# Patient Record
Sex: FEMALE | Race: WHITE | NOT HISPANIC OR LATINO | URBAN - METROPOLITAN AREA
[De-identification: names, ages, dates, MRNs, and addresses within clinical notes are randomized per-mention and may not be internally consistent; named-entity substitution may affect disease eponyms.]

---

## 2019-11-07 ENCOUNTER — INPATIENT (INPATIENT)
Facility: HOSPITAL | Age: 79
LOS: 4 days | Discharge: NURSING, REHAB OR LONG-TERM CARE | DRG: 247 | End: 2019-11-12
Attending: FAMILY MEDICINE | Admitting: FAMILY MEDICINE
Payer: MEDICARE

## 2019-11-07 VITALS
RESPIRATION RATE: 17 BRPM | OXYGEN SATURATION: 95 % | HEART RATE: 93 BPM | DIASTOLIC BLOOD PRESSURE: 94 MMHG | WEIGHT: 160.06 LBS | TEMPERATURE: 98 F | HEIGHT: 60 IN | SYSTOLIC BLOOD PRESSURE: 116 MMHG

## 2019-11-07 DIAGNOSIS — R11.2 NAUSEA WITH VOMITING, UNSPECIFIED: ICD-10-CM

## 2019-11-07 DIAGNOSIS — N19 UNSPECIFIED KIDNEY FAILURE: ICD-10-CM

## 2019-11-07 DIAGNOSIS — R31.9 HEMATURIA, UNSPECIFIED: ICD-10-CM

## 2019-11-07 DIAGNOSIS — R03.0 ELEVATED BLOOD-PRESSURE READING, WITHOUT DIAGNOSIS OF HYPERTENSION: ICD-10-CM

## 2019-11-07 DIAGNOSIS — R53.1 WEAKNESS: ICD-10-CM

## 2019-11-07 DIAGNOSIS — Z29.9 ENCOUNTER FOR PROPHYLACTIC MEASURES, UNSPECIFIED: ICD-10-CM

## 2019-11-07 DIAGNOSIS — M62.82 RHABDOMYOLYSIS: ICD-10-CM

## 2019-11-07 DIAGNOSIS — R01.1 CARDIAC MURMUR, UNSPECIFIED: ICD-10-CM

## 2019-11-07 DIAGNOSIS — N39.0 URINARY TRACT INFECTION, SITE NOT SPECIFIED: ICD-10-CM

## 2019-11-07 LAB
ALBUMIN SERPL ELPH-MCNC: 3.9 G/DL — SIGNIFICANT CHANGE UP (ref 3.3–5)
ALP SERPL-CCNC: 92 U/L — SIGNIFICANT CHANGE UP (ref 40–120)
ALT FLD-CCNC: 27 U/L — SIGNIFICANT CHANGE UP (ref 12–78)
ANION GAP SERPL CALC-SCNC: 11 MMOL/L — SIGNIFICANT CHANGE UP (ref 5–17)
APPEARANCE UR: ABNORMAL
APTT BLD: 27.1 SEC — LOW (ref 27.5–36.3)
AST SERPL-CCNC: 63 U/L — HIGH (ref 15–37)
BASOPHILS # BLD AUTO: 0.04 K/UL — SIGNIFICANT CHANGE UP (ref 0–0.2)
BASOPHILS NFR BLD AUTO: 0.3 % — SIGNIFICANT CHANGE UP (ref 0–2)
BILIRUB SERPL-MCNC: 0.7 MG/DL — SIGNIFICANT CHANGE UP (ref 0.2–1.2)
BILIRUB UR-MCNC: NEGATIVE — SIGNIFICANT CHANGE UP
BUN SERPL-MCNC: 32 MG/DL — HIGH (ref 7–23)
CALCIUM SERPL-MCNC: 9.7 MG/DL — SIGNIFICANT CHANGE UP (ref 8.5–10.1)
CHLORIDE SERPL-SCNC: 108 MMOL/L — SIGNIFICANT CHANGE UP (ref 96–108)
CK SERPL-CCNC: 1458 U/L — HIGH (ref 26–192)
CO2 SERPL-SCNC: 21 MMOL/L — LOW (ref 22–31)
COLOR SPEC: YELLOW — SIGNIFICANT CHANGE UP
CREAT SERPL-MCNC: 1.44 MG/DL — HIGH (ref 0.5–1.3)
DIFF PNL FLD: ABNORMAL
EOSINOPHIL # BLD AUTO: 0.01 K/UL — SIGNIFICANT CHANGE UP (ref 0–0.5)
EOSINOPHIL NFR BLD AUTO: 0.1 % — SIGNIFICANT CHANGE UP (ref 0–6)
GLUCOSE SERPL-MCNC: 101 MG/DL — HIGH (ref 70–99)
GLUCOSE UR QL: NEGATIVE MG/DL — SIGNIFICANT CHANGE UP
HCT VFR BLD CALC: 41.2 % — SIGNIFICANT CHANGE UP (ref 34.5–45)
HGB BLD-MCNC: 13.5 G/DL — SIGNIFICANT CHANGE UP (ref 11.5–15.5)
IMM GRANULOCYTES NFR BLD AUTO: 0.3 % — SIGNIFICANT CHANGE UP (ref 0–1.5)
INR BLD: 1.1 RATIO — SIGNIFICANT CHANGE UP (ref 0.88–1.16)
KETONES UR-MCNC: ABNORMAL
LEUKOCYTE ESTERASE UR-ACNC: NEGATIVE — SIGNIFICANT CHANGE UP
LYMPHOCYTES # BLD AUTO: 1.54 K/UL — SIGNIFICANT CHANGE UP (ref 1–3.3)
LYMPHOCYTES # BLD AUTO: 10 % — LOW (ref 13–44)
MCHC RBC-ENTMCNC: 29.7 PG — SIGNIFICANT CHANGE UP (ref 27–34)
MCHC RBC-ENTMCNC: 32.8 GM/DL — SIGNIFICANT CHANGE UP (ref 32–36)
MCV RBC AUTO: 90.7 FL — SIGNIFICANT CHANGE UP (ref 80–100)
MONOCYTES # BLD AUTO: 0.78 K/UL — SIGNIFICANT CHANGE UP (ref 0–0.9)
MONOCYTES NFR BLD AUTO: 5.1 % — SIGNIFICANT CHANGE UP (ref 2–14)
NEUTROPHILS # BLD AUTO: 12.93 K/UL — HIGH (ref 1.8–7.4)
NEUTROPHILS NFR BLD AUTO: 84.2 % — HIGH (ref 43–77)
NITRITE UR-MCNC: NEGATIVE — SIGNIFICANT CHANGE UP
PH UR: 5 — SIGNIFICANT CHANGE UP (ref 5–8)
PLATELET # BLD AUTO: 278 K/UL — SIGNIFICANT CHANGE UP (ref 150–400)
POTASSIUM SERPL-MCNC: 3.9 MMOL/L — SIGNIFICANT CHANGE UP (ref 3.5–5.3)
POTASSIUM SERPL-SCNC: 3.9 MMOL/L — SIGNIFICANT CHANGE UP (ref 3.5–5.3)
PROT SERPL-MCNC: 8.8 GM/DL — HIGH (ref 6–8.3)
PROT UR-MCNC: 100 MG/DL
PROTHROM AB SERPL-ACNC: 12.3 SEC — SIGNIFICANT CHANGE UP (ref 10–12.9)
RBC # BLD: 4.54 M/UL — SIGNIFICANT CHANGE UP (ref 3.8–5.2)
RBC # FLD: 13.1 % — SIGNIFICANT CHANGE UP (ref 10.3–14.5)
SODIUM SERPL-SCNC: 140 MMOL/L — SIGNIFICANT CHANGE UP (ref 135–145)
SP GR SPEC: 1.02 — SIGNIFICANT CHANGE UP (ref 1.01–1.02)
TROPONIN I SERPL-MCNC: 0.04 NG/ML — SIGNIFICANT CHANGE UP (ref 0.01–0.04)
UROBILINOGEN FLD QL: NEGATIVE MG/DL — SIGNIFICANT CHANGE UP
WBC # BLD: 15.35 K/UL — HIGH (ref 3.8–10.5)
WBC # FLD AUTO: 15.35 K/UL — HIGH (ref 3.8–10.5)

## 2019-11-07 PROCEDURE — 87040 BLOOD CULTURE FOR BACTERIA: CPT

## 2019-11-07 PROCEDURE — 85025 COMPLETE CBC W/AUTO DIFF WBC: CPT

## 2019-11-07 PROCEDURE — 82550 ASSAY OF CK (CPK): CPT

## 2019-11-07 PROCEDURE — 97163 PT EVAL HIGH COMPLEX 45 MIN: CPT | Mod: GP

## 2019-11-07 PROCEDURE — C1725: CPT

## 2019-11-07 PROCEDURE — C9113: CPT

## 2019-11-07 PROCEDURE — 83036 HEMOGLOBIN GLYCOSYLATED A1C: CPT

## 2019-11-07 PROCEDURE — 84484 ASSAY OF TROPONIN QUANT: CPT

## 2019-11-07 PROCEDURE — 83735 ASSAY OF MAGNESIUM: CPT

## 2019-11-07 PROCEDURE — 97530 THERAPEUTIC ACTIVITIES: CPT | Mod: GP

## 2019-11-07 PROCEDURE — 86850 RBC ANTIBODY SCREEN: CPT

## 2019-11-07 PROCEDURE — 71045 X-RAY EXAM CHEST 1 VIEW: CPT | Mod: 26

## 2019-11-07 PROCEDURE — C1874: CPT

## 2019-11-07 PROCEDURE — 74176 CT ABD & PELVIS W/O CONTRAST: CPT

## 2019-11-07 PROCEDURE — 36415 COLL VENOUS BLD VENIPUNCTURE: CPT

## 2019-11-07 PROCEDURE — 80053 COMPREHEN METABOLIC PANEL: CPT

## 2019-11-07 PROCEDURE — 86901 BLOOD TYPING SEROLOGIC RH(D): CPT

## 2019-11-07 PROCEDURE — G0008: CPT

## 2019-11-07 PROCEDURE — 93005 ELECTROCARDIOGRAM TRACING: CPT

## 2019-11-07 PROCEDURE — 90686 IIV4 VACC NO PRSV 0.5 ML IM: CPT

## 2019-11-07 PROCEDURE — 85027 COMPLETE CBC AUTOMATED: CPT

## 2019-11-07 PROCEDURE — 80048 BASIC METABOLIC PNL TOTAL CA: CPT

## 2019-11-07 PROCEDURE — C1894: CPT

## 2019-11-07 PROCEDURE — 86900 BLOOD TYPING SEROLOGIC ABO: CPT

## 2019-11-07 PROCEDURE — 97116 GAIT TRAINING THERAPY: CPT | Mod: GP

## 2019-11-07 PROCEDURE — C1887: CPT

## 2019-11-07 PROCEDURE — C1760: CPT

## 2019-11-07 PROCEDURE — C1769: CPT

## 2019-11-07 PROCEDURE — 93010 ELECTROCARDIOGRAM REPORT: CPT

## 2019-11-07 PROCEDURE — 80061 LIPID PANEL: CPT

## 2019-11-07 PROCEDURE — 93306 TTE W/DOPPLER COMPLETE: CPT

## 2019-11-07 RX ORDER — HYDRALAZINE HCL 50 MG
10 TABLET ORAL EVERY 4 HOURS
Refills: 0 | Status: DISCONTINUED | OUTPATIENT
Start: 2019-11-07 | End: 2019-11-12

## 2019-11-07 RX ORDER — ONDANSETRON 8 MG/1
4 TABLET, FILM COATED ORAL EVERY 6 HOURS
Refills: 0 | Status: DISCONTINUED | OUTPATIENT
Start: 2019-11-07 | End: 2019-11-12

## 2019-11-07 RX ORDER — SODIUM CHLORIDE 9 MG/ML
1000 INJECTION INTRAMUSCULAR; INTRAVENOUS; SUBCUTANEOUS
Refills: 0 | Status: DISCONTINUED | OUTPATIENT
Start: 2019-11-07 | End: 2019-11-10

## 2019-11-07 RX ORDER — CEFTRIAXONE 500 MG/1
1000 INJECTION, POWDER, FOR SOLUTION INTRAMUSCULAR; INTRAVENOUS ONCE
Refills: 0 | Status: COMPLETED | OUTPATIENT
Start: 2019-11-07 | End: 2019-11-07

## 2019-11-07 RX ORDER — SODIUM CHLORIDE 9 MG/ML
1000 INJECTION INTRAMUSCULAR; INTRAVENOUS; SUBCUTANEOUS ONCE
Refills: 0 | Status: COMPLETED | OUTPATIENT
Start: 2019-11-07 | End: 2019-11-07

## 2019-11-07 RX ORDER — CEFTRIAXONE 500 MG/1
1000 INJECTION, POWDER, FOR SOLUTION INTRAMUSCULAR; INTRAVENOUS ONCE
Refills: 0 | Status: DISCONTINUED | OUTPATIENT
Start: 2019-11-07 | End: 2019-11-07

## 2019-11-07 RX ORDER — PANTOPRAZOLE SODIUM 20 MG/1
40 TABLET, DELAYED RELEASE ORAL
Refills: 0 | Status: DISCONTINUED | OUTPATIENT
Start: 2019-11-07 | End: 2019-11-08

## 2019-11-07 RX ORDER — ACETAMINOPHEN 500 MG
650 TABLET ORAL EVERY 4 HOURS
Refills: 0 | Status: DISCONTINUED | OUTPATIENT
Start: 2019-11-07 | End: 2019-11-12

## 2019-11-07 RX ADMIN — SODIUM CHLORIDE 1000 MILLILITER(S): 9 INJECTION INTRAMUSCULAR; INTRAVENOUS; SUBCUTANEOUS at 16:11

## 2019-11-07 RX ADMIN — CEFTRIAXONE 1000 MILLIGRAM(S): 500 INJECTION, POWDER, FOR SOLUTION INTRAMUSCULAR; INTRAVENOUS at 20:00

## 2019-11-07 RX ADMIN — Medication 10 MILLIGRAM(S): at 22:37

## 2019-11-07 NOTE — H&P ADULT - PROBLEM SELECTOR PLAN 4
unknown baseline  will give IVF and check in AM  if worsens/no improvement- consider further work up

## 2019-11-07 NOTE — ED PROVIDER NOTE - CLINICAL SUMMARY MEDICAL DECISION MAKING FREE TEXT BOX
Pt with generalized weakness with fall on ground where she remained for 2 days, will check CK r/o rabdo, UA, CXR, basics, reevaluate.

## 2019-11-07 NOTE — ED ADULT NURSE NOTE - NSIMPLEMENTINTERV_GEN_ALL_ED
Implemented All Universal Safety Interventions:  [place] to call system. Call bell, personal items and telephone within reach. Instruct patient to call for assistance. Room bathroom lighting operational. Non-slip footwear when patient is off stretcher. Physically safe environment: no spills, clutter or unnecessary equipment. Stretcher in lowest position, wheels locked, appropriate side rails in place.

## 2019-11-07 NOTE — H&P ADULT - ASSESSMENT
79F w/no significant PMH (does not got to MD regularly), presents after found on the floor for 2 days as she was too weak to get up.  Pt denies Fall.   +decreased PO intake, +Nausea, + dry heaves    In ED, WBC 15, Cr1.44 (no baseline), CPK 1400, cxr neg, UA neg (+blood), received rocephin, IVF.

## 2019-11-07 NOTE — ED ADULT NURSE NOTE - OBJECTIVE STATEMENT
PT BIBA sp fall x 2 days ago.  PT states neighbor helped pt to the ground, pt denies head injury -LOC.  Pt states after, "I had to sit back down on the floor because I felt weak and my lower back hurts".  Pt states "I have not eaten or drank in 2 days".  Pt A/oX3, VSS.  Daughter at bedside requesting social work as pt lives home alone.  Safety maitnained

## 2019-11-07 NOTE — H&P ADULT - HISTORY OF PRESENT ILLNESS
HPI:  79F w/no significant PMH (pt does not got to PCP very often and not on any meds), presents after found on the floor for 2 days as she was too weak to get up.  Pt is AAOX3 (PPS) and provides her own history, dtr, Catina, at bedside and verifies.  Pt lives alone, endorses poor appetite and nausea w/ dry heaves over past week contributing to decreased PO intake.  She's become more weak over the past few days and 2 days ago, she felt too weak to go any further, so she sat down on the floor and couldn't get up d/t weakness.  She denies any fall.  Family called her, but she couldn't get to the phone.  Dtr called neighbor to check on her and pt states she couldn't get to the door.  Finally, pt BIBEMS to ED for eval.  Pt c/o lower back pain, states from being on the floor.  She denies any regular medical follow up.  She takes 400mg Alleve in the morning for "aches and pains".  She denies any recent illness, fever/chills, cough/cp/sob, abd pain, hematochezia/melena/hematemesis, dysuria, hematuria.  She endorses a few episodes of diarrhea, but no further events d/t no PO intake.  Currently, pt is pleasant and comfortable and has no c/o.    In ED, WBC 15, Cr1.44 (no baseline), CPK 1400, cxr neg, UA neg (+blood), received rocephin, IVF.      PAST MEDICAL & SURGICAL HISTORY:  Urinary incontinence      Review of Systems:   CONSTITUTIONAL: No fever.  EYES: No eye pain or discharge.  ENMT:  No sinus or throat pain  NECK: No pain or stiffness  RESPIRATORY: No cough, wheezing, chills or hemoptysis; No shortness of breath  CARDIOVASCULAR: No chest pain, palpitations, dizziness, or leg swelling  GASTROINTESTINAL: No abdominal or epigastric pain.  ++nausea, ++ dry heaves,   NO hematemesis; ++diarrhea,  No melena or hematochezia.  GENITOURINARY: No dysuria  ++incontinence  NEUROLOGICAL: No headaches, memory loss, loss of strength, numbness, or tremors  SKIN: No rashes.  MUSCULOSKELETAL: No joint pain or swelling; No muscle, back, or extremity pain  PSYCHIATRIC: No depression, anxiety, mood swings, or difficulty sleeping  HEME/LYMPH: No easy bruising, or bleeding gums    Allergies    No Known Allergies    Intolerances        Social History:   LIVES ALONE  USES CANE OCC   DENIES SMOKING/ETOH/DRUG     FAMILY HISTORY:  UNKNOWN TO THIS ELDERLY LADY     Home Medications:  NONE     MEDICATIONS  (STANDING):  pantoprazole  Injectable 40 milliGRAM(s) IV Push two times a day  sodium chloride 0.9%. 1000 milliLiter(s) (125 mL/Hr) IV Continuous <Continuous>    MEDICATIONS  (PRN):  acetaminophen    Suspension .. 650 milliGRAM(s) Oral every 4 hours PRN Mild Pain (1 - 3)  hydrALAZINE Injectable 10 milliGRAM(s) IV Push every 4 hours PRN sbp>160  ondansetron Injectable 4 milliGRAM(s) IV Push every 6 hours PRN Nausea        PHYSICAL EXAM:  Vital Signs Last 24 Hrs  T(C): 36.8 (2019 19:59), Max: 36.8 (2019 17:52)  T(F): 98.3 (2019 19:59), Max: 98.3 (2019 19:59)  HR: 100 (2019 19:59) (93 - 100)  BP: 187/82 (2019 19:59) (116/94 - 187/82)  BP(mean): --  RR: 18 (2019 19:59) (17 - 18)  SpO2: 100% (2019 19:59) (95% - 100%)  GENERAL: NAD, well-developed FOR AGE  HEAD:  Atraumatic, Normocephalic  EYES: EOMI, PERRLA, conjunctiva and sclera clear  ENT: normal hearing, no nasal discharge, throat clear, + Halitosis, normal dentition for age  NECK: Supple, No JVD, NO LAD, no thyromegaly   CHEST/LUNG: Clear to auscultation bilaterally; No wheeze, resp unlabored  HEART: Regular rate and rhythm; +ROXY III/VI  ABDOMEN: Soft, Nontender, Nondistended; Bowel sounds present, no HSM  EXTREMITIES:  2+ Peripheral Pulses, No clubbing, cyanosis, or edema  PSYCH: AAOx3, normal behavior  NEUROLOGY: non-focal, sensory and cn 2-12 intact  SKIN: No visible rashes or lesions    LABS:                        13.5   15.35 )-----------( 278      ( 2019 16:08 )             41.2         140  |  108  |  32<H>  ----------------------------<  101<H>  3.9   |  21<L>  |  1.44<H>    Ca    9.7      2019 16:08    TPro  8.8<H>  /  Alb  3.9  /  TBili  0.7  /  DBili  x   /  AST  63<H>  /  ALT  27  /  AlkPhos  92      PT/INR - ( 2019 16:08 )   PT: 12.3 sec;   INR: 1.10 ratio         PTT - ( 2019 16:08 )  PTT:27.1 sec  CARDIAC MARKERS ( 2019 16:08 )  x     / x     / 1458 U/L / x     / x          Urinalysis Basic - ( 2019 17:34 )    Color: Yellow / Appearance: Slightly Turbid / S.020 / pH: x  Gluc: x / Ketone: Small  / Bili: Negative / Urobili: Negative mg/dL   Blood: x / Protein: 100 mg/dL / Nitrite: Negative   Leuk Esterase: Negative / RBC: 11-25 /HPF / WBC 11-25   Sq Epi: x / Non Sq Epi: Moderate / Bacteria: Moderate        RADIOLOGY & ADDITIONAL TESTS:    Imaging Personally Reviewed:  CXR clear    EKG Personally Reviewed:  NSR

## 2019-11-07 NOTE — ED ADULT TRIAGE NOTE - CHIEF COMPLAINT QUOTE
Patient presents s/p fall. found by daughter after fall 2 days ago and was laying on ground so daughter called EMS. denies taking blood thinners. complaining of low back pain.

## 2019-11-07 NOTE — H&P ADULT - PROBLEM SELECTOR PLAN 3
likely contributing to decreased PO intake and weakness  prn antiemetics  IV PPI BID  GI cs  hold alleve/NSAIDs  CLD for now if tolerated - ADAT   IVF

## 2019-11-07 NOTE — ED PROVIDER NOTE - MUSCULOSKELETAL, MLM
Spine appears normal, range of motion is not limited, no muscle or joint tenderness +erythema over sacral spine, no spinal TTP

## 2019-11-07 NOTE — ED PROVIDER NOTE - OBJECTIVE STATEMENT
80 y/o f with PMHx of urinary incontinence presenting to the ED c/o lower back pain s/p fall x2. Pt fell 2 days ago and was unable to pick herself up or reach the phone so she stayed on the ground until her daughter found her there today, daughter called EMS. Pt lives by herself. Pt was unable to eat or drink while on the ground. Pt uses a cane to ambulate outside, ambulates independently at home, states she lost her balance and sat back when she fell, unable to stand due to weakness. Denies taking blood thinners. Denies fever, chills, any other acute c/o. NKDA.

## 2019-11-07 NOTE — H&P ADULT - PROBLEM SELECTOR PLAN 7
no known h/o HTN although pt does not f/u w/ MD very much  will start PRN IV hydralazine and consider further long-term meds if needed

## 2019-11-08 LAB
ANION GAP SERPL CALC-SCNC: 8 MMOL/L — SIGNIFICANT CHANGE UP (ref 5–17)
BUN SERPL-MCNC: 35 MG/DL — HIGH (ref 7–23)
CALCIUM SERPL-MCNC: 8.6 MG/DL — SIGNIFICANT CHANGE UP (ref 8.5–10.1)
CHLORIDE SERPL-SCNC: 114 MMOL/L — HIGH (ref 96–108)
CK SERPL-CCNC: 635 U/L — HIGH (ref 26–192)
CO2 SERPL-SCNC: 20 MMOL/L — LOW (ref 22–31)
CREAT SERPL-MCNC: 1.4 MG/DL — HIGH (ref 0.5–1.3)
GLUCOSE SERPL-MCNC: 90 MG/DL — SIGNIFICANT CHANGE UP (ref 70–99)
HBA1C BLD-MCNC: 5.2 % — SIGNIFICANT CHANGE UP (ref 4–5.6)
HCT VFR BLD CALC: 35.2 % — SIGNIFICANT CHANGE UP (ref 34.5–45)
HGB BLD-MCNC: 11.1 G/DL — LOW (ref 11.5–15.5)
MAGNESIUM SERPL-MCNC: 2.4 MG/DL — SIGNIFICANT CHANGE UP (ref 1.6–2.6)
MCHC RBC-ENTMCNC: 29.4 PG — SIGNIFICANT CHANGE UP (ref 27–34)
MCHC RBC-ENTMCNC: 31.5 GM/DL — LOW (ref 32–36)
MCV RBC AUTO: 93.4 FL — SIGNIFICANT CHANGE UP (ref 80–100)
PLATELET # BLD AUTO: 241 K/UL — SIGNIFICANT CHANGE UP (ref 150–400)
POTASSIUM SERPL-MCNC: 3.8 MMOL/L — SIGNIFICANT CHANGE UP (ref 3.5–5.3)
POTASSIUM SERPL-SCNC: 3.8 MMOL/L — SIGNIFICANT CHANGE UP (ref 3.5–5.3)
RBC # BLD: 3.77 M/UL — LOW (ref 3.8–5.2)
RBC # FLD: 13.2 % — SIGNIFICANT CHANGE UP (ref 10.3–14.5)
SODIUM SERPL-SCNC: 142 MMOL/L — SIGNIFICANT CHANGE UP (ref 135–145)
TROPONIN I SERPL-MCNC: 0.04 NG/ML — SIGNIFICANT CHANGE UP (ref 0.01–0.04)
WBC # BLD: 12.58 K/UL — HIGH (ref 3.8–10.5)
WBC # FLD AUTO: 12.58 K/UL — HIGH (ref 3.8–10.5)

## 2019-11-08 PROCEDURE — 93306 TTE W/DOPPLER COMPLETE: CPT | Mod: 26

## 2019-11-08 RX ORDER — PANTOPRAZOLE SODIUM 20 MG/1
40 TABLET, DELAYED RELEASE ORAL
Refills: 0 | Status: DISCONTINUED | OUTPATIENT
Start: 2019-11-08 | End: 2019-11-12

## 2019-11-08 RX ORDER — INFLUENZA VIRUS VACCINE 15; 15; 15; 15 UG/.5ML; UG/.5ML; UG/.5ML; UG/.5ML
0.5 SUSPENSION INTRAMUSCULAR ONCE
Refills: 0 | Status: COMPLETED | OUTPATIENT
Start: 2019-11-08 | End: 2019-11-12

## 2019-11-08 RX ADMIN — SODIUM CHLORIDE 83 MILLILITER(S): 9 INJECTION INTRAMUSCULAR; INTRAVENOUS; SUBCUTANEOUS at 18:53

## 2019-11-08 RX ADMIN — SODIUM CHLORIDE 125 MILLILITER(S): 9 INJECTION INTRAMUSCULAR; INTRAVENOUS; SUBCUTANEOUS at 09:13

## 2019-11-08 RX ADMIN — SODIUM CHLORIDE 83 MILLILITER(S): 9 INJECTION INTRAMUSCULAR; INTRAVENOUS; SUBCUTANEOUS at 17:54

## 2019-11-08 RX ADMIN — PANTOPRAZOLE SODIUM 40 MILLIGRAM(S): 20 TABLET, DELAYED RELEASE ORAL at 05:39

## 2019-11-08 RX ADMIN — PANTOPRAZOLE SODIUM 40 MILLIGRAM(S): 20 TABLET, DELAYED RELEASE ORAL at 17:54

## 2019-11-08 RX ADMIN — SODIUM CHLORIDE 125 MILLILITER(S): 9 INJECTION INTRAMUSCULAR; INTRAVENOUS; SUBCUTANEOUS at 01:10

## 2019-11-08 NOTE — PROGRESS NOTE ADULT - SUBJECTIVE AND OBJECTIVE BOX
79 F w/no significant PMH (pt does not got to PCP very often and not on any meds), presents after found on the floor for 2 days as she was too weak to get up.  Pt is AAOX3 (PPS) and provides her own history, dtr, Catina, at bedside and verifies.  Pt lives alone, endorses poor appetite and nausea w/ dry heaves over past week contributing to decreased PO intake.  She's become more weak over the past few days and 2 days ago, she felt too weak to go any further, so she sat down on the floor and couldn't get up d/t weakness.  She denies any fall.  Family called her, but she couldn't get to the phone.  Dtr called neighbor to check on her and pt states she couldn't get to the door.  Finally, pt BIBEMS to ED for eval.  Pt c/o lower back pain, states from being on the floor.  She denies any regular medical follow up.  She takes 400mg Alleve in the morning for "aches and pains".  She denies any recent illness, fever/chills, cough/cp/sob, abd pain, hematochezia/melena/hematemesis, dysuria, hematuria.  She endorses a few episodes of diarrhea, but no further events d/t no PO intake.  Currently, pt is pleasant and comfortable and has no c/o.  Patient doesn't recall how she fell on the floor; she states : "i could have passed out"    11.8: no distress, tolerated diet            REVIEW OF SYSTEMS:    CONSTITUTIONAL: No weakness, No fevers or chills  ENT: No ear ache, No sorethroat  NECK: No pain, No stiffness  RESPIRATORY: No cough, No wheezing, No hemoptysis; No dyspnea  CARDIOVASCULAR: No chest pain, No palpitations  GASTROINTESTINAL: No abd pain, No nausea, No vomiting, No hematemesis, No diarrhea or constipation. No melena, No hematochezia.  GENITOURINARY: No dysuria, No  hematuria  NEUROLOGICAL: No diplopia, No paresthesia, No motor dysfunction  MUSCULOSKELETAL: No arthralgia, No myalgia  SKIN: No rashes, or lesions   PSYCH: no anxiety, no suicidal ideation    All other review of systems is negative unless indicated above    Vital Signs Last 24 Hrs  T(C): 36.7 (08 Nov 2019 15:35), Max: 36.8 (07 Nov 2019 17:52)  T(F): 98.1 (08 Nov 2019 15:35), Max: 98.3 (07 Nov 2019 19:59)  HR: 86 (08 Nov 2019 15:35) (86 - 100)  BP: 153/59 (08 Nov 2019 15:35) (121/76 - 187/82)  BP(mean): --  RR: 18 (08 Nov 2019 15:35) (17 - 18)  SpO2: 99% (08 Nov 2019 15:35) (99% - 100%)    PHYSICAL EXAM:    GENERAL: NAD, Well nourished  HEENT:  NC/AT, EOMI, PERRLA, No scleral icterus, Moist mucous membranes  NECK: Supple, No JVD  CNS:  Alert & Oriented X3, Motor Strength 5/5 B/L upper and lower extremities; DTRs 2+ intact   LUNG: Normal Breath sounds, Clear to auscultation bilaterally, No rales, No rhonchi, No wheezing  HEART: RRR; +large systolic murmurs, No rubs  ABDOMEN: +BS, ST/ND/NT  GENITOURINARY: Voiding, Bladder not distended  EXTREMITIES:  2+ Peripheral Pulses, No clubbing, No cyanosis, No tibial edema  MUSCULOSKELTAL: Joints normal ROM, No TTP, No effusion  VAGINAL: deferred  SKIN: no rashes  RECTAL: deferred, not indicated  BREAST: deferred                          11.1   12.58 )-----------( 241      ( 08 Nov 2019 08:42 )             35.2     11-08    142  |  114<H>  |  35<H>  ----------------------------<  90  3.8   |  20<L>  |  1.40<H>    Ca    8.6      08 Nov 2019 08:42  Mg     2.4     11-08    TPro  8.8<H>  /  Alb  3.9  /  TBili  0.7  /  DBili  x   /  AST  63<H>  /  ALT  27  /  AlkPhos  92  11-07    Vancomycin levels:   Cultures:     MEDICATIONS  (STANDING):  influenza   Vaccine 0.5 milliLiter(s) IntraMuscular once  pantoprazole  Injectable 40 milliGRAM(s) IV Push two times a day  sodium chloride 0.9%. 1000 milliLiter(s) (125 mL/Hr) IV Continuous <Continuous>    MEDICATIONS  (PRN):  acetaminophen    Suspension .. 650 milliGRAM(s) Oral every 4 hours PRN Mild Pain (1 - 3)  hydrALAZINE Injectable 10 milliGRAM(s) IV Push every 4 hours PRN sbp>160  ondansetron Injectable 4 milliGRAM(s) IV Push every 6 hours PRN Nausea      all labs reviewed  all imaging reviewed    a/p:    1. Syncope:  transfer to telemetry  Cardiology evaluation  Echocardiogram: evaluate for AS    2. Prerenal azotemia:  r/o dehydration  IV fluids, recheck Cr in Am    3. Rhabdomyolysis   IV fluids  f/u Cpk

## 2019-11-08 NOTE — PHYSICAL THERAPY INITIAL EVALUATION ADULT - MODALITIES TREATMENT COMMENTS
pt left seated in chair post Eval; chair alarm donned; callbell in reach; pt instructed not to get up alone; call nursing for assist; jason well; denied pain; RN Odalis made aware pt OOB

## 2019-11-09 LAB
ANION GAP SERPL CALC-SCNC: 8 MMOL/L — SIGNIFICANT CHANGE UP (ref 5–17)
BUN SERPL-MCNC: 32 MG/DL — HIGH (ref 7–23)
CALCIUM SERPL-MCNC: 8.6 MG/DL — SIGNIFICANT CHANGE UP (ref 8.5–10.1)
CHLORIDE SERPL-SCNC: 115 MMOL/L — HIGH (ref 96–108)
CO2 SERPL-SCNC: 20 MMOL/L — LOW (ref 22–31)
CREAT SERPL-MCNC: 1.27 MG/DL — SIGNIFICANT CHANGE UP (ref 0.5–1.3)
CULTURE RESULTS: SIGNIFICANT CHANGE UP
GLUCOSE SERPL-MCNC: 89 MG/DL — SIGNIFICANT CHANGE UP (ref 70–99)
HCT VFR BLD CALC: 35.1 % — SIGNIFICANT CHANGE UP (ref 34.5–45)
HGB BLD-MCNC: 11.1 G/DL — LOW (ref 11.5–15.5)
MCHC RBC-ENTMCNC: 29.8 PG — SIGNIFICANT CHANGE UP (ref 27–34)
MCHC RBC-ENTMCNC: 31.6 GM/DL — LOW (ref 32–36)
MCV RBC AUTO: 94.1 FL — SIGNIFICANT CHANGE UP (ref 80–100)
PLATELET # BLD AUTO: 215 K/UL — SIGNIFICANT CHANGE UP (ref 150–400)
POTASSIUM SERPL-MCNC: 3.4 MMOL/L — LOW (ref 3.5–5.3)
POTASSIUM SERPL-SCNC: 3.4 MMOL/L — LOW (ref 3.5–5.3)
RBC # BLD: 3.73 M/UL — LOW (ref 3.8–5.2)
RBC # FLD: 13.3 % — SIGNIFICANT CHANGE UP (ref 10.3–14.5)
SODIUM SERPL-SCNC: 143 MMOL/L — SIGNIFICANT CHANGE UP (ref 135–145)
SPECIMEN SOURCE: SIGNIFICANT CHANGE UP
WBC # BLD: 9.45 K/UL — SIGNIFICANT CHANGE UP (ref 3.8–10.5)
WBC # FLD AUTO: 9.45 K/UL — SIGNIFICANT CHANGE UP (ref 3.8–10.5)

## 2019-11-09 PROCEDURE — 74176 CT ABD & PELVIS W/O CONTRAST: CPT | Mod: 26

## 2019-11-09 RX ADMIN — PANTOPRAZOLE SODIUM 40 MILLIGRAM(S): 20 TABLET, DELAYED RELEASE ORAL at 06:08

## 2019-11-09 RX ADMIN — SODIUM CHLORIDE 83 MILLILITER(S): 9 INJECTION INTRAMUSCULAR; INTRAVENOUS; SUBCUTANEOUS at 18:41

## 2019-11-09 NOTE — DIETITIAN INITIAL EVALUATION ADULT. - ADD RECOMMEND
1) add ensure enlive BID to optimize PO intake 2) add MVI with minerals daily and consider checking vitamin D level 3) daily wt checks 4) monitor PO intake with strict record in EMR

## 2019-11-09 NOTE — DIETITIAN INITIAL EVALUATION ADULT. - PHYSICAL APPEARANCE
well nourished/other (specify)/obese NFPE did not reveal any significant muscle/fat wasting.  no edema.  anish score of 18, no PU noted.

## 2019-11-09 NOTE — DIETITIAN INITIAL EVALUATION ADULT. - NAME AND PHONE
Maria Isabel Nelson MA, RDN, CDN, [facility]  [phone] (office number)  [phone] (pager number)

## 2019-11-09 NOTE — DIETITIAN INITIAL EVALUATION ADULT. - OTHER INFO
78yo female with no significant PMH as pt doesn't go to PCP often and was not on any medications PTA.  Pt presented s/p being found on floor for 2 days because she was too weak to get up.  Pt endorsed to MD that she was having poor PO intake with nausea and dry heaves for 1 week prior to being found on floor.  Pt doesn't remember how she ended up on the floor.  Pt recently transferred to telemetry 2/2 possible syncope.  Pt with prerenal azotemia and rhabdomyolysis.  N/V has resolved; seen be GI pending further investigation. Per MD note, pt's daughter confirmed the decreased PO intake with Nausea and dry heaves to MD on admission.  Based on this, pt likely meeting <75% of estimated nutr needs x 1 week PTA. Pt observed with breakfast tray; stated she has been eating half of her meals since admission.  based on report, pt is meeting <75% of ENN since admission.  BM (+) 11/8.

## 2019-11-09 NOTE — DIETITIAN INITIAL EVALUATION ADULT. - PERTINENT MEDS FT
MEDICATIONS  (STANDING):  influenza   Vaccine 0.5 milliLiter(s) IntraMuscular once  pantoprazole    Tablet 40 milliGRAM(s) Oral before breakfast  sodium chloride 0.9%. 1000 milliLiter(s) (83 mL/Hr) IV Continuous <Continuous>    MEDICATIONS  (PRN):  acetaminophen    Suspension .. 650 milliGRAM(s) Oral every 4 hours PRN Mild Pain (1 - 3)  hydrALAZINE Injectable 10 milliGRAM(s) IV Push every 4 hours PRN sbp>160  ondansetron Injectable 4 milliGRAM(s) IV Push every 6 hours PRN Nausea

## 2019-11-09 NOTE — DIETITIAN INITIAL EVALUATION ADULT. - PERTINENT LABORATORY DATA
Normal for race
11-09 Na143 mmol/L Glu 89 mg/dL K+ 3.4 mmol/L<L> Cr  1.27 mg/dL BUN 32 mg/dL<H> Phos n/a   Alb n/a   PAB n/a

## 2019-11-09 NOTE — DIETITIAN INITIAL EVALUATION ADULT. - FACTORS AFF FOOD INTAKE
pt provides conflicting information.  told MD that she had bad N/V that caused patient not to eat which led to falling down and not being able to get up or eat x 2 days.  Pt told RD, no N/V issues recently and that shes been eating.  diet recall reveals good intake, however, when questioned more on it, pt stated she was not really eating.  Unable to quantify adequacy of PO intake 2/2 inconsistent information being provided.  pt did say her clothes are looser, however, unsure if she lost weight as she hasn't weighed her self in years./other (specify)

## 2019-11-09 NOTE — PROGRESS NOTE ADULT - SUBJECTIVE AND OBJECTIVE BOX
79 F w/no significant PMH (pt does not got to PCP very often and not on any meds), presents after found on the floor for 2 days as she was too weak to get up.  Pt is AAOX3 (PPS) and provides her own history, dtrCatina, at bedside and verifies.  Pt lives alone, endorses poor appetite and nausea w/ dry heaves over past week contributing to decreased PO intake.  She's become more weak over the past few days and 2 days ago, she felt too weak to go any further, so she sat down on the floor and couldn't get up d/t weakness.  She denies any fall.  Family called her, but she couldn't get to the phone.  Dtr called neighbor to check on her and pt states she couldn't get to the door.  Finally, pt BIBEMS to ED for eval.  Pt c/o lower back pain, states from being on the floor.  She denies any regular medical follow up.  She takes 400mg Alleve in the morning for "aches and pains".  She denies any recent illness, fever/chills, cough/cp/sob, abd pain, hematochezia/melena/hematemesis, dysuria, hematuria.  She endorses a few episodes of diarrhea, but no further events d/t no PO intake.  Currently, pt is pleasant and comfortable and has no c/o.  Patient doesn't recall how she fell on the floor; she states : "i could have passed out"    11.8: no distress, tolerated diet  11.9: no cp, no sob, no more n/v            REVIEW OF SYSTEMS:    CONSTITUTIONAL: No weakness, No fevers or chills  ENT: No ear ache, No sorethroat  NECK: No pain, No stiffness  RESPIRATORY: No cough, No wheezing, No hemoptysis; No dyspnea  CARDIOVASCULAR: No chest pain, No palpitations  GASTROINTESTINAL: No abd pain, No nausea, No vomiting, No hematemesis, No diarrhea or constipation. No melena, No hematochezia.  GENITOURINARY: No dysuria, No  hematuria  NEUROLOGICAL: No diplopia, No paresthesia, No motor dysfunction  MUSCULOSKELETAL: No arthralgia, No myalgia  SKIN: No rashes, or lesions   PSYCH: no anxiety, no suicidal ideation    All other review of systems is negative unless indicated above    Vital Signs Last 24 Hrs  T(C): 36.7 (08 Nov 2019 15:35), Max: 36.8 (07 Nov 2019 17:52)  T(F): 98.1 (08 Nov 2019 15:35), Max: 98.3 (07 Nov 2019 19:59)  HR: 86 (08 Nov 2019 15:35) (86 - 100)  BP: 153/59 (08 Nov 2019 15:35) (121/76 - 187/82)  BP(mean): --  RR: 18 (08 Nov 2019 15:35) (17 - 18)  SpO2: 99% (08 Nov 2019 15:35) (99% - 100%)    PHYSICAL EXAM:    GENERAL: NAD, Well nourished  HEENT:  NC/AT, EOMI, PERRLA, No scleral icterus, Moist mucous membranes  NECK: Supple, No JVD  CNS:  Alert & Oriented X3, Motor Strength 5/5 B/L upper and lower extremities; DTRs 2+ intact   LUNG: Normal Breath sounds, Clear to auscultation bilaterally, No rales, No rhonchi, No wheezing  HEART: RRR; +large systolic murmurs, No rubs  ABDOMEN: +BS, ST/ND/NT  GENITOURINARY: Voiding, Bladder not distended  EXTREMITIES:  2+ Peripheral Pulses, No clubbing, No cyanosis, No tibial edema  MUSCULOSKELTAL: Joints normal ROM, No TTP, No effusion  VAGINAL: deferred  SKIN: no rashes  RECTAL: deferred, not indicated  BREAST: deferred                          11.1   12.58 )-----------( 241      ( 08 Nov 2019 08:42 )             35.2     11-08    142  |  114<H>  |  35<H>  ----------------------------<  90  3.8   |  20<L>  |  1.40<H>    Ca    8.6      08 Nov 2019 08:42  Mg     2.4     11-08    TPro  8.8<H>  /  Alb  3.9  /  TBili  0.7  /  DBili  x   /  AST  63<H>  /  ALT  27  /  AlkPhos  92  11-07    Vancomycin levels:   Cultures:     MEDICATIONS  (STANDING):  influenza   Vaccine 0.5 milliLiter(s) IntraMuscular once  pantoprazole  Injectable 40 milliGRAM(s) IV Push two times a day  sodium chloride 0.9%. 1000 milliLiter(s) (125 mL/Hr) IV Continuous <Continuous>    MEDICATIONS  (PRN):  acetaminophen    Suspension .. 650 milliGRAM(s) Oral every 4 hours PRN Mild Pain (1 - 3)  hydrALAZINE Injectable 10 milliGRAM(s) IV Push every 4 hours PRN sbp>160  ondansetron Injectable 4 milliGRAM(s) IV Push every 6 hours PRN Nausea      all labs reviewed  all imaging reviewed    a/p:    1. Syncope:  possible vaso vagal event due to nausea/vomiting  GI eval noted: for CT abdomen    -Echo: severe AS  cardiology eval    -PSVT: self limited  will c/w monitoring    2. Prerenal azotemia:  resolving    3. Rhabdomyolysis

## 2019-11-10 RX ORDER — LANOLIN ALCOHOL/MO/W.PET/CERES
5 CREAM (GRAM) TOPICAL AT BEDTIME
Refills: 0 | Status: DISCONTINUED | OUTPATIENT
Start: 2019-11-10 | End: 2019-11-12

## 2019-11-10 RX ADMIN — SODIUM CHLORIDE 83 MILLILITER(S): 9 INJECTION INTRAMUSCULAR; INTRAVENOUS; SUBCUTANEOUS at 05:50

## 2019-11-10 RX ADMIN — Medication 5 MILLIGRAM(S): at 21:48

## 2019-11-10 RX ADMIN — PANTOPRAZOLE SODIUM 40 MILLIGRAM(S): 20 TABLET, DELAYED RELEASE ORAL at 05:50

## 2019-11-10 NOTE — CONSULT NOTE ADULT - ASSESSMENT
Imp:  N/V has resolved but the etiology of the presentation remains somewhat unclear    Rec:  CT abdomen and pelvis with PO contrast  Will defer IV contrast given borderline Cr clearance
79F w/no significant PMH (pt does not got to PCP very often and not on any meds), presents after found on the floor for 2 days as she was too weak to get up.  Pt is AAOX3 (PPS) and provides her own history, dtr, Catina, at bedside and verifies.  Pt lives alone, endorses poor appetite and nausea w/ dry heaves over past week contributing to decreased PO intake.  She's become more weak over the past few days and 2 days ago, she felt too weak to go any further, so she sat down on the floor and couldn't get up d/t weakness.  She denies any fall.  Family called her, but she couldn't get to the phone.  Dtr called neighbor to check on her and pt states she couldn't get to the door.  Finally, pt BIBEMS to ED for eval.  Pt c/o lower back pain, states from being on the floor.  She denies any regular medical follow up.  She takes 400mg Alleve in the morning for "aches and pains".  She denies any recent illness, fever/chills, cough/cp/sob, abd pain, hematochezia/melena/hematemesis, dysuria, hematuria.  She endorses a few episodes of diarrhea, but no further events d/t no PO intake.  Currently, pt is pleasant and comfortable and has no c/o.    In ED, WBC 15, Cr1.44 (no baseline), CPK 1400, cxr neg, UA neg (+blood), received rocephin, IVF.    11/10- found to have at least mod to severe AS by echo   syncope related to AS ? tele shows brief bursts of SVT   1) right and left cath todetermine the severiyt of AS   2) if AS not severe the she may need a LINQ

## 2019-11-10 NOTE — PROGRESS NOTE ADULT - SUBJECTIVE AND OBJECTIVE BOX
79 F w/no significant PMH (pt does not got to PCP very often and not on any meds), presents after found on the floor for 2 days as she was too weak to get up.  Pt is AAOX3 (PPS) and provides her own history, dtrCatina, at bedside and verifies.  Pt lives alone, endorses poor appetite and nausea w/ dry heaves over past week contributing to decreased PO intake.  She's become more weak over the past few days and 2 days ago, she felt too weak to go any further, so she sat down on the floor and couldn't get up d/t weakness.  She denies any fall.  Family called her, but she couldn't get to the phone.  Dtr called neighbor to check on her and pt states she couldn't get to the door.  Finally, pt BIBEMS to ED for eval.  Pt c/o lower back pain, states from being on the floor.  She denies any regular medical follow up.  She takes 400mg Alleve in the morning for "aches and pains".  She denies any recent illness, fever/chills, cough/cp/sob, abd pain, hematochezia/melena/hematemesis, dysuria, hematuria.  She endorses a few episodes of diarrhea, but no further events d/t no PO intake.  Currently, pt is pleasant and comfortable and has no c/o.  Patient doesn't recall how she fell on the floor; she states : "i could have passed out"    11.8: no distress, tolerated diet  11.10: no distress, tele: PSVT            REVIEW OF SYSTEMS:    CONSTITUTIONAL: No weakness, No fevers or chills  ENT: No ear ache, No sorethroat  NECK: No pain, No stiffness  RESPIRATORY: No cough, No wheezing, No hemoptysis; No dyspnea  CARDIOVASCULAR: No chest pain, No palpitations  GASTROINTESTINAL: No abd pain, No nausea, No vomiting, No hematemesis, No diarrhea or constipation. No melena, No hematochezia.  GENITOURINARY: No dysuria, No  hematuria  NEUROLOGICAL: No diplopia, No paresthesia, No motor dysfunction  MUSCULOSKELETAL: No arthralgia, No myalgia  SKIN: No rashes, or lesions   PSYCH: no anxiety, no suicidal ideation    All other review of systems is negative unless indicated above    Vital Signs Last 24 Hrs  T(C): 36.7 (08 Nov 2019 15:35), Max: 36.8 (07 Nov 2019 17:52)  T(F): 98.1 (08 Nov 2019 15:35), Max: 98.3 (07 Nov 2019 19:59)  HR: 86 (08 Nov 2019 15:35) (86 - 100)  BP: 153/59 (08 Nov 2019 15:35) (121/76 - 187/82)  BP(mean): --  RR: 18 (08 Nov 2019 15:35) (17 - 18)  SpO2: 99% (08 Nov 2019 15:35) (99% - 100%)    PHYSICAL EXAM:    GENERAL: NAD, Well nourished  HEENT:  NC/AT, EOMI, PERRLA, No scleral icterus, Moist mucous membranes  NECK: Supple, No JVD  CNS:  Alert & Oriented X3, Motor Strength 5/5 B/L upper and lower extremities; DTRs 2+ intact   LUNG: Normal Breath sounds, Clear to auscultation bilaterally, No rales, No rhonchi, No wheezing  HEART: RRR; +large systolic murmurs, No rubs  ABDOMEN: +BS, ST/ND/NT  GENITOURINARY: Voiding, Bladder not distended  EXTREMITIES:  2+ Peripheral Pulses, No clubbing, No cyanosis, No tibial edema  MUSCULOSKELTAL: Joints normal ROM, No TTP, No effusion  VAGINAL: deferred  SKIN: no rashes  RECTAL: deferred, not indicated  BREAST: deferred                          11.1   12.58 )-----------( 241      ( 08 Nov 2019 08:42 )             35.2     11-08    142  |  114<H>  |  35<H>  ----------------------------<  90  3.8   |  20<L>  |  1.40<H>    Ca    8.6      08 Nov 2019 08:42  Mg     2.4     11-08    TPro  8.8<H>  /  Alb  3.9  /  TBili  0.7  /  DBili  x   /  AST  63<H>  /  ALT  27  /  AlkPhos  92  11-07    Vancomycin levels:   Cultures:     MEDICATIONS  (STANDING):  influenza   Vaccine 0.5 milliLiter(s) IntraMuscular once  pantoprazole  Injectable 40 milliGRAM(s) IV Push two times a day  sodium chloride 0.9%. 1000 milliLiter(s) (125 mL/Hr) IV Continuous <Continuous>    MEDICATIONS  (PRN):  acetaminophen    Suspension .. 650 milliGRAM(s) Oral every 4 hours PRN Mild Pain (1 - 3)  hydrALAZINE Injectable 10 milliGRAM(s) IV Push every 4 hours PRN sbp>160  ondansetron Injectable 4 milliGRAM(s) IV Push every 6 hours PRN Nausea      all labs reviewed  all imaging reviewed    a/p:    1. Syncope:  Echo: severe AS  Tele: PSVT  Cardio eval noted: for cardiac cath    2. Prerenal azotemia:  resolved  d/c IVFluids    3. Rhabdomyolysis   resolved    4. Duodenitis:  c/w PPI    5. Endometrial thickening   outpt f/u with GYN

## 2019-11-10 NOTE — PROGRESS NOTE ADULT - SUBJECTIVE AND OBJECTIVE BOX
Patient is a 79y old  Female who presents with a chief complaint of Generalized weakness (08 Nov 2019 17:40)      Subective:  No complaints  Feels good.    PAST MEDICAL & SURGICAL HISTORY:  Urinary incontinence      MEDICATIONS  (STANDING):  influenza   Vaccine 0.5 milliLiter(s) IntraMuscular once  pantoprazole    Tablet 40 milliGRAM(s) Oral before breakfast  sodium chloride 0.9%. 1000 milliLiter(s) (83 mL/Hr) IV Continuous <Continuous>    MEDICATIONS  (PRN):  acetaminophen    Suspension .. 650 milliGRAM(s) Oral every 4 hours PRN Mild Pain (1 - 3)  hydrALAZINE Injectable 10 milliGRAM(s) IV Push every 4 hours PRN sbp>160  ondansetron Injectable 4 milliGRAM(s) IV Push every 6 hours PRN Nausea      REVIEW OF SYSTEMS:    RESPIRATORY: No shortness of breath  CARDIOVASCULAR: No chest pain  All other review of systems is negative unless indicated above.    Vital Signs Last 24 Hrs  T(C): 36.7 (10 Nov 2019 05:53), Max: 36.9 (09 Nov 2019 11:24)  T(F): 98.1 (10 Nov 2019 05:53), Max: 98.4 (09 Nov 2019 11:24)  HR: 75 (10 Nov 2019 05:53) (75 - 82)  BP: 150/67 (10 Nov 2019 05:53) (150/55 - 150/67)  BP(mean): --  RR: 19 (09 Nov 2019 21:00) (19 - 20)  SpO2: 98% (10 Nov 2019 05:53) (98% - 100%)    PHYSICAL EXAM:    Constitutional: NAD, well-developed  Respiratory: CTAB  Cardiovascular: S1 and S2, RRR  Gastrointestinal: BS+, soft, NT/ND  Extremities: No peripheral edema  Psychiatric: Normal mood, normal affect    LABS:                        11.1   9.45  )-----------( 215      ( 09 Nov 2019 08:42 )             35.1     11-09    143  |  115<H>  |  32<H>  ----------------------------<  89  3.4<L>   |  20<L>  |  1.27    Ca    8.6      09 Nov 2019 08:42            RADIOLOGY & ADDITIONAL STUDIES:

## 2019-11-10 NOTE — CONSULT NOTE ADULT - SUBJECTIVE AND OBJECTIVE BOX
HPI:  HPI:  79F w/no significant PMH (pt does not got to PCP very often and not on any meds), presents after found on the floor for 2 days as she was too weak to get up.  Pt is AAOX3 (PPS) and provides her own history, dtr, Catina, at bedside and verifies.  Pt lives alone, endorses poor appetite and nausea w/ dry heaves over past week contributing to decreased PO intake.  She's become more weak over the past few days and 2 days ago, she felt too weak to go any further, so she sat down on the floor and couldn't get up d/t weakness.  She denies any fall.  Family called her, but she couldn't get to the phone.  Dtr called neighbor to check on her and pt states she couldn't get to the door.  Finally, pt BIBEMS to ED for eval.  Pt c/o lower back pain, states from being on the floor.  She denies any regular medical follow up.  She takes 400mg Alleve in the morning for "aches and pains".  She denies any recent illness, fever/chills, cough/cp/sob, abd pain, hematochezia/melena/hematemesis, dysuria, hematuria.  She endorses a few episodes of diarrhea, but no further events d/t no PO intake.  Currently, pt is pleasant and comfortable and has no c/o.    In ED, WBC 15, Cr1.44 (no baseline), CPK 1400, cxr neg, UA neg (+blood), received rocephin, IVF.    ------------------  Patient now feels good. No N/V. No pain  PAST MEDICAL & SURGICAL HISTORY:  Urinary incontinence      Home Medications:      MEDICATIONS  (STANDING):  influenza   Vaccine 0.5 milliLiter(s) IntraMuscular once  pantoprazole    Tablet 40 milliGRAM(s) Oral before breakfast  sodium chloride 0.9%. 1000 milliLiter(s) (83 mL/Hr) IV Continuous <Continuous>    MEDICATIONS  (PRN):  acetaminophen    Suspension .. 650 milliGRAM(s) Oral every 4 hours PRN Mild Pain (1 - 3)  hydrALAZINE Injectable 10 milliGRAM(s) IV Push every 4 hours PRN sbp>160  ondansetron Injectable 4 milliGRAM(s) IV Push every 6 hours PRN Nausea      Allergies    No Known Allergies    Intolerances        SOCIAL HISTORY:    FAMILY HISTORY:      ROS  As above  Otherwise unremarkable    Vital Signs Last 24 Hrs  T(C): 36.7 (08 Nov 2019 15:35), Max: 36.8 (07 Nov 2019 17:52)  T(F): 98.1 (08 Nov 2019 15:35), Max: 98.3 (07 Nov 2019 19:59)  HR: 86 (08 Nov 2019 15:35) (86 - 100)  BP: 153/59 (08 Nov 2019 15:35) (121/76 - 187/82)  BP(mean): --  RR: 18 (08 Nov 2019 15:35) (17 - 18)  SpO2: 99% (08 Nov 2019 15:35) (99% - 100%)    Constitutional: NAD, well-developed  Respiratory: CTAB  Cardiovascular: S1 and S2, RRR  Gastrointestinal: BS+, soft, NT/ND  Extremities: No peripheral edema  Psychiatric: Normal mood, normal affect  Skin: No rashes    LABS:                        11.1   12.58 )-----------( 241      ( 08 Nov 2019 08:42 )             35.2     11-08    142  |  114<H>  |  35<H>  ----------------------------<  90  3.8   |  20<L>  |  1.40<H>    Ca    8.6      08 Nov 2019 08:42  Mg     2.4     11-08    TPro  8.8<H>  /  Alb  3.9  /  TBili  0.7  /  DBili  x   /  AST  63<H>  /  ALT  27  /  AlkPhos  92  11-07    PT/INR - ( 07 Nov 2019 16:08 )   PT: 12.3 sec;   INR: 1.10 ratio         PTT - ( 07 Nov 2019 16:08 )  PTT:27.1 sec  LIVER FUNCTIONS - ( 07 Nov 2019 16:08 )  Alb: 3.9 g/dL / Pro: 8.8 gm/dL / ALK PHOS: 92 U/L / ALT: 27 U/L / AST: 63 U/L / GGT: x             RADIOLOGY & ADDITIONAL STUDIES:
Patient is a 79y old  Female who presents with a chief complaint of Generalized weakness (2019 17:40)      HPI:  HPI:  79F w/no significant PMH (pt does not got to PCP very often and not on any meds), presents after found on the floor for 2 days as she was too weak to get up.  Pt is AAOX3 (PPS) and provides her own history, dtr, Catina, at bedside and verifies.  Pt lives alone, endorses poor appetite and nausea w/ dry heaves over past week contributing to decreased PO intake.  She's become more weak over the past few days and 2 days ago, she felt too weak to go any further, so she sat down on the floor and couldn't get up d/t weakness.  She denies any fall.  Family called her, but she couldn't get to the phone.  Dtr called neighbor to check on her and pt states she couldn't get to the door.  Finally, pt BIBEMS to ED for eval.  Pt c/o lower back pain, states from being on the floor.  She denies any regular medical follow up.  She takes 400mg Alleve in the morning for "aches and pains".  She denies any recent illness, fever/chills, cough/cp/sob, abd pain, hematochezia/melena/hematemesis, dysuria, hematuria.  She endorses a few episodes of diarrhea, but no further events d/t no PO intake.  Currently, pt is pleasant and comfortable and has no c/o.    In ED, WBC 15, Cr1.44 (no baseline), CPK 1400, cxr neg, UA neg (+blood), received rocephin, IVF.    11/10- found to have at least mod to severe AS by echo     PAST MEDICAL & SURGICAL HISTORY:  Urinary incontinence      Review of Systems:   CONSTITUTIONAL: No fever.  EYES: No eye pain or discharge.  ENMT:  No sinus or throat pain  NECK: No pain or stiffness  RESPIRATORY: No cough, wheezing, chills or hemoptysis; No shortness of breath  CARDIOVASCULAR: No chest pain, palpitations, dizziness, or leg swelling  GASTROINTESTINAL: No abdominal or epigastric pain.  ++nausea, ++ dry heaves,   NO hematemesis; ++diarrhea,  No melena or hematochezia.  GENITOURINARY: No dysuria  ++incontinence  NEUROLOGICAL: No headaches, memory loss, loss of strength, numbness, or tremors  SKIN: No rashes.  MUSCULOSKELETAL: No joint pain or swelling; No muscle, back, or extremity pain  PSYCHIATRIC: No depression, anxiety, mood swings, or difficulty sleeping  HEME/LYMPH: No easy bruising, or bleeding gums    Allergies    No Known Allergies    Intolerances        Social History:   LIVES ALONE  USES CANE OCC   DENIES SMOKING/ETOH/DRUG     FAMILY HISTORY:  UNKNOWN TO THIS ELDERLY LADY     Home Medications:  NONE     MEDICATIONS  (STANDING):  pantoprazole  Injectable 40 milliGRAM(s) IV Push two times a day  sodium chloride 0.9%. 1000 milliLiter(s) (125 mL/Hr) IV Continuous <Continuous>    MEDICATIONS  (PRN):  acetaminophen    Suspension .. 650 milliGRAM(s) Oral every 4 hours PRN Mild Pain (1 - 3)  hydrALAZINE Injectable 10 milliGRAM(s) IV Push every 4 hours PRN sbp>160  ondansetron Injectable 4 milliGRAM(s) IV Push every 6 hours PRN Nausea        PHYSICAL EXAM:  Vital Signs Last 24 Hrs  T(C): 36.8 (2019 19:59), Max: 36.8 (2019 17:52)  T(F): 98.3 (2019 19:59), Max: 98.3 (2019 19:59)  HR: 100 (2019 19:59) (93 - 100)  BP: 187/82 (2019 19:59) (116/94 - 187/82)  BP(mean): --  RR: 18 (2019 19:59) (17 - 18)  SpO2: 100% (2019 19:59) (95% - 100%)  GENERAL: NAD, well-developed FOR AGE  HEAD:  Atraumatic, Normocephalic  EYES: EOMI, PERRLA, conjunctiva and sclera clear  ENT: normal hearing, no nasal discharge, throat clear, + Halitosis, normal dentition for age  NECK: Supple, No JVD, NO LAD, no thyromegaly   CHEST/LUNG: Clear to auscultation bilaterally; No wheeze, resp unlabored  HEART: Regular rate and rhythm; +ROXY III/VI  ABDOMEN: Soft, Nontender, Nondistended; Bowel sounds present, no HSM  EXTREMITIES:  2+ Peripheral Pulses, No clubbing, cyanosis, or edema  PSYCH: AAOx3, normal behavior  NEUROLOGY: non-focal, sensory and cn 2-12 intact  SKIN: No visible rashes or lesions    LABS:                        13.5   15.35 )-----------( 278      ( 2019 16:08 )             41.2         140  |  108  |  32<H>  ----------------------------<  101<H>  3.9   |  21<L>  |  1.44<H>    Ca    9.7      2019 16:08    TPro  8.8<H>  /  Alb  3.9  /  TBili  0.7  /  DBili  x   /  AST  63<H>  /  ALT  27  /  AlkPhos  92      PT/INR - ( 2019 16:08 )   PT: 12.3 sec;   INR: 1.10 ratio         PTT - ( 2019 16:08 )  PTT:27.1 sec  CARDIAC MARKERS ( 2019 16:08 )  x     / x     / 1458 U/L / x     / x          Urinalysis Basic - ( 2019 17:34 )    Color: Yellow / Appearance: Slightly Turbid / S.020 / pH: x  Gluc: x / Ketone: Small  / Bili: Negative / Urobili: Negative mg/dL   Blood: x / Protein: 100 mg/dL / Nitrite: Negative   Leuk Esterase: Negative / RBC: 11-25 /HPF / WBC 11-25   Sq Epi: x / Non Sq Epi: Moderate / Bacteria: Moderate        RADIOLOGY & ADDITIONAL TESTS:    Imaging Personally Reviewed:  CXR clear    EKG Personally Reviewed:  NSR (2019 22:16)      PAST MEDICAL & SURGICAL HISTORY:  Urinary incontinence                                    MEDICATIONS  (STANDING):  influenza   Vaccine 0.5 milliLiter(s) IntraMuscular once  pantoprazole    Tablet 40 milliGRAM(s) Oral before breakfast  sodium chloride 0.9%. 1000 milliLiter(s) (83 mL/Hr) IV Continuous <Continuous>    MEDICATIONS  (PRN):  acetaminophen    Suspension .. 650 milliGRAM(s) Oral every 4 hours PRN Mild Pain (1 - 3)  hydrALAZINE Injectable 10 milliGRAM(s) IV Push every 4 hours PRN sbp>160  ondansetron Injectable 4 milliGRAM(s) IV Push every 6 hours PRN Nausea      FAMILY HISTORY:  NC     SOCIAL HISTORY:nonsmoker    CIGARETTES:        Vital Signs Last 24 Hrs  T(C): 36.7 (10 Nov 2019 05:53), Max: 36.9 (2019 11:24)  T(F): 98.1 (10 Nov 2019 05:53), Max: 98.4 (2019 11:24)  HR: 75 (10 Nov 2019 05:53) (75 - 82)  BP: 150/67 (10 Nov 2019 05:53) (150/55 - 150/67)  BP(mean): --  RR: 19 (2019 21:00) (19 - 20)  SpO2: 98% (10 Nov 2019 05:53) (98% - 100%)            INTERPRETATION OF TELEMETRY:    ECG:    I&O's Detail      LABS:                        11.1   9.45  )-----------( 215      ( 2019 08:42 )             35.1     -    143  |  115<H>  |  32<H>  ----------------------------<  89  3.4<L>   |  20<L>  |  1.27    Ca    8.6      2019 08:42              I&O's Summary    BNP  RADIOLOGY & ADDITIONAL STUDIES:
today

## 2019-11-11 PROCEDURE — 93010 ELECTROCARDIOGRAM REPORT: CPT

## 2019-11-11 RX ORDER — ASPIRIN/CALCIUM CARB/MAGNESIUM 324 MG
81 TABLET ORAL DAILY
Refills: 0 | Status: DISCONTINUED | OUTPATIENT
Start: 2019-11-12 | End: 2019-11-12

## 2019-11-11 RX ORDER — CLOPIDOGREL BISULFATE 75 MG/1
75 TABLET, FILM COATED ORAL DAILY
Refills: 0 | Status: DISCONTINUED | OUTPATIENT
Start: 2019-11-11 | End: 2019-11-12

## 2019-11-11 RX ORDER — LABETALOL HCL 100 MG
10 TABLET ORAL ONCE
Refills: 0 | Status: COMPLETED | OUTPATIENT
Start: 2019-11-11 | End: 2019-11-11

## 2019-11-11 RX ORDER — METOPROLOL TARTRATE 50 MG
50 TABLET ORAL DAILY
Refills: 0 | Status: DISCONTINUED | OUTPATIENT
Start: 2019-11-11 | End: 2019-11-12

## 2019-11-11 RX ORDER — METOPROLOL TARTRATE 50 MG
25 TABLET ORAL DAILY
Refills: 0 | Status: DISCONTINUED | OUTPATIENT
Start: 2019-11-11 | End: 2019-11-11

## 2019-11-11 RX ORDER — SODIUM CHLORIDE 9 MG/ML
1000 INJECTION INTRAMUSCULAR; INTRAVENOUS; SUBCUTANEOUS
Refills: 0 | Status: DISCONTINUED | OUTPATIENT
Start: 2019-11-11 | End: 2019-11-12

## 2019-11-11 RX ORDER — ATORVASTATIN CALCIUM 80 MG/1
40 TABLET, FILM COATED ORAL AT BEDTIME
Refills: 0 | Status: DISCONTINUED | OUTPATIENT
Start: 2019-11-11 | End: 2019-11-12

## 2019-11-11 RX ADMIN — SODIUM CHLORIDE 75 MILLILITER(S): 9 INJECTION INTRAMUSCULAR; INTRAVENOUS; SUBCUTANEOUS at 22:13

## 2019-11-11 RX ADMIN — Medication 5 MILLIGRAM(S): at 22:12

## 2019-11-11 RX ADMIN — Medication 50 MILLIGRAM(S): at 22:12

## 2019-11-11 RX ADMIN — PANTOPRAZOLE SODIUM 40 MILLIGRAM(S): 20 TABLET, DELAYED RELEASE ORAL at 05:35

## 2019-11-11 RX ADMIN — ATORVASTATIN CALCIUM 40 MILLIGRAM(S): 80 TABLET, FILM COATED ORAL at 22:12

## 2019-11-11 RX ADMIN — Medication 10 MILLIGRAM(S): at 18:02

## 2019-11-11 NOTE — PROGRESS NOTE ADULT - SUBJECTIVE AND OBJECTIVE BOX
Patient is a 79y old  Female who presents with a chief complaint of Generalized weakness (2019 17:40)      HPI:  HPI:  79F w/no significant PMH (pt does not got to PCP very often and not on any meds), presents after found on the floor for 2 days as she was too weak to get up.  Pt is AAOX3 (PPS) and provides her own history, dtr, Catina, at bedside and verifies.  Pt lives alone, endorses poor appetite and nausea w/ dry heaves over past week contributing to decreased PO intake.  She's become more weak over the past few days and 2 days ago, she felt too weak to go any further, so she sat down on the floor and couldn't get up d/t weakness.  She denies any fall.  Family called her, but she couldn't get to the phone.  Dtr called neighbor to check on her and pt states she couldn't get to the door.  Finally, pt BIBEMS to ED for eval.  Pt c/o lower back pain, states from being on the floor.  She denies any regular medical follow up.  She takes 400mg Alleve in the morning for "aches and pains".  She denies any recent illness, fever/chills, cough/cp/sob, abd pain, hematochezia/melena/hematemesis, dysuria, hematuria.  She endorses a few episodes of diarrhea, but no further events d/t no PO intake.  Currently, pt is pleasant and comfortable and has no c/o.    In ED, WBC 15, Cr1.44 (no baseline), CPK 1400, cxr neg, UA neg (+blood), received rocephin, IVF.    11/10- found to have at least mod to severe AS by echo      Examined at [facility], no complaints at present, awaiting [facility]    PAST MEDICAL & SURGICAL HISTORY:  Urinary incontinence      Review of Systems:   CONSTITUTIONAL: No fever.  EYES: No eye pain or discharge.  ENMT:  No sinus or throat pain  NECK: No pain or stiffness  RESPIRATORY: No cough, wheezing, chills or hemoptysis; No shortness of breath  CARDIOVASCULAR: No chest pain, palpitations, dizziness, or leg swelling  GASTROINTESTINAL: No abdominal or epigastric pain.  ++nausea, ++ dry heaves,   NO hematemesis; ++diarrhea,  No melena or hematochezia.  GENITOURINARY: No dysuria  ++incontinence  NEUROLOGICAL: No headaches, memory loss, loss of strength, numbness, or tremors  SKIN: No rashes.  MUSCULOSKELETAL: No joint pain or swelling; No muscle, back, or extremity pain  PSYCHIATRIC: No depression, anxiety, mood swings, or difficulty sleeping  HEME/LYMPH: No easy bruising, or bleeding gums    Allergies    No Known Allergies    Intolerances        Social History:   LIVES ALONE  USES CANE OCC   DENIES SMOKING/ETOH/DRUG     FAMILY HISTORY:  UNKNOWN TO THIS ELDERLY LADY     Home Medications:  NONE     MEDICATIONS  (STANDING):  pantoprazole  Injectable 40 milliGRAM(s) IV Push two times a day  sodium chloride 0.9%. 1000 milliLiter(s) (125 mL/Hr) IV Continuous <Continuous>    MEDICATIONS  (PRN):  acetaminophen    Suspension .. 650 milliGRAM(s) Oral every 4 hours PRN Mild Pain (1 - 3)  hydrALAZINE Injectable 10 milliGRAM(s) IV Push every 4 hours PRN sbp>160  ondansetron Injectable 4 milliGRAM(s) IV Push every 6 hours PRN Nausea        PHYSICAL EXAM:  Vital Signs Last 24 Hrs  T(C): 36.8 (2019 19:59), Max: 36.8 (2019 17:52)  T(F): 98.3 (2019 19:59), Max: 98.3 (2019 19:59)  HR: 100 (2019 19:59) (93 - 100)  BP: 187/82 (2019 19:59) (116/94 - 187/82)  BP(mean): --  RR: 18 (2019 19:59) (17 - 18)  SpO2: 100% (2019 19:59) (95% - 100%)  GENERAL: NAD, well-developed FOR AGE  HEAD:  Atraumatic, Normocephalic  EYES: EOMI, PERRLA, conjunctiva and sclera clear  ENT: normal hearing, no nasal discharge, throat clear, + Halitosis, normal dentition for age  NECK: Supple, No JVD, NO LAD, no thyromegaly   CHEST/LUNG: Clear to auscultation bilaterally; No wheeze, resp unlabored  HEART: Regular rate and rhythm; +ROXY III/VI  ABDOMEN: Soft, Nontender, Nondistended; Bowel sounds present, no HSM  EXTREMITIES:  2+ Peripheral Pulses, No clubbing, cyanosis, or edema  PSYCH: AAOx3, normal behavior  NEUROLOGY: non-focal, sensory and cn 2-12 intact  SKIN: No visible rashes or lesions    LABS:                        13.5   15.35 )-----------( 278      ( 2019 16:08 )             41.2     11    140  |  108  |  32<H>  ----------------------------<  101<H>  3.9   |  21<L>  |  1.44<H>    Ca    9.7      2019 16:08    TPro  8.8<H>  /  Alb  3.9  /  TBili  0.7  /  DBili  x   /  AST  63<H>  /  ALT  27  /  AlkPhos  92  11-07    PT/INR - ( 2019 16:08 )   PT: 12.3 sec;   INR: 1.10 ratio         PTT - ( 2019 16:08 )  PTT:27.1 sec  CARDIAC MARKERS ( 2019 16:08 )  x     / x     / 1458 U/L / x     / x          Urinalysis Basic - ( 2019 17:34 )    Color: Yellow / Appearance: Slightly Turbid / S.020 / pH: x  Gluc: x / Ketone: Small  / Bili: Negative / Urobili: Negative mg/dL   Blood: x / Protein: 100 mg/dL / Nitrite: Negative   Leuk Esterase: Negative / RBC: 11-25 /HPF / WBC 11-25   Sq Epi: x / Non Sq Epi: Moderate / Bacteria: Moderate        RADIOLOGY & ADDITIONAL TESTS:    Imaging Personally Reviewed:  CXR clear    EKG Personally Reviewed:  NSR (2019 22:16)      PAST MEDICAL & SURGICAL HISTORY:  Urinary incontinence                                    MEDICATIONS  (STANDING):  influenza   Vaccine 0.5 milliLiter(s) IntraMuscular once  pantoprazole    Tablet 40 milliGRAM(s) Oral before breakfast  sodium chloride 0.9%. 1000 milliLiter(s) (83 mL/Hr) IV Continuous <Continuous>    MEDICATIONS  (PRN):  acetaminophen    Suspension .. 650 milliGRAM(s) Oral every 4 hours PRN Mild Pain (1 - 3)  hydrALAZINE Injectable 10 milliGRAM(s) IV Push every 4 hours PRN sbp>160  ondansetron Injectable 4 milliGRAM(s) IV Push every 6 hours PRN Nausea      FAMILY HISTORY:  NC     SOCIAL HISTORY:nonsmoker    CIGARETTES:        Vital Signs Last 24 Hrs  T(C): 36.7 (10 Nov 2019 05:53), Max: 36.9 (2019 11:24)  T(F): 98.1 (10 Nov 2019 05:53), Max: 98.4 (2019 11:24)  HR: 75 (10 Nov 2019 05:53) (75 - 82)  BP: 150/67 (10 Nov 2019 05:53) (150/55 - 150/67)  BP(mean): --  RR: 19 (2019 21:00) (19 - 20)  SpO2: 98% (10 Nov 2019 05:53) (98% - 100%)            INTERPRETATION OF TELEMETRY:    ECG:    I&O's Detail      LABS:                        11.1   9.45  )-----------( 215      ( 2019 08:42 )             35.1         143  |  115<H>  |  32<H>  ----------------------------<  89  3.4<L>   |  20<L>  |  1.27    Ca    8.6      2019 08:42              I&O's Summary    BNP  RADIOLOGY & ADDITIONAL STUDIES:

## 2019-11-11 NOTE — PROGRESS NOTE ADULT - SUBJECTIVE AND OBJECTIVE BOX
79 F w/no significant PMH (pt does not got to PCP very often and not on any meds), presents after found on the floor for 2 days as she was too weak to get up.  Pt is AAOX3 (PPS) and provides her own history, dtrCatina, at bedside and verifies.  Pt lives alone, endorses poor appetite and nausea w/ dry heaves over past week contributing to decreased PO intake.  She's become more weak over the past few days and 2 days ago, she felt too weak to go any further, so she sat down on the floor and couldn't get up d/t weakness.  She denies any fall.  Family called her, but she couldn't get to the phone.  Dtr called neighbor to check on her and pt states she couldn't get to the door.  Finally, pt BIBEMS to ED for eval.  Pt c/o lower back pain, states from being on the floor.  She denies any regular medical follow up.  She takes 400mg Alleve in the morning for "aches and pains".  She denies any recent illness, fever/chills, cough/cp/sob, abd pain, hematochezia/melena/hematemesis, dysuria, hematuria.  She endorses a few episodes of diarrhea, but no further events d/t no PO intake.  Currently, pt is pleasant and comfortable and has no c/o.  Patient doesn't recall how she fell on the floor; she states : "i could have passed out"    11.8: no distress, tolerated diet  11.10: no distress, tele: PSVT  11.11: no distress, no cp, no sob            REVIEW OF SYSTEMS:    CONSTITUTIONAL: No weakness, No fevers or chills  ENT: No ear ache, No sorethroat  NECK: No pain, No stiffness  RESPIRATORY: No cough, No wheezing, No hemoptysis; No dyspnea  CARDIOVASCULAR: No chest pain, No palpitations  GASTROINTESTINAL: No abd pain, No nausea, No vomiting, No hematemesis, No diarrhea or constipation. No melena, No hematochezia.  GENITOURINARY: No dysuria, No  hematuria  NEUROLOGICAL: No diplopia, No paresthesia, No motor dysfunction  MUSCULOSKELETAL: No arthralgia, No myalgia  SKIN: No rashes, or lesions   PSYCH: no anxiety, no suicidal ideation    All other review of systems is negative unless indicated above    Vital Signs Last 24 Hrs  T(C): 36.7 (08 Nov 2019 15:35), Max: 36.8 (07 Nov 2019 17:52)  T(F): 98.1 (08 Nov 2019 15:35), Max: 98.3 (07 Nov 2019 19:59)  HR: 86 (08 Nov 2019 15:35) (86 - 100)  BP: 153/59 (08 Nov 2019 15:35) (121/76 - 187/82)  RR: 18 (08 Nov 2019 15:35) (17 - 18)  SpO2: 99% (08 Nov 2019 15:35) (99% - 100%)    PHYSICAL EXAM:    GENERAL: NAD, Well nourished  HEENT:  NC/AT, EOMI, PERRLA, No scleral icterus, Moist mucous membranes  NECK: Supple, No JVD  CNS:  Alert & Oriented X3, Motor Strength 5/5 B/L upper and lower extremities; DTRs 2+ intact   LUNG: Normal Breath sounds, Clear to auscultation bilaterally, No rales, No rhonchi, No wheezing  HEART: RRR; +large systolic murmurs, No rubs  ABDOMEN: +BS, ST/ND/NT  GENITOURINARY: Voiding, Bladder not distended  EXTREMITIES:  2+ Peripheral Pulses, No clubbing, No cyanosis, No tibial edema  MUSCULOSKELTAL: Joints normal ROM, No TTP, No effusion  VAGINAL: deferred  SKIN: no rashes  RECTAL: deferred, not indicated  BREAST: deferred                          11.1   12.58 )-----------( 241      ( 08 Nov 2019 08:42 )             35.2     11-08    142  |  114<H>  |  35<H>  ----------------------------<  90  3.8   |  20<L>  |  1.40<H>    Ca    8.6      08 Nov 2019 08:42  Mg     2.4     11-08    TPro  8.8<H>  /  Alb  3.9  /  TBili  0.7  /  DBili  x   /  AST  63<H>  /  ALT  27  /  AlkPhos  92  11-07    Vancomycin levels:   Cultures:     MEDICATIONS  (STANDING):  influenza   Vaccine 0.5 milliLiter(s) IntraMuscular once  pantoprazole  Injectable 40 milliGRAM(s) IV Push two times a day  sodium chloride 0.9%. 1000 milliLiter(s) (125 mL/Hr) IV Continuous <Continuous>    MEDICATIONS  (PRN):  acetaminophen    Suspension .. 650 milliGRAM(s) Oral every 4 hours PRN Mild Pain (1 - 3)  hydrALAZINE Injectable 10 milliGRAM(s) IV Push every 4 hours PRN sbp>160  ondansetron Injectable 4 milliGRAM(s) IV Push every 6 hours PRN Nausea      all labs reviewed  all imaging reviewed    a/p:    1. Syncope:  Echo: severe AS  Tele: PSVT  Cardio eval noted: for cardiac cath    might need linq placemet    2. Prerenal azotemia:  resolved  d/c IVFluids    3. Rhabdomyolysis   resolved    4. Duodenitis:  c/w PPI    5. Endometrial thickening   outpt f/u with GYN

## 2019-11-11 NOTE — PACU DISCHARGE NOTE - COMMENTS
patient discharged to CICU. Right groin site benign, soft nontender. dressing clean/dry/intact. IVL site benign. Patient without any complaints. Vital signs stable. Post procedure instructions given and understood by patient and family via teach back. Will continue to monitor patient status. Report given to CICU RN.

## 2019-11-11 NOTE — PROGRESS NOTE ADULT - SUBJECTIVE AND OBJECTIVE BOX
HPI:  79F w/no significant PMH (pt does not got to PCP very often and not on any meds), presented to ED after found on the floor for 2 days as she was too weak to get up. Pt reported recent poor appetite and nausea w/ dry heaves over past week contributing to decreased PO intake, c/o lower back pain.  In ED, WBC 15, Cr1.44 (no baseline), CPK 1400, cxr neg, UA neg (+blood), received rocephin, IVF.    Over the course, pt was found to have moderate-severe AS on echo.  Pt brought to cath lab for ischemic evaluation.     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   Dual antiplatelet therapy for at least one year  < end of copied text >    ROS: denies chest pain/ pressure, SOB or palpitation     Vital Signs;  T(C): 36.3 (11-11-19 @ 15:45), Max: 37.1 (11-10-19 @ 20:15)  HR: 85 (11-11-19 @ 18:25) (68 - 90)  BP: 155/81 (11-11-19 @ 18:25) (146/61 - 178/82)  RR: 16 (11-11-19 @ 18:25) (16 - 18)  SpO2: 97% (11-11-19 @ 18:25) (95% - 100%)    Physical Exam   General: awake, no acute distress   HEENT: NCAT, neck supple   CV: RRR, normal S1S2, + 3/6 systolic murmur, no rub/ gallop   Pulmonary: clear, no wheezing or rales   GI: +BS, soft, non-tender, non-distended   : voiding freely   Extremities: + 1 pitting edema in B/L LE,  + pedal pulses   Skin: no rashes or lesion. Rt. femoral access site (s/p mynx): noted to have small hematoma right post procedure, applied manual compression for 20 min, now soft and no further hematoma noted, no bleeding.     # s/p PCI with ADDISON x1 to mid RCA, moderate AS   - CICU monitor   - IV hydration  - Labs and EKG in am   - start ASA 81 mg daily and Plavix 75 mg daily (given  mg and Plavix 600 mg in cath lab)   - start Metoprolol 25 mg daily   - start Lipitor 40 mg daily   - post procedure, outcome and follow up care reviewed with patient/ MD  - follow up with cardilogist in 4-7 days HPI:  79F w/no significant PMH (pt does not got to PCP very often and not on any meds), presented to ED after found on the floor for 2 days as she was too weak to get up. Pt reported recent poor appetite and nausea w/ dry heaves over past week contributing to decreased PO intake, c/o lower back pain.  In ED, WBC 15, Cr1.44 (no baseline), CPK 1400, cxr neg, UA neg (+blood), received rocephin, IVF.    Over the course, pt was found to have moderate-severe AS on echo.  Pt brought to cath lab for ischemic evaluation.     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   Dual antiplatelet therapy for at least one year  < end of copied text >    ROS: denies chest pain/ pressure, SOB or palpitation     Vital Signs;  T(C): 36.3 (11-11-19 @ 15:45), Max: 37.1 (11-10-19 @ 20:15)  HR: 85 (11-11-19 @ 18:25) (68 - 90)  BP: 155/81 (11-11-19 @ 18:25) (146/61 - 178/82)  RR: 16 (11-11-19 @ 18:25) (16 - 18)  SpO2: 97% (11-11-19 @ 18:25) (95% - 100%)    Physical Exam   General: awake, no acute distress   HEENT: NCAT, neck supple   CV: RRR, normal S1S2, + 3/6 systolic murmur, no rub/ gallop   Pulmonary: clear, no wheezing or rales   GI: +BS, soft, non-tender, non-distended   : voiding freely   Extremities: + 1 pitting edema in B/L LE,  + pedal pulses   Skin: no rashes or lesion. Rt. femoral access site (s/p mynx): noted to have small hematoma right post procedure, applied manual compression for 20 min, now soft and no further hematoma noted, no bleeding.     # s/p PCI with ADDISON x1 to mid RCA, moderate AS   - CICU monitor   - IV hydration  - Labs and EKG in am   - start ASA 81 mg daily and Plavix 75 mg daily (given  mg and Plavix 600 mg in cath lab)   - start Toprol 50 mg daily   - start Lipitor 40 mg daily   - post procedure, outcome and follow up care reviewed with patient/ MD  - follow up with cardilogist in 4-7 days

## 2019-11-12 VITALS — HEART RATE: 73 BPM | SYSTOLIC BLOOD PRESSURE: 138 MMHG | DIASTOLIC BLOOD PRESSURE: 62 MMHG

## 2019-11-12 DIAGNOSIS — R93.89 ABNORMAL FINDINGS ON DIAGNOSTIC IMAGING OF OTHER SPECIFIED BODY STRUCTURES: ICD-10-CM

## 2019-11-12 LAB
ALBUMIN SERPL ELPH-MCNC: 2.7 G/DL — LOW (ref 3.3–5)
ALP SERPL-CCNC: 57 U/L — SIGNIFICANT CHANGE UP (ref 40–120)
ALT FLD-CCNC: 18 U/L — SIGNIFICANT CHANGE UP (ref 12–78)
ANION GAP SERPL CALC-SCNC: 7 MMOL/L — SIGNIFICANT CHANGE UP (ref 5–17)
AST SERPL-CCNC: 18 U/L — SIGNIFICANT CHANGE UP (ref 15–37)
BASOPHILS # BLD AUTO: 0.04 K/UL — SIGNIFICANT CHANGE UP (ref 0–0.2)
BASOPHILS NFR BLD AUTO: 0.5 % — SIGNIFICANT CHANGE UP (ref 0–2)
BILIRUB SERPL-MCNC: 0.5 MG/DL — SIGNIFICANT CHANGE UP (ref 0.2–1.2)
BUN SERPL-MCNC: 21 MG/DL — SIGNIFICANT CHANGE UP (ref 7–23)
CALCIUM SERPL-MCNC: 8.9 MG/DL — SIGNIFICANT CHANGE UP (ref 8.5–10.1)
CHLORIDE SERPL-SCNC: 107 MMOL/L — SIGNIFICANT CHANGE UP (ref 96–108)
CHOLEST SERPL-MCNC: 143 MG/DL — SIGNIFICANT CHANGE UP (ref 10–199)
CO2 SERPL-SCNC: 26 MMOL/L — SIGNIFICANT CHANGE UP (ref 22–31)
CREAT SERPL-MCNC: 1.29 MG/DL — SIGNIFICANT CHANGE UP (ref 0.5–1.3)
EOSINOPHIL # BLD AUTO: 0.31 K/UL — SIGNIFICANT CHANGE UP (ref 0–0.5)
EOSINOPHIL NFR BLD AUTO: 3.6 % — SIGNIFICANT CHANGE UP (ref 0–6)
GLUCOSE SERPL-MCNC: 107 MG/DL — HIGH (ref 70–99)
HCT VFR BLD CALC: 29.1 % — LOW (ref 34.5–45)
HDLC SERPL-MCNC: 45 MG/DL — LOW
HGB BLD-MCNC: 9.6 G/DL — LOW (ref 11.5–15.5)
IMM GRANULOCYTES NFR BLD AUTO: 0.5 % — SIGNIFICANT CHANGE UP (ref 0–1.5)
LIPID PNL WITH DIRECT LDL SERPL: 86 MG/DL — SIGNIFICANT CHANGE UP
LYMPHOCYTES # BLD AUTO: 2.48 K/UL — SIGNIFICANT CHANGE UP (ref 1–3.3)
LYMPHOCYTES # BLD AUTO: 28.5 % — SIGNIFICANT CHANGE UP (ref 13–44)
MCHC RBC-ENTMCNC: 30.1 PG — SIGNIFICANT CHANGE UP (ref 27–34)
MCHC RBC-ENTMCNC: 33 GM/DL — SIGNIFICANT CHANGE UP (ref 32–36)
MCV RBC AUTO: 91.2 FL — SIGNIFICANT CHANGE UP (ref 80–100)
MONOCYTES # BLD AUTO: 0.77 K/UL — SIGNIFICANT CHANGE UP (ref 0–0.9)
MONOCYTES NFR BLD AUTO: 8.9 % — SIGNIFICANT CHANGE UP (ref 2–14)
NEUTROPHILS # BLD AUTO: 5.05 K/UL — SIGNIFICANT CHANGE UP (ref 1.8–7.4)
NEUTROPHILS NFR BLD AUTO: 58 % — SIGNIFICANT CHANGE UP (ref 43–77)
PLATELET # BLD AUTO: 208 K/UL — SIGNIFICANT CHANGE UP (ref 150–400)
POTASSIUM SERPL-MCNC: 3.6 MMOL/L — SIGNIFICANT CHANGE UP (ref 3.5–5.3)
POTASSIUM SERPL-SCNC: 3.6 MMOL/L — SIGNIFICANT CHANGE UP (ref 3.5–5.3)
PROT SERPL-MCNC: 6.4 GM/DL — SIGNIFICANT CHANGE UP (ref 6–8.3)
RBC # BLD: 3.19 M/UL — LOW (ref 3.8–5.2)
RBC # FLD: 13 % — SIGNIFICANT CHANGE UP (ref 10.3–14.5)
SODIUM SERPL-SCNC: 140 MMOL/L — SIGNIFICANT CHANGE UP (ref 135–145)
TOTAL CHOLESTEROL/HDL RATIO MEASUREMENT: 3.2 RATIO — LOW (ref 3.3–7.1)
TRIGL SERPL-MCNC: 60 MG/DL — SIGNIFICANT CHANGE UP (ref 10–149)
WBC # BLD: 8.69 K/UL — SIGNIFICANT CHANGE UP (ref 3.8–10.5)
WBC # FLD AUTO: 8.69 K/UL — SIGNIFICANT CHANGE UP (ref 3.8–10.5)

## 2019-11-12 PROCEDURE — 93010 ELECTROCARDIOGRAM REPORT: CPT

## 2019-11-12 RX ORDER — CLOPIDOGREL BISULFATE 75 MG/1
1 TABLET, FILM COATED ORAL
Qty: 30 | Refills: 0
Start: 2019-11-12

## 2019-11-12 RX ORDER — ASPIRIN/CALCIUM CARB/MAGNESIUM 324 MG
1 TABLET ORAL
Qty: 0 | Refills: 0 | DISCHARGE
Start: 2019-11-12

## 2019-11-12 RX ORDER — METOPROLOL TARTRATE 50 MG
1 TABLET ORAL
Qty: 30 | Refills: 0
Start: 2019-11-12

## 2019-11-12 RX ORDER — ATORVASTATIN CALCIUM 80 MG/1
1 TABLET, FILM COATED ORAL
Qty: 30 | Refills: 0
Start: 2019-11-12

## 2019-11-12 RX ORDER — PANTOPRAZOLE SODIUM 20 MG/1
1 TABLET, DELAYED RELEASE ORAL
Qty: 30 | Refills: 0
Start: 2019-11-12

## 2019-11-12 RX ADMIN — INFLUENZA VIRUS VACCINE 0.5 MILLILITER(S): 15; 15; 15; 15 SUSPENSION INTRAMUSCULAR at 13:36

## 2019-11-12 RX ADMIN — PANTOPRAZOLE SODIUM 40 MILLIGRAM(S): 20 TABLET, DELAYED RELEASE ORAL at 06:25

## 2019-11-12 RX ADMIN — Medication 81 MILLIGRAM(S): at 13:38

## 2019-11-12 RX ADMIN — CLOPIDOGREL BISULFATE 75 MILLIGRAM(S): 75 TABLET, FILM COATED ORAL at 13:38

## 2019-11-12 RX ADMIN — Medication 50 MILLIGRAM(S): at 06:25

## 2019-11-12 NOTE — DISCHARGE NOTE PROVIDER - NSDCCPCAREPLAN_GEN_ALL_CORE_FT
PRINCIPAL DISCHARGE DIAGNOSIS  Diagnosis: CAD in native artery  Assessment and Plan of Treatment: s/p PCI of RCA      SECONDARY DISCHARGE DIAGNOSES  Diagnosis: Endometrial thickening on ultrasound  Assessment and Plan of Treatment: follow up with GYN    Diagnosis: Duodenitis  Assessment and Plan of Treatment: follow up with GI

## 2019-11-12 NOTE — DISCHARGE NOTE PROVIDER - NSDCMRMEDTOKEN_GEN_ALL_CORE_FT
aspirin 81 mg oral tablet, chewable: 1 tab(s) orally once a day  atorvastatin 40 mg oral tablet: 1 tab(s) orally once a day (at bedtime)  clopidogrel 75 mg oral tablet: 1 tab(s) orally once a day  metoprolol succinate 50 mg oral tablet, extended release: 1 tab(s) orally once a day  pantoprazole 40 mg oral delayed release tablet: 1 tab(s) orally once a day (before a meal)

## 2019-11-12 NOTE — DISCHARGE NOTE NURSING/CASE MANAGEMENT/SOCIAL WORK - PATIENT PORTAL LINK FT
You can access the FollowMyHealth Patient Portal offered by [facility] by registering at the following website: [email or web address]. By joining [facility]’s FollowMyHealth portal, you will also be able to view your health information using other applications (apps) compatible with our system.

## 2019-11-12 NOTE — PROGRESS NOTE ADULT - SUBJECTIVE AND OBJECTIVE BOX
Cardiology NP     Patient is a 79y old  Female who presents with a chief complaint of Generalized weakness (11 Nov 2019 18:36)    HPI:  79F w/no significant PMH (pt does not got to PCP very often and not on any meds), presents after found on the floor for 2 days as she was too weak to get up.  Pt is AAOX3 (PPS) and provides her own history, dtr, Catina, at bedside and verifies.  Pt lives alone, endorses poor appetite and nausea w/ dry heaves over past week contributing to decreased PO intake.  She's become more weak over the past few days and 2 days ago, she felt too weak to go any further, so she sat down on the floor and couldn't get up d/t weakness.              MEDICATIONS  (STANDING):  aspirin  chewable 81 milliGRAM(s) Oral daily  atorvastatin 40 milliGRAM(s) Oral at bedtime  clopidogrel Tablet 75 milliGRAM(s) Oral daily  influenza   Vaccine 0.5 milliLiter(s) IntraMuscular once  melatonin 5 milliGRAM(s) Oral at bedtime  metoprolol succinate ER 50 milliGRAM(s) Oral daily  pantoprazole    Tablet 40 milliGRAM(s) Oral before breakfast  sodium chloride 0.9%. 1000 milliLiter(s) (75 mL/Hr) IV Continuous <Continuous>    MEDICATIONS  (PRN):  acetaminophen    Suspension .. 650 milliGRAM(s) Oral every 4 hours PRN Mild Pain (1 - 3)  hydrALAZINE Injectable 10 milliGRAM(s) IV Push every 4 hours PRN sbp>160  ondansetron Injectable 4 milliGRAM(s) IV Push every 6 hours PRN Nausea      Allergies    No Known Allergies      REVIEW OF SYSTEMS: As mentioned in HPI all others Negative     Vital Signs Last 24 Hrs  T(C): 36.8 (12 Nov 2019 00:26), Max: 36.8 (12 Nov 2019 00:26)  T(F): 98.2 (12 Nov 2019 00:26), Max: 98.2 (12 Nov 2019 00:26)  HR: 68 (12 Nov 2019 04:00) (68 - 90)  BP: 140/66 (12 Nov 2019 04:00) (137/71 - 178/82)  BP(mean): 82 (12 Nov 2019 04:00) (82 - 105)  RR: 18 (12 Nov 2019 04:00) (11 - 25)  SpO2: 96% (12 Nov 2019 04:00) (93% - 100%)    PHYSICAL EXAM:  NERVOUS SYSTEM:  Alert & Oriented X3, Good concentration  CHEST/LUNG: Clear to auscultation bilaterally; No rales, rhonchi, wheezing, or rubs  HEART: Regular rate and rhythm; No murmurs, rubs, or gallops  ABDOMEN: Soft, Nontender, Nondistended; Bowel sounds present  EXTREMITIES:  2+ Peripheral Pulses, No clubbing, cyanosis, or edema  SKIN: Right femoral cath site without bleeding or hematoma      LABS:                        9.6    8.69  )-----------( 208      ( 12 Nov 2019 06:45 )             29.1     11-12    140  |  107  |  21  ----------------------------<  107<H>  3.6   |  26  |  1.29    Ca    8.9      12 Nov 2019 06:45    TPro  6.4  /  Alb  2.7<L>  /  TBili  0.5  /  DBili  x   /  AST  18  /  ALT  18  /  AlkPhos  57  11-12

## 2019-11-12 NOTE — PROGRESS NOTE ADULT - ASSESSMENT
79F w/no significant PMH (pt does not got to PCP very often and not on any meds), presents after found on the floor for 2 days as she was too weak to get up.  Pt is AAOX3 (PPS) and provides her own history, dtr, Catina, at bedside and verifies.  Pt lives alone, endorses poor appetite and nausea w/ dry heaves over past week contributing to decreased PO intake.  She's become more weak over the past few days and 2 days ago, she felt too weak to go any further, so she sat down on the floor and couldn't get up d/t weakness.  She denies any fall.  Family called her, but she couldn't get to the phone.  Dtr called neighbor to check on her and pt states she couldn't get to the door.  Finally, pt BIBEMS to ED for eval.  Pt c/o lower back pain, states from being on the floor.  She denies any regular medical follow up.  She takes 400mg Alleve in the morning for "aches and pains".  She denies any recent illness, fever/chills, cough/cp/sob, abd pain, hematochezia/melena/hematemesis, dysuria, hematuria.  She endorses a few episodes of diarrhea, but no further events d/t no PO intake.  Currently, pt is pleasant and comfortable and has no c/o.  Severe AS by echo  [facility] today to determine obstructive CAD  Will follow
Imp:  N/V, resolved  CT's main findings in uterine lesion    Rec:  RECOMMENDED PATIENT FOLLOW UP WITH GYN  Patient verbalizes understanding  Also encouraged patient follow up with me outpatient
Patient now s/p angiogram  with PCI and ADDISON to the LCX    - monitor in CICU  - AM labs and EKG reviewed , monitor H/H .renal fx   - procedure, outcome and f/u care reviewed with patient  - continue ASA / Plavix  - continue statin  - continue hospitalist service   - f/u with MD in 4-7 days
79F w/no significant PMH (pt does not got to PCP very often and not on any meds), presents after found on the floor for 2 days as she was too weak to get up.  Pt is AAOX3 (PPS) and provides her own history, dtr, Catina, at bedside and verifies.  Pt lives alone, endorses poor appetite and nausea w/ dry heaves over past week contributing to decreased PO intake.  She's become more weak over the past few days and 2 days ago, she felt too weak to go any further, so she sat down on the floor and couldn't get up d/t weakness.  She denies any fall.  Family called her, but she couldn't get to the phone.  Dtr called neighbor to check on her and pt states she couldn't get to the door.  Finally, pt BIBEMS to ED for eval.  Pt c/o lower back pain, states from being on the floor.  She denies any regular medical follow up.  She takes 400mg Alleve in the morning for "aches and pains".  She denies any recent illness, fever/chills, cough/cp/sob, abd pain, hematochezia/melena/hematemesis, dysuria, hematuria.  She endorses a few episodes of diarrhea, but no further events d/t no PO intake.  Currently, pt is pleasant and comfortable and has no c/o.  Severe AS by echo but only moderate AS by CAth   tight RCA lesion could be the reason she had syncope   s/p RCA ADDISON   seems stable for DC we will follow her AS with serial echos

## 2019-11-12 NOTE — DISCHARGE NOTE PROVIDER - PROVIDER TOKENS
PROVIDER:[TOKEN:[[id number]]],PROVIDER:[TOKEN:[[id number]]],FREE:[LAST:[GYN],PHONE:[(   )    -],FAX:[(   )    -]]

## 2019-11-12 NOTE — DISCHARGE NOTE PROVIDER - HOSPITAL COURSE
79 F w/no significant PMH (pt does not got to PCP very often and not on any meds), presents after found on the floor for 2 days as she was too weak to get up.  Pt is AAOX3 (PPS) and provides her own history, dtr, Catina, at bedside and verifies.  Pt lives alone, endorses poor appetite and nausea w/ dry heaves over past week contributing to decreased PO intake.  She's become more weak over the past few days and 2 days ago, she felt too weak to go any further, so she sat down on the floor and couldn't get up d/t weakness.  She denies any fall.  Family called her, but she couldn't get to the phone.  Dtr called neighbor to check on her and pt states she couldn't get to the door.  Finally, pt BIBEMS to ED for eval.  Pt c/o lower back pain, states from being on the floor.  She denies any regular medical follow up.  She takes 400mg Alleve in the morning for "aches and pains".  She denies any recent illness, fever/chills, cough/cp/sob, abd pain, hematochezia/melena/hematemesis, dysuria, hematuria.  She endorses a few episodes of diarrhea, but no further events d/t no PO intake.  Currently, pt is pleasant and comfortable and has no c/o.    Patient doesn't recall how she fell on the floor; she states : "i could have passed out"        11.8: no distress, tolerated diet    11.10: no distress, tele: PSVT    11.11: no distress, no cp, no sob    11.12: no distress, c/p PCI to RCA yesterday; cleared for dc by cardiology                    REVIEW OF SYSTEMS:        CONSTITUTIONAL: No weakness, No fevers or chills    ENT: No ear ache, No sorethroat    NECK: No pain, No stiffness    RESPIRATORY: No cough, No wheezing, No hemoptysis; No dyspnea    CARDIOVASCULAR: No chest pain, No palpitations    GASTROINTESTINAL: No abd pain, No nausea, No vomiting, No hematemesis, No diarrhea or constipation. No melena, No hematochezia.    GENITOURINARY: No dysuria, No  hematuria    NEUROLOGICAL: No diplopia, No paresthesia, No motor dysfunction    MUSCULOSKELETAL: No arthralgia, No myalgia    SKIN: No rashes, or lesions     PSYCH: no anxiety, no suicidal ideation        All other review of systems is negative unless indicated above        Vital Signs Last 24 Hrs    T(C): 36.8 (12 Nov 2019 00:26), Max: 36.8 (12 Nov 2019 00:26)    T(F): 98.2 (12 Nov 2019 00:26), Max: 98.2 (12 Nov 2019 00:26)    HR: 73 (12 Nov 2019 12:37) (66 - 90)    BP: 138/62 (12 Nov 2019 12:37) (137/71 - 178/82)    BP(mean): 82 (12 Nov 2019 12:37) (80 - 105)    RR: 18 (12 Nov 2019 04:00) (11 - 25)    SpO2: 96% (12 Nov 2019 04:00) (93% - 100%)        PHYSICAL EXAM:        GENERAL: NAD, Well nourished    HEENT:  NC/AT, EOMI, PERRLA, No scleral icterus, Moist mucous membranes    NECK: Supple, No JVD    CNS:  Alert & Oriented X3, Motor Strength 5/5 B/L upper and lower extremities; DTRs 2+ intact     LUNG: Normal Breath sounds, Clear to auscultation bilaterally, No rales, No rhonchi, No wheezing    HEART: RRR; +large systolic murmurs, No rubs    ABDOMEN: +BS, ST/ND/NT    GENITOURINARY: Voiding, Bladder not distended    EXTREMITIES:  2+ Peripheral Pulses, No clubbing, No cyanosis, No tibial edema    MUSCULOSKELTAL: Joints normal ROM, No TTP, No effusion    VAGINAL: deferred    SKIN: no rashes    RECTAL: deferred, not indicated    BREAST: deferred        MEDICATIONS  (STANDING):    aspirin  chewable 81 milliGRAM(s) Oral daily    atorvastatin 40 milliGRAM(s) Oral at bedtime    clopidogrel Tablet 75 milliGRAM(s) Oral daily    influenza   Vaccine 0.5 milliLiter(s) IntraMuscular once    melatonin 5 milliGRAM(s) Oral at bedtime    metoprolol succinate ER 50 milliGRAM(s) Oral daily    pantoprazole    Tablet 40 milliGRAM(s) Oral before breakfast    sodium chloride 0.9%. 1000 milliLiter(s) (75 mL/Hr) IV Continuous <Continuous>         a/p:        1. Syncope likely vaso vagal in the setting of dehydration, AS, CAD:    Echo: severe AS but cardiac cath revealed only moderate AS    Cardiac cath: s/p PCI of RCA    Tele: PSVT, c/w bblockers, ASA/Plavix/Statin         outpt f/u with cardiology         2. Prerenal azotemia:    resolved    d/c IVFluids        3. Rhabdomyolysis     resolved        4. Duodenitis:     c/w PPI    GI f/u outpt        5. Endometrial thickening     outpt f/u with GYN        dc planning, time 39min 79 F w/no significant PMH (pt does not got to PCP very often and not on any meds), presents after found on the floor for 2 days as she was too weak to get up.  Pt is AAOX3 (PPS) and provides her own history, dtr, Catina, at bedside and verifies.  Pt lives alone, endorses poor appetite and nausea w/ dry heaves over past week contributing to decreased PO intake.  She's become more weak over the past few days and 2 days ago, she felt too weak to go any further, so she sat down on the floor and couldn't get up d/t weakness.  She denies any fall.  Family called her, but she couldn't get to the phone.  Dtr called neighbor to check on her and pt states she couldn't get to the door.  Finally, pt BIBEMS to ED for eval.  Pt c/o lower back pain, states from being on the floor.  She denies any regular medical follow up.  She takes 400mg Alleve in the morning for "aches and pains".  She denies any recent illness, fever/chills, cough/cp/sob, abd pain, hematochezia/melena/hematemesis, dysuria, hematuria.  She endorses a few episodes of diarrhea, but no further events d/t no PO intake.  Currently, pt is pleasant and comfortable and has no c/o.    Patient doesn't recall how she fell on the floor; she states : "i could have passed out"        11.8: no distress, tolerated diet    11.10: no distress, tele: PSVT    11.11: no distress, no cp, no sob    11.12: no distress, c/p PCI to RCA yesterday; cleared for dc by cardiology                    REVIEW OF SYSTEMS:        CONSTITUTIONAL: No weakness, No fevers or chills    ENT: No ear ache, No sorethroat    NECK: No pain, No stiffness    RESPIRATORY: No cough, No wheezing, No hemoptysis; No dyspnea    CARDIOVASCULAR: No chest pain, No palpitations    GASTROINTESTINAL: No abd pain, No nausea, No vomiting, No hematemesis, No diarrhea or constipation. No melena, No hematochezia.    GENITOURINARY: No dysuria, No  hematuria    NEUROLOGICAL: No diplopia, No paresthesia, No motor dysfunction    MUSCULOSKELETAL: No arthralgia, No myalgia    SKIN: No rashes, or lesions     PSYCH: no anxiety, no suicidal ideation        All other review of systems is negative unless indicated above        Vital Signs Last 24 Hrs    T(C): 36.8 (12 Nov 2019 00:26), Max: 36.8 (12 Nov 2019 00:26)    T(F): 98.2 (12 Nov 2019 00:26), Max: 98.2 (12 Nov 2019 00:26)    HR: 73 (12 Nov 2019 12:37) (66 - 90)    BP: 138/62 (12 Nov 2019 12:37) (137/71 - 178/82)    BP(mean): 82 (12 Nov 2019 12:37) (80 - 105)    RR: 18 (12 Nov 2019 04:00) (11 - 25)    SpO2: 96% (12 Nov 2019 04:00) (93% - 100%)        PHYSICAL EXAM:        GENERAL: NAD, Well nourished    HEENT:  NC/AT, EOMI, PERRLA, No scleral icterus, Moist mucous membranes    NECK: Supple, No JVD    CNS:  Alert & Oriented X3, Motor Strength 5/5 B/L upper and lower extremities; DTRs 2+ intact     LUNG: Normal Breath sounds, Clear to auscultation bilaterally, No rales, No rhonchi, No wheezing    HEART: RRR; +large systolic murmurs, No rubs    ABDOMEN: +BS, ST/ND/NT    GENITOURINARY: Voiding, Bladder not distended    EXTREMITIES:  2+ Peripheral Pulses, No clubbing, No cyanosis, No tibial edema    MUSCULOSKELTAL: Joints normal ROM, No TTP, No effusion    VAGINAL: deferred    SKIN: no rashes    RECTAL: deferred, not indicated    BREAST: deferred        MEDICATIONS  (STANDING):    aspirin  chewable 81 milliGRAM(s) Oral daily    atorvastatin 40 milliGRAM(s) Oral at bedtime    clopidogrel Tablet 75 milliGRAM(s) Oral daily    influenza   Vaccine 0.5 milliLiter(s) IntraMuscular once    melatonin 5 milliGRAM(s) Oral at bedtime    metoprolol succinate ER 50 milliGRAM(s) Oral daily    pantoprazole    Tablet 40 milliGRAM(s) Oral before breakfast    sodium chloride 0.9%. 1000 milliLiter(s) (75 mL/Hr) IV Continuous <Continuous>         a/p:        1. Syncope likely vaso vagal in the setting of dehydration, AS, CAD:    Echo: severe AS but cardiac cath revealed only moderate AS    Cardiac cath: s/p PCI of RCA    Tele: PSVT, c/w bblockers, ASA/Plavix/Statin         Troponin was normal x 3     There was no evidence of MI        outpt f/u with cardiology         2. Prerenal azotemia:    resolved    d/c IVFluids        3. Rhabdomyolysis     resolved        4. Duodenitis:     c/w PPI    GI f/u outpt        5. Endometrial thickening     outpt f/u with GYN        dc planning, time 39min

## 2019-11-12 NOTE — DISCHARGE NOTE PROVIDER - CARE PROVIDERS DIRECT ADDRESSES
[email or web address],DirectAddress_Unknown

## 2019-11-12 NOTE — DISCHARGE NOTE PROVIDER - CARE PROVIDER_API CALL
Lucio Doyle)  Cardiovascular Disease; Nuclear Cardiology  [address] [place] [address]  Phone: [phone]  Fax: [phone]  Follow Up Time:     Loc Mercado)  Gastroenterology; Internal Medicine  [address], [address]  [place] [address]  Phone: [phone]  Fax: [phone]  Follow Up Time:     GYN,   Phone: (   )    -  Fax: (   )    -  Follow Up Time:

## 2019-11-12 NOTE — PROGRESS NOTE ADULT - SUBJECTIVE AND OBJECTIVE BOX
Patient is a 79y old  Female who presents with a chief complaint of Generalized weakness (12 Nov 2019 08:27)    11/12- denies Sxs today     MEDICATIONS  (STANDING):  aspirin  chewable 81 milliGRAM(s) Oral daily  atorvastatin 40 milliGRAM(s) Oral at bedtime  clopidogrel Tablet 75 milliGRAM(s) Oral daily  influenza   Vaccine 0.5 milliLiter(s) IntraMuscular once  melatonin 5 milliGRAM(s) Oral at bedtime  metoprolol succinate ER 50 milliGRAM(s) Oral daily  pantoprazole    Tablet 40 milliGRAM(s) Oral before breakfast  sodium chloride 0.9%. 1000 milliLiter(s) (75 mL/Hr) IV Continuous <Continuous>    MEDICATIONS  (PRN):  acetaminophen    Suspension .. 650 milliGRAM(s) Oral every 4 hours PRN Mild Pain (1 - 3)  hydrALAZINE Injectable 10 milliGRAM(s) IV Push every 4 hours PRN sbp>160  ondansetron Injectable 4 milliGRAM(s) IV Push every 6 hours PRN Nausea            Vital Signs Last 24 Hrs  T(C): 36.8 (12 Nov 2019 00:26), Max: 36.8 (12 Nov 2019 00:26)  T(F): 98.2 (12 Nov 2019 00:26), Max: 98.2 (12 Nov 2019 00:26)  HR: 68 (12 Nov 2019 04:00) (68 - 90)  BP: 140/66 (12 Nov 2019 04:00) (137/71 - 178/82)  BP(mean): 82 (12 Nov 2019 04:00) (82 - 105)  RR: 18 (12 Nov 2019 04:00) (11 - 25)  SpO2: 96% (12 Nov 2019 04:00) (93% - 100%)            INTERPRETATION OF TELEMETRY:    ECG:        LABS:                        9.6    8.69  )-----------( 208      ( 12 Nov 2019 06:45 )             29.1     11-12    140  |  107  |  21  ----------------------------<  107<H>  3.6   |  26  |  1.29    Ca    8.9      12 Nov 2019 06:45    TPro  6.4  /  Alb  2.7<L>  /  TBili  0.5  /  DBili  x   /  AST  18  /  ALT  18  /  AlkPhos  57  11-12            I&O's Summary    11 Nov 2019 07:01  -  12 Nov 2019 07:00  --------------------------------------------------------  IN: 0 mL / OUT: 300 mL / NET: -300 mL      BNP  RADIOLOGY & ADDITIONAL STUDIES:

## 2019-11-13 LAB
CULTURE RESULTS: SIGNIFICANT CHANGE UP
CULTURE RESULTS: SIGNIFICANT CHANGE UP
SPECIMEN SOURCE: SIGNIFICANT CHANGE UP
SPECIMEN SOURCE: SIGNIFICANT CHANGE UP

## 2019-11-20 DIAGNOSIS — I35.0 NONRHEUMATIC AORTIC (VALVE) STENOSIS: ICD-10-CM

## 2019-11-20 DIAGNOSIS — R55 SYNCOPE AND COLLAPSE: ICD-10-CM

## 2019-11-20 DIAGNOSIS — E86.0 DEHYDRATION: ICD-10-CM

## 2019-11-20 DIAGNOSIS — R11.2 NAUSEA WITH VOMITING, UNSPECIFIED: ICD-10-CM

## 2019-11-20 DIAGNOSIS — I47.2 VENTRICULAR TACHYCARDIA: ICD-10-CM

## 2019-11-20 DIAGNOSIS — I97.630 POSTPROCEDURAL HEMATOMA OF A CIRCULATORY SYSTEM ORGAN OR STRUCTURE FOLLOWING A CARDIAC CATHETERIZATION: ICD-10-CM

## 2019-11-20 DIAGNOSIS — I25.10 ATHEROSCLEROTIC HEART DISEASE OF NATIVE CORONARY ARTERY WITHOUT ANGINA PECTORIS: ICD-10-CM

## 2019-11-20 DIAGNOSIS — R32 UNSPECIFIED URINARY INCONTINENCE: ICD-10-CM

## 2019-11-20 DIAGNOSIS — R79.89 OTHER SPECIFIED ABNORMAL FINDINGS OF BLOOD CHEMISTRY: ICD-10-CM

## 2019-11-20 DIAGNOSIS — R31.9 HEMATURIA, UNSPECIFIED: ICD-10-CM

## 2019-11-20 DIAGNOSIS — N19 UNSPECIFIED KIDNEY FAILURE: ICD-10-CM

## 2019-11-20 DIAGNOSIS — R93.89 ABNORMAL FINDINGS ON DIAGNOSTIC IMAGING OF OTHER SPECIFIED BODY STRUCTURES: ICD-10-CM

## 2019-11-20 DIAGNOSIS — R03.0 ELEVATED BLOOD-PRESSURE READING, WITHOUT DIAGNOSIS OF HYPERTENSION: ICD-10-CM
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
## 2019-11-20 DIAGNOSIS — K29.80 DUODENITIS WITHOUT BLEEDING: ICD-10-CM

## 2019-11-20 DIAGNOSIS — M62.82 RHABDOMYOLYSIS: ICD-10-CM

## 2019-11-20 DIAGNOSIS — R01.1 CARDIAC MURMUR, UNSPECIFIED: ICD-10-CM
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
## 2024-02-11 NOTE — ED PROVIDER NOTE - CARDIAC, MLM
Normal rate, regular rhythm.  Heart sounds S1, S2.  No murmurs, rubs or gallops. No assistance needed

## 2024-06-23 ENCOUNTER — EMERGENCY (EMERGENCY)
Facility: HOSPITAL | Age: 84
LOS: 1 days | Discharge: HOME | End: 2024-06-23
Attending: EMERGENCY MEDICINE | Admitting: EMERGENCY MEDICINE
Payer: MEDICARE

## 2024-06-23 VITALS
TEMPERATURE: 98 F | HEART RATE: 76 BPM | DIASTOLIC BLOOD PRESSURE: 73 MMHG | OXYGEN SATURATION: 96 % | RESPIRATION RATE: 16 BRPM | SYSTOLIC BLOOD PRESSURE: 163 MMHG

## 2024-06-23 VITALS
HEIGHT: 61 IN | RESPIRATION RATE: 18 BRPM | SYSTOLIC BLOOD PRESSURE: 172 MMHG | DIASTOLIC BLOOD PRESSURE: 79 MMHG | TEMPERATURE: 98 F | OXYGEN SATURATION: 99 % | WEIGHT: 145.06 LBS | HEART RATE: 85 BPM

## 2024-06-23 RX ORDER — ACETAMINOPHEN 500 MG
650 TABLET ORAL ONCE
Refills: 0 | Status: COMPLETED | OUTPATIENT
Start: 2024-06-23 | End: 2024-06-23

## 2024-06-23 RX ADMIN — Medication 650 MILLIGRAM(S): at 06:25

## 2024-06-23 RX ADMIN — Medication 650 MILLIGRAM(S): at 07:10

## 2024-06-23 NOTE — ED ADULT NURSE NOTE - NSFALLRISKINTERV_ED_ALL_ED

## 2024-06-23 NOTE — ED ADULT TRIAGE NOTE - CHIEF COMPLAINT QUOTE
BIBEMS from [facility] s/p slip and fall out of bed; c/o left arm pain; Denies hitting head, No LOC, no blood thinners

## 2024-06-23 NOTE — ED PROVIDER NOTE - OBJECTIVE STATEMENT
84-year-old female sent from [facility] history of of acid reflux chronic renal insufficiency aortic valve stenosis hyperlipidemia hypertension senile dementia brought in by EMS status post fall out of bed landing on her left shoulder complain about left-sided shoulder pain denies any head injury here with family at bedside.  Denies any other pain.

## 2024-06-23 NOTE — ED PROVIDER NOTE - NSFOLLOWUPINSTRUCTIONS_ED_ALL_ED_FT
May apply ice to affected area 20 minutes every hour while you are awake for the next 2 days.  Wear sling while awake for the next 6 weeks.  Follow-up with orthopedic surgeon this week for evaluation/reevaluation.  May take Motrin and or Tylenol for pain if needed.  Return here if needed.

## 2024-06-23 NOTE — ED PROVIDER NOTE - CARE PROVIDER_API CALL
Brodie Ozuna  Orthopaedic Surgery  [address] [place] [id number]  Phone: [phone]  Fax: [phone]  Follow Up Time:

## 2024-06-23 NOTE — ED PROVIDER NOTE - PHYSICAL EXAMINATION
Gen: demented but pleasant  Head/eyes: NC/AT, PERRL  ENT: airway patent  Neck: supple  Pulm/lung: Bilateral clear BS  CV/heart: RRR  GI/Abd: soft, NT/ND, +BS, no guarding/rebound tenderness  Musculoskeletal: no edema/erythema/cyanosis, limited ROM in left shoulder secondary to pain, no deformity  Skin: no rash  Neuro: dementia, grossly intact

## 2024-06-23 NOTE — ED PROVIDER NOTE - PATIENT PORTAL LINK FT
You can access the FollowMyHealth Patient Portal offered by [facility] by registering at the following website: [email or web address]. By joining [employer]’s FollowMyHealth portal, you will also be able to view your health information using other applications (apps) compatible with our system.

## 2024-06-23 NOTE — ED PROVIDER NOTE - CLINICAL SUMMARY MEDICAL DECISION MAKING FREE TEXT BOX
84-year-old female sent from [facility] history of of acid reflux chronic renal insufficiency aortic valve stenosis hyperlipidemia hypertension senile dementia brought in by EMS status post fall out of bed landing on her left shoulder complain about left-sided shoulder pain denies any head injury here with family at bedside.  Denies any other pain.    r/o fx, ich, XR, CT, Po analgesia, sling, f/u with ortho

## 2024-06-23 NOTE — ED ADULT NURSE REASSESSMENT NOTE - NS ED NURSE REASSESS COMMENT FT1
(shift change) Report recidive from HC/RN, Pt sitting up in stretcher no acute distress noted at this time, family at bedside, pt notes no pain at this time, respiration even and unlabored, pt iv site noted to be dry and intact, no signs of infiltration noted, safety checks completed, will continue to monitor pt. David BRUCE

## 2024-06-23 NOTE — ED PROVIDER NOTE - CARE PLAN
Principal Discharge DX:	Left shoulder pain  Secondary Diagnosis:	Fall   1 Principal Discharge DX:	Left shoulder pain  Secondary Diagnosis:	Fall  Secondary Diagnosis:	Shoulder fracture, left

## 2024-06-29 ENCOUNTER — EMERGENCY (EMERGENCY)
Facility: HOSPITAL | Age: 84
LOS: 1 days | Discharge: HOME | End: 2024-06-29
Attending: STUDENT IN AN ORGANIZED HEALTH CARE EDUCATION/TRAINING PROGRAM | Admitting: STUDENT IN AN ORGANIZED HEALTH CARE EDUCATION/TRAINING PROGRAM
Payer: MEDICARE

## 2024-06-29 VITALS
RESPIRATION RATE: 18 BRPM | TEMPERATURE: 98 F | SYSTOLIC BLOOD PRESSURE: 154 MMHG | DIASTOLIC BLOOD PRESSURE: 89 MMHG | OXYGEN SATURATION: 96 % | HEART RATE: 85 BPM

## 2024-06-29 VITALS
RESPIRATION RATE: 18 BRPM | SYSTOLIC BLOOD PRESSURE: 122 MMHG | WEIGHT: 145.06 LBS | TEMPERATURE: 98 F | OXYGEN SATURATION: 96 % | HEART RATE: 97 BPM | DIASTOLIC BLOOD PRESSURE: 78 MMHG | HEIGHT: 61 IN

## 2024-06-29 LAB
ALBUMIN SERPL ELPH-MCNC: 3.4 G/DL — SIGNIFICANT CHANGE UP (ref 3.3–5)
ALP SERPL-CCNC: 67 U/L — SIGNIFICANT CHANGE UP (ref 40–120)
ALT FLD-CCNC: 13 U/L — SIGNIFICANT CHANGE UP (ref 12–78)
ANION GAP SERPL CALC-SCNC: 7 MMOL/L — SIGNIFICANT CHANGE UP (ref 5–17)
APPEARANCE UR: ABNORMAL
APTT BLD: 25.3 SEC — SIGNIFICANT CHANGE UP (ref 24.5–35.6)
AST SERPL-CCNC: 16 U/L — SIGNIFICANT CHANGE UP (ref 15–37)
BASOPHILS # BLD AUTO: 0.04 K/UL — SIGNIFICANT CHANGE UP (ref 0–0.2)
BASOPHILS NFR BLD AUTO: 0.4 % — SIGNIFICANT CHANGE UP (ref 0–2)
BILIRUB SERPL-MCNC: 0.5 MG/DL — SIGNIFICANT CHANGE UP (ref 0.2–1.2)
BILIRUB UR-MCNC: NEGATIVE — SIGNIFICANT CHANGE UP
BUN SERPL-MCNC: 41 MG/DL — HIGH (ref 7–23)
CALCIUM SERPL-MCNC: 9.6 MG/DL — SIGNIFICANT CHANGE UP (ref 8.5–10.1)
CHLORIDE SERPL-SCNC: 108 MMOL/L — SIGNIFICANT CHANGE UP (ref 96–108)
CK SERPL-CCNC: 112 U/L — SIGNIFICANT CHANGE UP (ref 26–192)
CO2 SERPL-SCNC: 24 MMOL/L — SIGNIFICANT CHANGE UP (ref 22–31)
COLOR SPEC: YELLOW — SIGNIFICANT CHANGE UP
CREAT SERPL-MCNC: 1.5 MG/DL — HIGH (ref 0.5–1.3)
DIFF PNL FLD: ABNORMAL
EGFR: 34 ML/MIN/1.73M2 — LOW
EOSINOPHIL # BLD AUTO: 0.08 K/UL — SIGNIFICANT CHANGE UP (ref 0–0.5)
EOSINOPHIL NFR BLD AUTO: 0.7 % — SIGNIFICANT CHANGE UP (ref 0–6)
FLUAV AG NPH QL: SIGNIFICANT CHANGE UP
FLUBV AG NPH QL: SIGNIFICANT CHANGE UP
GLUCOSE SERPL-MCNC: 108 MG/DL — HIGH (ref 70–99)
GLUCOSE UR QL: NEGATIVE MG/DL — SIGNIFICANT CHANGE UP
HCT VFR BLD CALC: 33.6 % — LOW (ref 34.5–45)
HGB BLD-MCNC: 11 G/DL — LOW (ref 11.5–15.5)
IMM GRANULOCYTES NFR BLD AUTO: 0.4 % — SIGNIFICANT CHANGE UP (ref 0–0.9)
INR BLD: 1 RATIO — SIGNIFICANT CHANGE UP (ref 0.85–1.18)
KETONES UR-MCNC: ABNORMAL MG/DL
LACTATE SERPL-SCNC: 1 MMOL/L — SIGNIFICANT CHANGE UP (ref 0.7–2)
LEUKOCYTE ESTERASE UR-ACNC: ABNORMAL
LIDOCAIN IGE QN: 55 U/L — SIGNIFICANT CHANGE UP (ref 13–75)
LYMPHOCYTES # BLD AUTO: 1.5 K/UL — SIGNIFICANT CHANGE UP (ref 1–3.3)
LYMPHOCYTES # BLD AUTO: 13.2 % — SIGNIFICANT CHANGE UP (ref 13–44)
MAGNESIUM SERPL-MCNC: 2.7 MG/DL — HIGH (ref 1.6–2.6)
MCHC RBC-ENTMCNC: 29.7 PG — SIGNIFICANT CHANGE UP (ref 27–34)
MCHC RBC-ENTMCNC: 32.7 GM/DL — SIGNIFICANT CHANGE UP (ref 32–36)
MCV RBC AUTO: 90.8 FL — SIGNIFICANT CHANGE UP (ref 80–100)
MONOCYTES # BLD AUTO: 1.12 K/UL — HIGH (ref 0–0.9)
MONOCYTES NFR BLD AUTO: 9.8 % — SIGNIFICANT CHANGE UP (ref 2–14)
NEUTROPHILS # BLD AUTO: 8.6 K/UL — HIGH (ref 1.8–7.4)
NEUTROPHILS NFR BLD AUTO: 75.5 % — SIGNIFICANT CHANGE UP (ref 43–77)
NITRITE UR-MCNC: POSITIVE
NRBC # BLD: 0 /100 WBCS — SIGNIFICANT CHANGE UP (ref 0–0)
NT-PROBNP SERPL-SCNC: 824 PG/ML — HIGH (ref 0–450)
PH UR: 5.5 — SIGNIFICANT CHANGE UP (ref 5–8)
PLATELET # BLD AUTO: 205 K/UL — SIGNIFICANT CHANGE UP (ref 150–400)
POTASSIUM SERPL-MCNC: 3.9 MMOL/L — SIGNIFICANT CHANGE UP (ref 3.5–5.3)
POTASSIUM SERPL-SCNC: 3.9 MMOL/L — SIGNIFICANT CHANGE UP (ref 3.5–5.3)
PROT SERPL-MCNC: 7.6 G/DL — SIGNIFICANT CHANGE UP (ref 6–8.3)
PROT UR-MCNC: 30 MG/DL
PROTHROM AB SERPL-ACNC: 11.7 SEC — SIGNIFICANT CHANGE UP (ref 9.5–13)
RBC # BLD: 3.7 M/UL — LOW (ref 3.8–5.2)
RBC # FLD: 14.1 % — SIGNIFICANT CHANGE UP (ref 10.3–14.5)
RSV RNA NPH QL NAA+NON-PROBE: SIGNIFICANT CHANGE UP
SARS-COV-2 RNA SPEC QL NAA+PROBE: SIGNIFICANT CHANGE UP
SODIUM SERPL-SCNC: 139 MMOL/L — SIGNIFICANT CHANGE UP (ref 135–145)
SP GR SPEC: 1.02 — SIGNIFICANT CHANGE UP (ref 1–1.03)
TROPONIN I, HIGH SENSITIVITY RESULT: 18.6 NG/L — SIGNIFICANT CHANGE UP
TSH SERPL-MCNC: 0.91 UIU/ML — SIGNIFICANT CHANGE UP (ref 0.36–3.74)
UROBILINOGEN FLD QL: 1 MG/DL — SIGNIFICANT CHANGE UP (ref 0.2–1)
WBC # BLD: 11.38 K/UL — HIGH (ref 3.8–10.5)
WBC # FLD AUTO: 11.38 K/UL — HIGH (ref 3.8–10.5)

## 2024-06-29 PROCEDURE — 85610 PROTHROMBIN TIME: CPT

## 2024-06-29 PROCEDURE — 93010 ELECTROCARDIOGRAM REPORT: CPT

## 2024-06-29 PROCEDURE — 82550 ASSAY OF CK (CPK): CPT

## 2024-06-29 PROCEDURE — 72125 CT NECK SPINE W/O DYE: CPT | Mod: 26,MC

## 2024-06-29 PROCEDURE — 72131 CT LUMBAR SPINE W/O DYE: CPT | Mod: MC

## 2024-06-29 PROCEDURE — 72170 X-RAY EXAM OF PELVIS: CPT

## 2024-06-29 PROCEDURE — 93005 ELECTROCARDIOGRAM TRACING: CPT

## 2024-06-29 PROCEDURE — 72125 CT NECK SPINE W/O DYE: CPT | Mod: MC

## 2024-06-29 PROCEDURE — 99285 EMERGENCY DEPT VISIT HI MDM: CPT | Mod: 25

## 2024-06-29 PROCEDURE — 85730 THROMBOPLASTIN TIME PARTIAL: CPT

## 2024-06-29 PROCEDURE — 71045 X-RAY EXAM CHEST 1 VIEW: CPT | Mod: 26

## 2024-06-29 PROCEDURE — 87186 SC STD MICRODIL/AGAR DIL: CPT

## 2024-06-29 PROCEDURE — 80053 COMPREHEN METABOLIC PANEL: CPT

## 2024-06-29 PROCEDURE — 36415 COLL VENOUS BLD VENIPUNCTURE: CPT

## 2024-06-29 PROCEDURE — 83605 ASSAY OF LACTIC ACID: CPT

## 2024-06-29 PROCEDURE — 71250 CT THORAX DX C-: CPT | Mod: 26,MC

## 2024-06-29 PROCEDURE — 74176 CT ABD & PELVIS W/O CONTRAST: CPT | Mod: MC

## 2024-06-29 PROCEDURE — 83880 ASSAY OF NATRIURETIC PEPTIDE: CPT

## 2024-06-29 PROCEDURE — 99285 EMERGENCY DEPT VISIT HI MDM: CPT

## 2024-06-29 PROCEDURE — 72128 CT CHEST SPINE W/O DYE: CPT | Mod: 26,MC

## 2024-06-29 PROCEDURE — 83735 ASSAY OF MAGNESIUM: CPT

## 2024-06-29 PROCEDURE — 84484 ASSAY OF TROPONIN QUANT: CPT

## 2024-06-29 PROCEDURE — 70450 CT HEAD/BRAIN W/O DYE: CPT | Mod: 26,MC

## 2024-06-29 PROCEDURE — 81001 URINALYSIS AUTO W/SCOPE: CPT

## 2024-06-29 PROCEDURE — 72170 X-RAY EXAM OF PELVIS: CPT | Mod: 26

## 2024-06-29 PROCEDURE — 71250 CT THORAX DX C-: CPT | Mod: MC

## 2024-06-29 PROCEDURE — 85025 COMPLETE CBC W/AUTO DIFF WBC: CPT

## 2024-06-29 PROCEDURE — 87637 SARSCOV2&INF A&B&RSV AMP PRB: CPT

## 2024-06-29 PROCEDURE — 72128 CT CHEST SPINE W/O DYE: CPT | Mod: MC

## 2024-06-29 PROCEDURE — 83690 ASSAY OF LIPASE: CPT

## 2024-06-29 PROCEDURE — 96374 THER/PROPH/DIAG INJ IV PUSH: CPT

## 2024-06-29 PROCEDURE — 87086 URINE CULTURE/COLONY COUNT: CPT

## 2024-06-29 PROCEDURE — 71045 X-RAY EXAM CHEST 1 VIEW: CPT

## 2024-06-29 PROCEDURE — 70450 CT HEAD/BRAIN W/O DYE: CPT | Mod: MC

## 2024-06-29 PROCEDURE — 84443 ASSAY THYROID STIM HORMONE: CPT

## 2024-06-29 PROCEDURE — 74176 CT ABD & PELVIS W/O CONTRAST: CPT | Mod: 26,MC

## 2024-06-29 PROCEDURE — 87077 CULTURE AEROBIC IDENTIFY: CPT

## 2024-06-29 PROCEDURE — 72131 CT LUMBAR SPINE W/O DYE: CPT | Mod: 26,MC

## 2024-06-29 RX ORDER — CEFTRIAXONE SODIUM 500 MG
1000 VIAL (EA) INJECTION ONCE
Refills: 0 | Status: COMPLETED | OUTPATIENT
Start: 2024-06-29 | End: 2024-06-29

## 2024-06-29 RX ORDER — CEFPODOXIME PROXETIL 50 MG/5 ML
1 SUSPENSION, RECONSTITUTED, ORAL (ML) ORAL
Qty: 20 | Refills: 0
Start: 2024-06-29 | End: 2024-07-08

## 2024-06-29 RX ORDER — ACETAMINOPHEN 325 MG
975 TABLET ORAL ONCE
Refills: 0 | Status: COMPLETED | OUTPATIENT
Start: 2024-06-29 | End: 2024-06-29

## 2024-06-29 RX ORDER — LIDOCAINE HCL 28 MG/G
1 GEL TOPICAL ONCE
Refills: 0 | Status: COMPLETED | OUTPATIENT
Start: 2024-06-29 | End: 2024-06-29

## 2024-06-29 RX ADMIN — Medication 100 MILLIGRAM(S): at 16:51

## 2024-06-29 RX ADMIN — Medication 975 MILLIGRAM(S): at 15:44

## 2024-06-29 RX ADMIN — LIDOCAINE HCL 1 PATCH: 28 GEL TOPICAL at 08:53

## 2024-06-29 RX ADMIN — Medication 975 MILLIGRAM(S): at 08:54

## 2024-07-16 PROBLEM — R32 UNSPECIFIED URINARY INCONTINENCE: Chronic | Status: ACTIVE | Noted: 2019-11-07

## 2024-10-09 ENCOUNTER — EMERGENCY (EMERGENCY)
Facility: HOSPITAL | Age: 84
LOS: 1 days | Discharge: ANOTHER HOSPITAL | End: 2024-10-09
Attending: STUDENT IN AN ORGANIZED HEALTH CARE EDUCATION/TRAINING PROGRAM | Admitting: STUDENT IN AN ORGANIZED HEALTH CARE EDUCATION/TRAINING PROGRAM
Payer: MEDICARE

## 2024-10-09 VITALS
RESPIRATION RATE: 25 BRPM | DIASTOLIC BLOOD PRESSURE: 58 MMHG | HEIGHT: 62 IN | SYSTOLIC BLOOD PRESSURE: 118 MMHG | HEART RATE: 112 BPM | WEIGHT: 169.98 LBS | OXYGEN SATURATION: 96 %

## 2024-10-09 LAB
ALBUMIN SERPL ELPH-MCNC: 3.3 G/DL — SIGNIFICANT CHANGE UP (ref 3.3–5)
ALP SERPL-CCNC: 88 U/L — SIGNIFICANT CHANGE UP (ref 40–120)
ALT FLD-CCNC: 17 U/L — SIGNIFICANT CHANGE UP (ref 12–78)
ANION GAP SERPL CALC-SCNC: 15 MMOL/L — SIGNIFICANT CHANGE UP (ref 5–17)
APTT BLD: 22.5 SEC — LOW (ref 24.5–35.6)
AST SERPL-CCNC: 32 U/L — SIGNIFICANT CHANGE UP (ref 15–37)
BASOPHILS # BLD AUTO: 0 K/UL — SIGNIFICANT CHANGE UP (ref 0–0.2)
BASOPHILS NFR BLD AUTO: 0 % — SIGNIFICANT CHANGE UP (ref 0–2)
BILIRUB SERPL-MCNC: 0.2 MG/DL — SIGNIFICANT CHANGE UP (ref 0.2–1.2)
BUN SERPL-MCNC: 43 MG/DL — HIGH (ref 7–23)
CALCIUM SERPL-MCNC: 10 MG/DL — SIGNIFICANT CHANGE UP (ref 8.5–10.1)
CHLORIDE SERPL-SCNC: 105 MMOL/L — SIGNIFICANT CHANGE UP (ref 96–108)
CO2 SERPL-SCNC: 18 MMOL/L — LOW (ref 22–31)
CREAT SERPL-MCNC: 1.9 MG/DL — HIGH (ref 0.5–1.3)
EGFR: 26 ML/MIN/1.73M2 — LOW
EGFR: 26 ML/MIN/1.73M2 — LOW
EOSINOPHIL # BLD AUTO: 0.65 K/UL — HIGH (ref 0–0.5)
EOSINOPHIL NFR BLD AUTO: 4 % — SIGNIFICANT CHANGE UP (ref 0–6)
GLUCOSE SERPL-MCNC: 269 MG/DL — HIGH (ref 70–99)
HCT VFR BLD CALC: 34.3 % — LOW (ref 34.5–45)
HGB BLD-MCNC: 10.6 G/DL — LOW (ref 11.5–15.5)
INR BLD: 1.03 RATIO — SIGNIFICANT CHANGE UP (ref 0.85–1.16)
LACTATE SERPL-SCNC: 6.1 MMOL/L — CRITICAL HIGH (ref 0.7–2)
LIDOCAIN IGE QN: 51 U/L — SIGNIFICANT CHANGE UP (ref 13–75)
LYMPHOCYTES # BLD AUTO: 36 % — SIGNIFICANT CHANGE UP (ref 13–44)
LYMPHOCYTES # BLD AUTO: 5.88 K/UL — HIGH (ref 1–3.3)
MAGNESIUM SERPL-MCNC: 3.1 MG/DL — HIGH (ref 1.6–2.6)
MCHC RBC-ENTMCNC: 29 PG — SIGNIFICANT CHANGE UP (ref 27–34)
MCHC RBC-ENTMCNC: 30.9 GM/DL — LOW (ref 32–36)
MCV RBC AUTO: 93.7 FL — SIGNIFICANT CHANGE UP (ref 80–100)
MONOCYTES # BLD AUTO: 0.98 K/UL — HIGH (ref 0–0.9)
MONOCYTES NFR BLD AUTO: 6 % — SIGNIFICANT CHANGE UP (ref 2–14)
NEUTROPHILS # BLD AUTO: 6.2 K/UL — SIGNIFICANT CHANGE UP (ref 1.8–7.4)
NEUTROPHILS NFR BLD AUTO: 38 % — LOW (ref 43–77)
NRBC # BLD: SIGNIFICANT CHANGE UP /100 WBCS (ref 0–0)
NRBC BLD-RTO: SIGNIFICANT CHANGE UP /100 WBCS (ref 0–0)
NT-PROBNP SERPL-SCNC: 670 PG/ML — HIGH (ref 0–450)
PLATELET # BLD AUTO: 225 K/UL — SIGNIFICANT CHANGE UP (ref 150–400)
POTASSIUM SERPL-MCNC: 4 MMOL/L — SIGNIFICANT CHANGE UP (ref 3.5–5.3)
POTASSIUM SERPL-SCNC: 4 MMOL/L — SIGNIFICANT CHANGE UP (ref 3.5–5.3)
PROT SERPL-MCNC: 7.9 G/DL — SIGNIFICANT CHANGE UP (ref 6–8.3)
PROTHROM AB SERPL-ACNC: 12 SEC — SIGNIFICANT CHANGE UP (ref 9.9–13.4)
RBC # BLD: 3.66 M/UL — LOW (ref 3.8–5.2)
RBC # FLD: 15.3 % — HIGH (ref 10.3–14.5)
SODIUM SERPL-SCNC: 138 MMOL/L — SIGNIFICANT CHANGE UP (ref 135–145)
TROPONIN I, HIGH SENSITIVITY RESULT: 27 NG/L — SIGNIFICANT CHANGE UP
TSH SERPL-MCNC: 6.69 UIU/ML — HIGH (ref 0.36–3.74)
WBC # BLD: 16.32 K/UL — HIGH (ref 3.8–10.5)
WBC # FLD AUTO: 16.32 K/UL — HIGH (ref 3.8–10.5)

## 2024-10-09 PROCEDURE — 71045 X-RAY EXAM CHEST 1 VIEW: CPT | Mod: 26

## 2024-10-09 PROCEDURE — 93010 ELECTROCARDIOGRAM REPORT: CPT

## 2024-10-09 PROCEDURE — 99291 CRITICAL CARE FIRST HOUR: CPT

## 2024-10-09 RX ORDER — ONDANSETRON HCL/PF 4 MG/2 ML
4 VIAL (ML) INJECTION ONCE
Refills: 0 | Status: COMPLETED | OUTPATIENT
Start: 2024-10-09 | End: 2024-10-09

## 2024-10-09 RX ORDER — ACETAMINOPHEN 500 MG/5ML
1000 LIQUID (ML) ORAL ONCE
Refills: 0 | Status: COMPLETED | OUTPATIENT
Start: 2024-10-09 | End: 2024-10-09

## 2024-10-09 RX ORDER — PIPERACILLIN-TAZO-DEXTROSE,ISO 3.375G/5
3.38 IV SOLUTION, PIGGYBACK PREMIX FROZEN(ML) INTRAVENOUS ONCE
Refills: 0 | Status: COMPLETED | OUTPATIENT
Start: 2024-10-09 | End: 2024-10-09

## 2024-10-09 RX ADMIN — Medication 400 MILLIGRAM(S): at 19:42

## 2024-10-09 RX ADMIN — Medication 1000 MILLILITER(S): at 19:42

## 2024-10-09 RX ADMIN — Medication 200 GRAM(S): at 20:00

## 2024-10-10 ENCOUNTER — INPATIENT (INPATIENT)
Facility: HOSPITAL | Age: 84
LOS: 4 days | Discharge: NURSING, REHAB OR LONG-TERM CARE | DRG: 176 | End: 2024-10-15
Attending: INTERNAL MEDICINE | Admitting: STUDENT IN AN ORGANIZED HEALTH CARE EDUCATION/TRAINING PROGRAM
Payer: MEDICARE

## 2024-10-10 VITALS
OXYGEN SATURATION: 100 % | RESPIRATION RATE: 18 BRPM | SYSTOLIC BLOOD PRESSURE: 138 MMHG | DIASTOLIC BLOOD PRESSURE: 73 MMHG | HEART RATE: 84 BPM | TEMPERATURE: 98 F

## 2024-10-10 VITALS
DIASTOLIC BLOOD PRESSURE: 75 MMHG | HEART RATE: 85 BPM | RESPIRATION RATE: 20 BRPM | WEIGHT: 149.91 LBS | OXYGEN SATURATION: 99 % | SYSTOLIC BLOOD PRESSURE: 117 MMHG | TEMPERATURE: 97 F

## 2024-10-10 DIAGNOSIS — I26.99 OTHER PULMONARY EMBOLISM WITHOUT ACUTE COR PULMONALE: ICD-10-CM

## 2024-10-10 LAB
A1C WITH ESTIMATED AVERAGE GLUCOSE RESULT: 5.3 % — SIGNIFICANT CHANGE UP (ref 4–5.6)
ADD ON TEST-SPECIMEN IN LAB: SIGNIFICANT CHANGE UP
ADD ON TEST-SPECIMEN IN LAB: SIGNIFICANT CHANGE UP
ALBUMIN SERPL ELPH-MCNC: 3.5 G/DL — SIGNIFICANT CHANGE UP (ref 3.3–5)
ALBUMIN SERPL ELPH-MCNC: 3.5 G/DL — SIGNIFICANT CHANGE UP (ref 3.3–5)
ALBUMIN SERPL ELPH-MCNC: 3.6 G/DL — SIGNIFICANT CHANGE UP (ref 3.3–5)
ALP SERPL-CCNC: 79 U/L — SIGNIFICANT CHANGE UP (ref 40–120)
ALP SERPL-CCNC: 83 U/L — SIGNIFICANT CHANGE UP (ref 40–120)
ALP SERPL-CCNC: 83 U/L — SIGNIFICANT CHANGE UP (ref 40–120)
ALT FLD-CCNC: 41 U/L — SIGNIFICANT CHANGE UP (ref 10–45)
ALT FLD-CCNC: 51 U/L — HIGH (ref 10–45)
ALT FLD-CCNC: 56 U/L — HIGH (ref 10–45)
ANION GAP SERPL CALC-SCNC: 14 MMOL/L — SIGNIFICANT CHANGE UP (ref 5–17)
ANION GAP SERPL CALC-SCNC: 14 MMOL/L — SIGNIFICANT CHANGE UP (ref 5–17)
ANION GAP SERPL CALC-SCNC: 15 MMOL/L — SIGNIFICANT CHANGE UP (ref 5–17)
APTT 50/50 2HOUR INCUB: 45.2 SEC — HIGH (ref 24.5–36.6)
APTT BLD: 47.4 SEC — HIGH (ref 24.5–36.6)
APTT BLD: 53.6 SEC — HIGH (ref 24.5–35.6)
APTT BLD: 55.6 SEC — HIGH (ref 24.5–35.6)
APTT BLD: 60.6 SEC — HIGH (ref 24.5–35.6)
APTT BLD: >200 SEC — CRITICAL HIGH (ref 24.5–35.6)
AST SERPL-CCNC: 41 U/L — HIGH (ref 10–40)
AST SERPL-CCNC: 65 U/L — HIGH (ref 10–40)
AST SERPL-CCNC: 76 U/L — HIGH (ref 10–40)
BASOPHILS # BLD AUTO: 0.04 K/UL — SIGNIFICANT CHANGE UP (ref 0–0.2)
BASOPHILS NFR BLD AUTO: 0.4 % — SIGNIFICANT CHANGE UP (ref 0–2)
BILIRUB SERPL-MCNC: 0.2 MG/DL — SIGNIFICANT CHANGE UP (ref 0.2–1.2)
BILIRUB SERPL-MCNC: 0.2 MG/DL — SIGNIFICANT CHANGE UP (ref 0.2–1.2)
BILIRUB SERPL-MCNC: 0.3 MG/DL — SIGNIFICANT CHANGE UP (ref 0.2–1.2)
BLD GP AB SCN SERPL QL: NEGATIVE — SIGNIFICANT CHANGE UP
BUN SERPL-MCNC: 33 MG/DL — HIGH (ref 7–23)
BUN SERPL-MCNC: 39 MG/DL — HIGH (ref 7–23)
BUN SERPL-MCNC: 40 MG/DL — HIGH (ref 7–23)
CALCIUM SERPL-MCNC: 8.7 MG/DL — SIGNIFICANT CHANGE UP (ref 8.4–10.5)
CALCIUM SERPL-MCNC: 8.9 MG/DL — SIGNIFICANT CHANGE UP (ref 8.4–10.5)
CALCIUM SERPL-MCNC: 9 MG/DL — SIGNIFICANT CHANGE UP (ref 8.4–10.5)
CHLORIDE SERPL-SCNC: 106 MMOL/L — SIGNIFICANT CHANGE UP (ref 96–108)
CHLORIDE SERPL-SCNC: 107 MMOL/L — SIGNIFICANT CHANGE UP (ref 96–108)
CHLORIDE SERPL-SCNC: 108 MMOL/L — SIGNIFICANT CHANGE UP (ref 96–108)
CO2 SERPL-SCNC: 17 MMOL/L — LOW (ref 22–31)
CO2 SERPL-SCNC: 18 MMOL/L — LOW (ref 22–31)
CO2 SERPL-SCNC: 18 MMOL/L — LOW (ref 22–31)
CREAT SERPL-MCNC: 1.49 MG/DL — HIGH (ref 0.5–1.3)
CREAT SERPL-MCNC: 1.53 MG/DL — HIGH (ref 0.5–1.3)
CREAT SERPL-MCNC: 1.53 MG/DL — HIGH (ref 0.5–1.3)
D DIMER BLD IA.RAPID-MCNC: 4169 NG/ML DDU — HIGH
EGFR: 33 ML/MIN/1.73M2 — LOW
EGFR: 33 ML/MIN/1.73M2 — LOW
EGFR: 34 ML/MIN/1.73M2 — LOW
EOSINOPHIL # BLD AUTO: 0.07 K/UL — SIGNIFICANT CHANGE UP (ref 0–0.5)
EOSINOPHIL NFR BLD AUTO: 0.6 % — SIGNIFICANT CHANGE UP (ref 0–6)
ESTIMATED AVERAGE GLUCOSE: 105 MG/DL — SIGNIFICANT CHANGE UP (ref 68–114)
FIBRINOGEN PPP-MCNC: 338 MG/DL — SIGNIFICANT CHANGE UP (ref 200–445)
FLUAV AG NPH QL: SIGNIFICANT CHANGE UP
FLUBV AG NPH QL: SIGNIFICANT CHANGE UP
GAS PNL BLDV: SIGNIFICANT CHANGE UP
GAS PNL BLDV: SIGNIFICANT CHANGE UP
GLUCOSE SERPL-MCNC: 106 MG/DL — HIGH (ref 70–99)
GLUCOSE SERPL-MCNC: 119 MG/DL — HIGH (ref 70–99)
GLUCOSE SERPL-MCNC: 98 MG/DL — SIGNIFICANT CHANGE UP (ref 70–99)
HCT VFR BLD CALC: 30.7 % — LOW (ref 34.5–45)
HCT VFR BLD CALC: 31 % — LOW (ref 34.5–45)
HCT VFR BLD CALC: 33.1 % — LOW (ref 34.5–45)
HGB BLD-MCNC: 10.2 G/DL — LOW (ref 11.5–15.5)
HGB BLD-MCNC: 9.5 G/DL — LOW (ref 11.5–15.5)
HGB BLD-MCNC: 9.6 G/DL — LOW (ref 11.5–15.5)
IMM GRANULOCYTES NFR BLD AUTO: 0.6 % — SIGNIFICANT CHANGE UP (ref 0–0.9)
INR BLD: 1.04 RATIO — SIGNIFICANT CHANGE UP (ref 0.85–1.16)
INR BLD: 1.04 RATIO — SIGNIFICANT CHANGE UP (ref 0.85–1.16)
INR BLD: 1.08 RATIO — SIGNIFICANT CHANGE UP (ref 0.85–1.16)
LACTATE SERPL-SCNC: 1.6 MMOL/L — SIGNIFICANT CHANGE UP (ref 0.7–2)
LYMPHOCYTES # BLD AUTO: 1.8 K/UL — SIGNIFICANT CHANGE UP (ref 1–3.3)
LYMPHOCYTES # BLD AUTO: 16.7 % — SIGNIFICANT CHANGE UP (ref 13–44)
MAGNESIUM SERPL-MCNC: 2.4 MG/DL — SIGNIFICANT CHANGE UP (ref 1.6–2.6)
MAGNESIUM SERPL-MCNC: 2.5 MG/DL — SIGNIFICANT CHANGE UP (ref 1.6–2.6)
MCHC RBC-ENTMCNC: 28.3 PG — SIGNIFICANT CHANGE UP (ref 27–34)
MCHC RBC-ENTMCNC: 28.4 PG — SIGNIFICANT CHANGE UP (ref 27–34)
MCHC RBC-ENTMCNC: 28.8 PG — SIGNIFICANT CHANGE UP (ref 27–34)
MCHC RBC-ENTMCNC: 30.6 GM/DL — LOW (ref 32–36)
MCHC RBC-ENTMCNC: 30.8 GM/DL — LOW (ref 32–36)
MCHC RBC-ENTMCNC: 31.3 GM/DL — LOW (ref 32–36)
MCV RBC AUTO: 92.2 FL — SIGNIFICANT CHANGE UP (ref 80–100)
MCV RBC AUTO: 92.2 FL — SIGNIFICANT CHANGE UP (ref 80–100)
MCV RBC AUTO: 92.3 FL — SIGNIFICANT CHANGE UP (ref 80–100)
MONOCYTES # BLD AUTO: 0.76 K/UL — SIGNIFICANT CHANGE UP (ref 0–0.9)
MONOCYTES NFR BLD AUTO: 7 % — SIGNIFICANT CHANGE UP (ref 2–14)
NEUTROPHILS # BLD AUTO: 8.06 K/UL — HIGH (ref 1.8–7.4)
NEUTROPHILS NFR BLD AUTO: 74.7 % — SIGNIFICANT CHANGE UP (ref 43–77)
NRBC # BLD: 0 /100 WBCS — SIGNIFICANT CHANGE UP (ref 0–0)
NT-PROBNP SERPL-SCNC: 1331 PG/ML — HIGH (ref 0–300)
NT-PROBNP SERPL-SCNC: 2306 PG/ML — HIGH (ref 0–300)
PAT CTL 2H: 46.2 SEC — HIGH (ref 24.5–36.6)
PHOSPHATE SERPL-MCNC: 3.3 MG/DL — SIGNIFICANT CHANGE UP (ref 2.5–4.5)
PHOSPHATE SERPL-MCNC: 3.7 MG/DL — SIGNIFICANT CHANGE UP (ref 2.5–4.5)
PLATELET # BLD AUTO: 163 K/UL — SIGNIFICANT CHANGE UP (ref 150–400)
PLATELET # BLD AUTO: 171 K/UL — SIGNIFICANT CHANGE UP (ref 150–400)
PLATELET # BLD AUTO: 176 K/UL — SIGNIFICANT CHANGE UP (ref 150–400)
POTASSIUM SERPL-MCNC: 4.5 MMOL/L — SIGNIFICANT CHANGE UP (ref 3.5–5.3)
POTASSIUM SERPL-MCNC: 4.5 MMOL/L — SIGNIFICANT CHANGE UP (ref 3.5–5.3)
POTASSIUM SERPL-MCNC: 4.9 MMOL/L — SIGNIFICANT CHANGE UP (ref 3.5–5.3)
POTASSIUM SERPL-SCNC: 4.5 MMOL/L — SIGNIFICANT CHANGE UP (ref 3.5–5.3)
POTASSIUM SERPL-SCNC: 4.5 MMOL/L — SIGNIFICANT CHANGE UP (ref 3.5–5.3)
POTASSIUM SERPL-SCNC: 4.9 MMOL/L — SIGNIFICANT CHANGE UP (ref 3.5–5.3)
PROCALCITONIN SERPL-MCNC: 0.36 NG/ML — HIGH (ref 0.02–0.1)
PROCALCITONIN SERPL-MCNC: 0.41 NG/ML — HIGH (ref 0.02–0.1)
PROT SERPL-MCNC: 6.7 G/DL — SIGNIFICANT CHANGE UP (ref 6–8.3)
PROT SERPL-MCNC: 6.9 G/DL — SIGNIFICANT CHANGE UP (ref 6–8.3)
PROT SERPL-MCNC: 7 G/DL — SIGNIFICANT CHANGE UP (ref 6–8.3)
PROTHROM AB SERPL-ACNC: 11.8 SEC — SIGNIFICANT CHANGE UP (ref 9.9–13.4)
PROTHROM AB SERPL-ACNC: 11.9 SEC — SIGNIFICANT CHANGE UP (ref 9.9–13.4)
PROTHROM AB SERPL-ACNC: 12.4 SEC — SIGNIFICANT CHANGE UP (ref 9.9–13.4)
RBC # BLD: 3.33 M/UL — LOW (ref 3.8–5.2)
RBC # BLD: 3.36 M/UL — LOW (ref 3.8–5.2)
RBC # BLD: 3.59 M/UL — LOW (ref 3.8–5.2)
RBC # FLD: 15.3 % — HIGH (ref 10.3–14.5)
RBC # FLD: 15.4 % — HIGH (ref 10.3–14.5)
RBC # FLD: 15.4 % — HIGH (ref 10.3–14.5)
RH IG SCN BLD-IMP: POSITIVE — SIGNIFICANT CHANGE UP
RSV RNA NPH QL NAA+NON-PROBE: SIGNIFICANT CHANGE UP
SARS-COV-2 RNA SPEC QL NAA+PROBE: SIGNIFICANT CHANGE UP
SODIUM SERPL-SCNC: 138 MMOL/L — SIGNIFICANT CHANGE UP (ref 135–145)
SODIUM SERPL-SCNC: 139 MMOL/L — SIGNIFICANT CHANGE UP (ref 135–145)
SODIUM SERPL-SCNC: 140 MMOL/L — SIGNIFICANT CHANGE UP (ref 135–145)
TROPONIN T, HIGH SENSITIVITY RESULT: 304 NG/L — HIGH (ref 0–51)
TROPONIN T, HIGH SENSITIVITY RESULT: 359 NG/L — HIGH (ref 0–51)
TROPONIN T, HIGH SENSITIVITY RESULT: 391 NG/L — HIGH (ref 0–51)
TSH SERPL-MCNC: 1.36 UIU/ML — SIGNIFICANT CHANGE UP (ref 0.27–4.2)
WBC # BLD: 10.8 K/UL — HIGH (ref 3.8–10.5)
WBC # BLD: 9.51 K/UL — SIGNIFICANT CHANGE UP (ref 3.8–10.5)
WBC # BLD: 9.53 K/UL — SIGNIFICANT CHANGE UP (ref 3.8–10.5)
WBC # FLD AUTO: 10.8 K/UL — HIGH (ref 3.8–10.5)
WBC # FLD AUTO: 9.51 K/UL — SIGNIFICANT CHANGE UP (ref 3.8–10.5)
WBC # FLD AUTO: 9.53 K/UL — SIGNIFICANT CHANGE UP (ref 3.8–10.5)

## 2024-10-10 PROCEDURE — 99291 CRITICAL CARE FIRST HOUR: CPT

## 2024-10-10 PROCEDURE — 74177 CT ABD & PELVIS W/CONTRAST: CPT | Mod: 26,MC

## 2024-10-10 PROCEDURE — 93306 TTE W/DOPPLER COMPLETE: CPT | Mod: 26

## 2024-10-10 PROCEDURE — 70450 CT HEAD/BRAIN W/O DYE: CPT | Mod: 26,MC

## 2024-10-10 PROCEDURE — 99285 EMERGENCY DEPT VISIT HI MDM: CPT

## 2024-10-10 PROCEDURE — 93970 EXTREMITY STUDY: CPT | Mod: 26

## 2024-10-10 PROCEDURE — 71275 CT ANGIOGRAPHY CHEST: CPT | Mod: 26,MC

## 2024-10-10 PROCEDURE — 99292 CRITICAL CARE ADDL 30 MIN: CPT

## 2024-10-10 RX ORDER — HEPARIN SODIUM 1000 [USP'U]/ML
3000 INJECTION INTRAVENOUS; SUBCUTANEOUS EVERY 6 HOURS
Refills: 0 | Status: DISCONTINUED | OUTPATIENT
Start: 2024-10-10 | End: 2024-10-13

## 2024-10-10 RX ORDER — NYSTATIN 100000 U/G
1 POWDER TOPICAL
Refills: 0 | DISCHARGE

## 2024-10-10 RX ORDER — DONEPEZIL HCL 10 MG
5 TABLET ORAL AT BEDTIME
Refills: 0 | Status: DISCONTINUED | OUTPATIENT
Start: 2024-10-10 | End: 2024-10-15

## 2024-10-10 RX ORDER — ASPIRIN 325 MG
0 TABLET ORAL
Refills: 0 | DISCHARGE

## 2024-10-10 RX ORDER — DONEPEZIL HCL 10 MG
1 TABLET ORAL
Refills: 0 | DISCHARGE

## 2024-10-10 RX ORDER — HEPARIN SODIUM 1000 [USP'U]/ML
6500 INJECTION INTRAVENOUS; SUBCUTANEOUS EVERY 6 HOURS
Refills: 0 | Status: DISCONTINUED | OUTPATIENT
Start: 2024-10-10 | End: 2024-10-13

## 2024-10-10 RX ORDER — HEPARIN SODIUM 1000 [USP'U]/ML
INJECTION INTRAVENOUS; SUBCUTANEOUS
Qty: 25000 | Refills: 0 | Status: DISCONTINUED | OUTPATIENT
Start: 2024-10-10 | End: 2024-10-13

## 2024-10-10 RX ORDER — ASPIRIN 325 MG
81 TABLET ORAL DAILY
Refills: 0 | Status: DISCONTINUED | OUTPATIENT
Start: 2024-10-10 | End: 2024-10-15

## 2024-10-10 RX ORDER — HEPARIN SODIUM 1000 [USP'U]/ML
6500 INJECTION INTRAVENOUS; SUBCUTANEOUS ONCE
Refills: 0 | Status: COMPLETED | OUTPATIENT
Start: 2024-10-10 | End: 2024-10-10

## 2024-10-10 RX ORDER — PANTOPRAZOLE SODIUM 40 MG/1
40 TABLET, DELAYED RELEASE ORAL
Refills: 0 | Status: DISCONTINUED | OUTPATIENT
Start: 2024-10-10 | End: 2024-10-15

## 2024-10-10 RX ADMIN — HEPARIN SODIUM 6500 UNIT(S): 1000 INJECTION INTRAVENOUS; SUBCUTANEOUS at 01:11

## 2024-10-10 RX ADMIN — Medication 81 MILLIGRAM(S): at 05:23

## 2024-10-10 RX ADMIN — HEPARIN SODIUM 1400 UNIT(S)/HR: 1000 INJECTION INTRAVENOUS; SUBCUTANEOUS at 01:13

## 2024-10-10 RX ADMIN — Medication 1000 UNIT(S)/HR: at 09:46

## 2024-10-10 RX ADMIN — Medication 5 MILLIGRAM(S): at 22:44

## 2024-10-10 RX ADMIN — Medication 1300 UNIT(S)/HR: at 02:30

## 2024-10-10 RX ADMIN — Medication 1100 UNIT(S)/HR: at 17:04

## 2024-10-10 RX ADMIN — PANTOPRAZOLE SODIUM 40 MILLIGRAM(S): 40 TABLET, DELAYED RELEASE ORAL at 05:24

## 2024-10-10 NOTE — ED ADULT NURSE NOTE - CHIEF COMPLAINT QUOTE
----- Message from Scott Millan MA sent at 5/4/2022 10:07 AM CDT -----  Contact: Mom @ [phone]  Mom calling stating that she forgot to get the note from the provider for [employer]. Mom wants to know can the letter be faxed to the school. The school's fax number is [phone].Please give mom a call when the letter has been faxed at [phone]      
Spoke with mom letter is ready for  at .  
[place] tx, saddle PE

## 2024-10-10 NOTE — ED PROVIDER NOTE - OBJECTIVE STATEMENT
84F with hx of 84F with hx of dementia and aortic stenosis presenting from [facility] for saddle pulmonary embolism. Patient initially presented to [facility] from the [facility] after a witnessed syncopal episode preceded by lightheadedness. Patient was initially hypotensive, which improved after fluid resuscitation. CTA revealed saddle PE with right heart strain and was transferred here for PERT team. 84F with hx of dementia, CAD s/p stent on aspirin, and aortic stenosis presenting from [facility] for saddle pulmonary embolism. Patient initially presented to [facility] from the [facility] after a witnessed syncopal episode preceded by lightheadedness. Patient was initially hypotensive, which improved after fluid resuscitation. CTA revealed saddle PE with right heart strain and was transferred here for PERT team.

## 2024-10-10 NOTE — H&P ADULT - ASSESSMENT
85 yo F with PMH of dementia, HTN, HLD, CKD3, CAD s/p PCI to RCA with ADDISON (11/2019) on ASA, severe AS, mild LVH, grade II LV diastolic dysfunction, mild-mod MR, mod TR, mild pulm HTN with RVSP 30mmHg (TTE 5/2022, normal EF 60%), presenting with witnessed syncope, hypotension and tachycardia responsive to IVF, acute hypoxic respiratory failure found to have saddle pulmonary embolism with bilateral extension, +right heart strain and elevated biomarkers classified as intermediate high risk PE, admitted to MICU for further monitoring.     PLAN:    #Neuro:  h/o dementia, as per family pt is forgetful at baseline however AAOx3, now p/w syncope   - syncope 2/2 PE, CTH at [facility] with chronic white matter changes and parenchymal loss   - continue home Donepezil  - Neuro checks q4 hrs     #Cardiovascular:   Hypotensive likely 2/2 obstructive shock i/s/o saddle pulmonary embolism; intermediate high risk PE with +RHS and elevated biomarkers  - resolved after presentation, s/p 2L IVF at OSH. currently hemodynamically stable  - maintain MAP >65, vasopressors if needed  - hold home antihypertensives  - troponin 391 --> 359, pro-BNP 1331; continue to trend cardiac enzymes  - TTE report as above;  IV septum flattening, enarged RV size, reduced RV function with apical sparing (Grace's sign), and dilated IVC. LV function visually normal with estimated EF 60-65%.  - continue Heparin gtt for pulmonary embolism; will f/u Vascular and interventional cardiology/CTS regarding possible nonemergent thrombectomy in AM     Aortic stenosis, previously deferred evaluation of TAVR   - will hold further IVF  - f/u Cardiology recommendations      CAD s/p PCI to RCA with ADDISON in 2019   - continue ASA     #Respiratory   Acute hypoxic respiratory failure 2/2 saddle pulmonary embolism and RV compromise   - originally placed on NRB now weaned to 2L NC, maintain SpO2 >92% and wean supplemental O2 as tolerated   - management for PE as above     #GI  h/o GERD  - continue PPI  - NPO for now except meds given possible intervention  - FS q6 hrs   Transaminitis  - f/u LFTS   - consider abdominal ultrasound     #  h/o CKD3  - unclear baseline sCr, currently 1.53  - monitor UOP, strict I's and O's   endometrial heterogenous thickening on CT Abdomen   - will f/u Gyn, consider Pelvic ultrasound and uterine ca w/u given family history and now PE     #Endo  - f/u thyroid panel   - finger sticks q6 hrs while NPO   - f/u A1c     #ID  p/w hypotension, tachycardia, rectal temp 99.5, leukocytosis; although can all be attributed to PE will r/o sepsis etiology  - pt endorsed cough, will f/u RVP, sputum culture if pt expectorates  - CTH with acute sinus inflammatory changes   - f/u procalcitonin  - f/u blood cultures at OSH   - s/p Zosyn at OSH, will hold on further Antibiotics for now     #Heme/onc  Saddle pulmonary embolism  - unclear provoking factor, denies h/o clotting disorders  - will f/u hypercoagulability workup  - started on Heparin gtt, monitor aPTT; management for PE as above   - denies recent cancer screening, will f/u if pt agreeable   - f/u LE venous dopplers     #GOC/Ethics:  - Daughter Iqra Pierce is HCP  - Discussed GOC with pt and daughter at bedside, they would wish to discuss further amongst themselves and f/u discussion with MICU team today, for now pt remains FULL CODE  83 yo F with PMH of dementia, HTN, HLD, CKD3, CAD s/p PCI to RCA with ADDISON (11/2019) on ASA, severe AS, mild LVH, grade II LV diastolic dysfunction, mild-mod MR, mod TR, mild pulm HTN with RVSP 30mmHg (TTE 5/2022, normal EF 60%), presenting with witnessed syncope, hypotension and tachycardia responsive to IVF, acute hypoxic respiratory failure found to have saddle pulmonary embolism with bilateral extension, +right heart strain and elevated biomarkers classified as intermediate high risk PE, admitted to MICU for further monitoring.     PLAN:    #Neuro:  h/o dementia, as per family pt is forgetful at baseline however AAOx3, now p/w syncope   - syncope 2/2 PE, CTH at [facility] with chronic white matter changes and parenchymal loss   - continue home Donepezil  - Neuro checks q4 hrs     #Cardiovascular:   Hypotensive likely 2/2 obstructive shock i/s/o saddle pulmonary embolism and underlying aortic stenosis; intermediate high risk PE with +RHS and elevated biomarkers  - resolved after presentation, s/p 2L IVF at OSH. currently hemodynamically stable  - maintain MAP >65, vasopressors if needed  - hold home antihypertensives  - troponin 391 --> 359, pro-BNP 1331; continue to trend cardiac enzymes  - TTE report as above;  IV septum flattening, enlarged RV size, reduced RV function with apical sparing (Grace's sign), and dilated IVC. LV function visually normal with estimated EF 60-65%.  - continue Heparin gtt for pulmonary embolism; will f/u Vascular and interventional cardiology/CTS regarding possible nonemergent thrombectomy in AM     Aortic stenosis, previously deferred evaluation of TAVR   - will hold further IVF  - f/u Cardiology recommendations      CAD s/p PCI to RCA with ADDISON in 2019   - continue ASA     #Respiratory   Acute hypoxic respiratory failure 2/2 saddle pulmonary embolism and RV compromise   - originally placed on NRB now weaned to 2L NC, maintain SpO2 >92% and wean supplemental O2 as tolerated   - management for PE as above     #GI  h/o GERD  - continue PPI  - NPO for now except meds given possible intervention  - FS q6 hrs   Transaminitis  - f/u LFTS   - consider abdominal ultrasound     #  h/o CKD3  - unclear baseline sCr, currently 1.53  - monitor UOP, strict I's and O's   endometrial heterogenous thickening on CT Abdomen   - will f/u Gyn, consider Pelvic ultrasound and uterine ca w/u given family history and now PE     #Endo  - f/u thyroid panel   - finger sticks q6 hrs while NPO   - f/u A1c     #ID  p/w hypotension, tachycardia, rectal temp 99.5, leukocytosis; although can all be attributed to PE will r/o sepsis etiology  - pt endorsed cough, will f/u RVP, sputum culture if pt expectorates  - CTH with acute sinus inflammatory changes   - f/u procalcitonin  - f/u blood cultures at OSH   - s/p Zosyn at OSH, will hold on further Antibiotics for now     #Heme/onc  Saddle pulmonary embolism  - unclear provoking factor, denies h/o clotting disorders  - will f/u hypercoagulability workup  - started on Heparin gtt, monitor aPTT; management for PE as above   - denies recent cancer screening, will f/u if pt agreeable   - f/u LE venous dopplers     #GOC/Ethics:  - Daughter Iqra Pierce is HCP  - Discussed GOC with pt and daughter at bedside, they would wish to discuss further amongst themselves and f/u discussion with MICU team today, for now pt remains FULL CODE

## 2024-10-10 NOTE — H&P ADULT - ATTENDING COMMENTS
Patient seen and examined.  Agree with resident note as above.  Patient with hx as noted including CAD s/p PCI, severe AS who presented to ER with syncope and was found to have hypoTN/hypoxia/lactic acidosis that improved with IVF.  Found on labs/imaging to have +trop+BNP, SCOOTER, and saddle PE with clot in all lobar branches and resultant R heart dilation with 1.7 RV:LV ratio.  Stat echo performed and enlarged RV with reduced function seen.  Repeat trop/BNP are rising.  However, patient has stable vitals at rest and feels comfortable.    Case discussed with PERT team and due to patient's age and cardiopulmonary comorbidities, and her current stable vitals, any mechanical intervention is deferred for now.  Patient accepted to MICU for monitoring in that setting.    Will continue hep gtt, trend trop/BNP, check LE duplex.  Appreciate vasc cards and CTSGY input regarding need for mechanical interventions.  Patient with thickened endometrium that could represent a source for coagulopathy but patient's age and clinical status will likely preclude further workup.  Patient with low grade temp, will check RVP and procal to assess for concurrent infection.  Full AC as above.  Daughter is NOK.  Patient states she may want to be DNR but wants to think about it more.  For now, FULL CODE.    Rest of plan as above and I have edited as appropriate.

## 2024-10-10 NOTE — ED PROVIDER NOTE - PHYSICAL EXAMINATION
GEN: NAD, awake, eyes open spontaneously  EYES: normal conjunctiva, perrl  ENT: NCAT, MMM, Trachea midline  CHEST/LUNGS: Non-tachypneic, CTAB, bilateral breath sounds  CARDIAC: Non-tachycardic, normal perfusion  ABDOMEN: Soft, NTND, No rebound/guarding  MSK: No edema, no gross deformity of extremities  SKIN: No rashes, no petechiae, no vesicles  NEURO: CN grossly intact, normal coordination, no focal motor or sensory deficits  PSYCH: Alert, appropriate, cooperative, with capacity and insight GEN: NAD, awake, eyes open spontaneously  EYES: normal conjunctiva, perrl  ENT: NCAT, MMM, Trachea midline  CHEST/LUNGS: Non-tachypneic, CTAB, NC in place.   CARDIAC: Non-tachycardic, normal perfusion  ABDOMEN: Soft, NTND, No rebound/guarding  MSK: No edema, no gross deformity of extremities  SKIN: No rashes, no petechiae, no vesicles  NEURO: No focal deficits. Cooperative and conversive. Unable to recall events.   PSYCH: Alert, appropriate, cooperative, with capacity and insight

## 2024-10-10 NOTE — H&P ADULT - NSHPREVIEWOFSYSTEMS_GEN_ALL_CORE
Review of Systems:  CONSTITUTIONAL: +fatigue, no fever or chills  ENMT:  No difficulty hearing, tinnitus, vertigo; No sinus or throat pain  NECK: No pain or stiffness  RESPIRATORY: + dry cough, no wheezing or hemoptysis; No shortness of breath  CARDIOVASCULAR: No chest pain, palpitations, dizziness, or leg swelling  GASTROINTESTINAL: No abdominal or epigastric pain. No nausea, vomiting, or hematemesis; No diarrhea or constipation. No melena or hematochezia.  GENITOURINARY: No dysuria, frequency, hematuria, or incontinence  NEUROLOGICAL: +forgetfulness, No headaches, memory loss, loss of strength, numbness, or tremors  SKIN: No itching, burning, rashes, or lesions   MUSCULOSKELETAL: No joint pain or swelling; No muscle, back, or extremity pain  PSYCHIATRIC: No depression, anxiety, mood swings, or difficulty sleeping

## 2024-10-10 NOTE — PHYSICAL THERAPY INITIAL EVALUATION ADULT - LEVEL OF INDEPENDENCE: SIT/STAND, REHAB EVAL
contact guard Island Pedicle Flap With Canthal Suspension Text: The defect edges were debeveled with a #15 scalpel blade.  Given the location of the defect, shape of the defect and the proximity to free margins an island pedicle advancement flap was deemed most appropriate.  Using a sterile surgical marker, an appropriate advancement flap was drawn incorporating the defect, outlining the appropriate donor tissue and placing the expected incisions within the relaxed skin tension lines where possible. The area thus outlined was incised deep to adipose tissue with a #15 scalpel blade.  The skin margins were undermined to an appropriate distance in all directions around the primary defect and laterally outward around the island pedicle utilizing iris scissors.  There was minimal undermining beneath the pedicle flap. A suspension suture was placed in the canthal tendon to prevent tension and prevent ectropion.

## 2024-10-10 NOTE — ED ADULT NURSE NOTE - OBJECTIVE STATEMENT
85yo F w/ PMH dementia presents to ED via EMS tx for saddle PE. Pt being transferred for saddle PE from [facility], pt arrived from [place] on herpain gtt that was started at 0125 as per EMS and heparin bolus given. Pt went to [facility] after syncopal event, was hypotensive on arrival to [place], on CT scan pt was found to have saddle PE. Pt denies any pain at this time. A&Ox3, has intermittent confusion due to dementia hx. Strong peripheral pulses. Neurologically intact and follows commands. Pulse motor and sensation present and equal to all 4 extremities. Abdomen soft, nondistended, nontender to palpation. NSR on cardiac monitor. Skin warm dry intact and normal for ethnicity. Primafit placed on pt, skin dry and intact, primafit attached to wall suction, pt educated and verbalized understanding. family at bedside. Stretcher locked and in lowest position, appropriate side rails up. Pt instructed to notify RN if assistance is needed.

## 2024-10-10 NOTE — CONSULT NOTE ADULT - ATTENDING COMMENTS
Intermediate high risk PE. Hemodynamically stable, now on room air. No indication for advanced therapies given clinical stability, age and co-morbidities. Can transition to DOAC with run-in tomorrow.

## 2024-10-10 NOTE — H&P ADULT - CRITICAL CARE SERVICES
Refill Request     CONFIRM preferred pharmacy with the patient.    If Mail Order Rx - Pend for 90 day refill.      Last Seen: Last Seen Department: 6/14/2024  Last Seen by PCP: Visit date not found    Last Written: 9/13/2023    If no future appointment scheduled:  Review the last OV with PCP and review information for follow-up visit,  Route STAFF MESSAGE with patient name to the  Pool for scheduling with the following information:            -  Timing of next visit           -  Visit type ie Physical, OV, etc           -  Diagnoses/Reason ie. COPD, HTN - Do not use MEDICATION, Follow-up or CHECK UP - Give reason for visit      Next Appointment:   Future Appointments   Date Time Provider Department Center   8/8/2024  8:00 AM Micha Madrigal, DO [facility]       Message sent to  to schedule appt with patient?  NO      Requested Prescriptions     Pending Prescriptions Disp Refills    atorvastatin (LIPITOR) 20 MG tablet [Pharmacy Med Name: Atorvastatin Calcium 20 MG Oral Tablet] 90 tablet 3     Sig: TAKE 1 TABLET BY MOUTH ONCE  DAILY        40

## 2024-10-10 NOTE — CONSULT NOTE ADULT - ASSESSMENT
ASSESSMENT:   85 yo F with PMH of dementia, HTN, HLD, CKD3, CAD s/p PCI to RCA with ADDISON (11/2019) on ASA, severe AS, mild LVH, grade II LV diastolic dysfunction, mild-mod MR, mod TR, mild pulm HTN with RVSP 30mmHg (TTE 5/2022, normal EF 60%), presenting with syncope, hypotension, tachycardia, hypoxia 2/2 saddle pulmonary embolism with bilateral extension.    PLAN/RECOMMENDATIONS:    ASSESSMENT:   83 yo F with PMH of dementia, HTN, HLD, CKD3, CAD s/p PCI to RCA with ADDISON (11/2019) on ASA, severe AS, mild LVH, grade II LV diastolic dysfunction, mild-mod MR, mod TR, mild pulm HTN with RVSP 30mmHg (TTE 5/2022, normal EF 60%), presenting with witnessed syncope, hypotension and tachycardia responsive to IVF, acute hypoxic respiratory failure found to have saddle pulmonary embolism with bilateral extension, transferred to [facility] for PERT evaluation.    PLAN/RECOMMENDATIONS:    ASSESSMENT:   85 yo F with PMH of dementia, HTN, HLD, CKD3, CAD s/p PCI to RCA with ADDISON (11/2019) on ASA, severe AS, mild LVH, grade II LV diastolic dysfunction, mild-mod MR, mod TR, mild pulm HTN with RVSP 30mmHg (TTE 5/2022, normal EF 60%), presenting with witnessed syncope, hypotension and tachycardia responsive to IVF, acute hypoxic respiratory failure found to have saddle pulmonary embolism with bilateral extension, +right heart strain and elevated biomarkers classified as intermediate high risk PE, transferred for PERT evaluation.     PLAN/RECOMMENDATIONS:    ASSESSMENT:   83 yo F with PMH of dementia, HTN, HLD, CKD3, CAD s/p PCI to RCA with ADDISON (11/2019) on ASA, severe AS, mild LVH, grade II LV diastolic dysfunction, mild-mod MR, mod TR, mild pulm HTN with RVSP 30mmHg (TTE 5/2022, normal EF 60%), presenting with witnessed syncope, hypotension and tachycardia responsive to IVF, acute hypoxic respiratory failure found to have saddle pulmonary embolism with bilateral extension, +right heart strain and elevated biomarkers classified as intermediate high risk PE, transferred for PERT evaluation.     PLAN/RECOMMENDATIONS:   discussed with PERT Interventional Cardiology attending, given age, comorbidities and as pt currently hemodynamically stable, will manage conservatively with Heparin gtt for now and consider non-emergent thrombectomy after re-evaluation in AM with CT Surgery and Vascular cardiology teams  - will admit to MICU for further monitoring; f/u H&P for further plan

## 2024-10-10 NOTE — CONSULT NOTE ADULT - SUBJECTIVE AND OBJECTIVE BOX
PULMONARY EMBOLISM RESPONSE TEAM (PERT) CONSULTATION    HPI: 85 yo F with PMH of dementia, HTN, HLD, CKD3, CAD s/p PCI to RCA with ADDISON (2019) on ASA, severe AS, mild LVH, grade II LV diastolic dysfunction, mild-mod MR, mod TR, mild pulm HTN with RVSP 30mmHg (TTE 2022, normal EF 60%), presenting from [facility] to [facility] with syncope and +LOC, found to be hypotensive to 60s/40s. Reported to c/o fatigue, SOB, N/V on presentation. In ED pt /58, , SpO2 96% on NRB 15L, RR 25, temp 99.5 rectal. Labs notable for WBC 16.32, Hgb 10.6, pro- (previous pro- on 24), trop I 27, Lactate 6.1 improved to 1.6. EKG NSR with TWI in Lead III.  CTH negative for CVA, revealing chronic white matter changes and parenchymal loss with acute sinus inflammatory changes. CTA chest with findings of saddle PE extending into right and left main pulmonary arteries and into multiple b/l lobar, segmental, and subsegmental pulmonary branches with e/o right heart strain (RV/LV ratio 1.70); also with findings of heterogeneous and thickened endometrium. Pt received 2L IVF bolus, Zosyn, IV tylenol, Zofra and started on Heparin gtt. Pt with improved hemodynamics, normotensive with imrpoved tachycardia and weaning supplemental O2 requirements, transferred to [facility] for PERT evaluation.     On arrival to [facility] pt remains hemodynamically stable, /75, HR 85, RR 20, SpO2 99% on 2L NC, afebrile. On my exam pt denies complaints at this time, lying comfortably in bed. Pt states today she was at dinner when she was told she went pale and then "passed out." She endorses a recent dry cough, otherwise denies OLGUIN, SOB, fever, chills, n/v, chest pain, lower extremity swelling. She states that she resides at [facility], where "everyone is coughing," ambulates with a walker at baseline and sleeps with two pillows. She admits to staying in her bed often and her daughter at bedside states she has complained of feeling more tired of recent. Pt denies recent travel. Pt states her mother  in her 40s from uterine cancer and her father in his 40s as well from a heart attack. Denies family h/o clotting disorders. She admits she is not up to date on routine cancer screenings.     Review of Systems:  CONSTITUTIONAL: +fatigue, no fever or chills  ENMT:  No difficulty hearing, tinnitus, vertigo; No sinus or throat pain  NECK: No pain or stiffness  RESPIRATORY: + dry cough, no wheezing or hemoptysis; No shortness of breath  CARDIOVASCULAR: No chest pain, palpitations, dizziness, or leg swelling  GASTROINTESTINAL: No abdominal or epigastric pain. No nausea, vomiting, or hematemesis; No diarrhea or constipation. No melena or hematochezia.  GENITOURINARY: No dysuria, frequency, hematuria, or incontinence  NEUROLOGICAL: +forgetfulness, No headaches, memory loss, loss of strength, numbness, or tremors  SKIN: No itching, burning, rashes, or lesions   MUSCULOSKELETAL: No joint pain or swelling; No muscle, back, or extremity pain  PSYCHIATRIC: No depression, anxiety, mood swings, or difficulty sleeping    Vital Signs Last 24 Hrs  T(C): 36.3 (10 Oct 2024 01:57), Max: 36.3 (10 Oct 2024 01:57)  T(F): 97.4 (10 Oct 2024 01:57), Max: 97.4 (10 Oct 2024 01:57)  HR: 85 (10 Oct 2024 01:57) (85 - 85)  BP: 117/75 (10 Oct 2024 01:57) (117/75 - 117/75)  BP(mean): --  RR: 20 (10 Oct 2024 01:57) (20 - 20)  SpO2: 99% (10 Oct 2024 01:57) (99% - 99%)    Parameters below as of 10 Oct 2024 01:57  Patient On (Oxygen Delivery Method): nasal cannula  O2 Flow (L/min): 2    PAST MEDICAL & SURGICAL HISTORY:  Dementia  Aortic stenosis      Allergies    No Known Allergies    Intolerances      FAMILY HISTORY:   Mother  of uterine cancer in her 40s,   father  of heart attack in his 40s    Medications:  heparin   Injectable 6000 Unit(s) IV Push every 6 hours PRN  heparin   Injectable 3000 Unit(s) IV Push every 6 hours PRN  heparin  Infusion.  Unit(s)/Hr IV Continuous <Continuous>    Physical Examination:    General: No acute distress.    HEENT: Pupils equal, reactive to light.  Symmetric.  PULM: Clear to auscultation bilaterally, no significant sputum production  CVS: Regular rate and rhythm, no murmurs, rubs, or gallops  ABD: Soft, nondistended, nontender, normoactive bowel sounds, no masses  EXT: No edema, nontender  SKIN: Warm and well perfused, no rashes noted.  NEURO: Alert, oriented, interactive, nonfocal    I&O's Detail    LABS:                        9.5    10.80 )-----------( 176      ( 10 Oct 2024 02:26 )             31.0     10-10    138  |  106  |  40[H]  ----------------------------<  119[H]  4.5   |  18[L]  |  1.53[H]    Ca    8.7      10 Oct 2024 02:26    TPro  6.7  /  Alb  3.5  /  TBili  0.2  /  DBili  x   /  AST  76[H]  /  ALT  56[H]  /  AlkPhos  83  10-10    PT/INR - ( 10 Oct 2024 02:26 )   PT: 12.4 sec;   INR: 1.08 ratio      Troponin 391  pro-BNP 1331    VBG 7.33/39/34/21/58% Lactate 1.2    Urinalysis Basic - ( 10 Oct 2024 02:26 )    Color: x / Appearance: x / SG: x / pH: x  Gluc: 119 mg/dL / Ketone: x  / Bili: x / Urobili: x   Blood: x / Protein: x / Nitrite: x   Leuk Esterase: x / RBC: x / WBC x   Sq Epi: x / Non Sq Epi: x / Bacteria: x      CAPILLARY BLOOD GLUCOSE    CULTURES:    RADIOLOGY:     RISK STRATIFICATION FOR PULMONARY EMBOLISM:    sPESI (simplified pulmonary embolism severity index); The sPESI assigns one point for each of the following variables:   age >80 years  history of cancer   chronic cardiopulmonary disease   a heart rate =110 beats per minute   a systolic blood pressure <100 mmHg    an arterial oxyhemoglobin saturation <90 percent.    If sPESI score is zero, mortality 1%; if one or more, mortality 10%.      sPESI score for this patient: High risk  8.9% risk of death in the “High” risk group (>0 points)  RV:LV ratio:   Biomarkers (BNP/Troponin): 1331/391  FINAL RISK STRATIFICATION: Intermediate High risk with High risk features          PULMONARY EMBOLISM RESPONSE TEAM (PERT) CONSULTATION    HPI: 83 yo F with PMH of dementia, HTN, HLD, CKD3, CAD s/p PCI to RCA with ADDISON (2019) on ASA, severe AS, mild LVH, grade II LV diastolic dysfunction, mild-mod MR, mod TR, mild pulm HTN with RVSP 30mmHg (TTE 2022, normal EF 60%), presenting [facility] to [facility] with syncope and +LOC, found to be hypotensive to 60s/40s. Reported to c/o fatigue, SOB, N/V on presentation. In ED pt /58, , SpO2 96% on NRB 15L, RR 25, temp 99.5 rectal. Labs notable for WBC 16.32, Hgb 10.6, pro- (previous pro- on 24), trop I 27, Lactate 6.1 improved to 1.6. EKG NSR with TWI in Lead III.  CTH negative for CVA, revealing chronic white matter changes and parenchymal loss with acute sinus inflammatory changes. CTA chest with findings of saddle PE extending into right and left main pulmonary arteries and into multiple b/l lobar, segmental, and subsegmental pulmonary branches with e/o right heart strain (RV/LV ratio 1.70); also with findings of heterogeneous and thickened endometrium. Pt received 2L IVF bolus, Zosyn, IV tylenol, Zofra and started on Heparin gtt. Pt with improved hemodynamics, normotensive with imrpoved tachycardia and weaning supplemental O2 requirements, transferred to [facility] for PERT evaluation.     On arrival to [facility] pt remains hemodynamically stable, /75, HR 85, RR 20, SpO2 99% on 2L NC, afebrile. On my exam pt denies complaints at this time, lying comfortably in bed. Pt states today she was at dinner when she was told she went pale and then "passed out." She endorses a recent dry cough, otherwise denies OLGUIN, SOB, fever, chills, n/v, chest pain, lower extremity swelling. She resides at [facility], where "everyone is coughing," ambulates with a walker at baseline and sleeps with two pillows. She admits to staying in her bed often and her daughter at bedside states she has complained of feeling more tired of recent. Pt denies recent travel. Pt states her mother  in her 40s from uterine cancer and her father in his 40s as well from a heart attack. Denies family h/o clotting disorders. She admits she is not up to date on routine cancer screenings.     Review of Systems:  CONSTITUTIONAL: +fatigue, no fever or chills  ENMT:  No difficulty hearing, tinnitus, vertigo; No sinus or throat pain  NECK: No pain or stiffness  RESPIRATORY: + dry cough, no wheezing or hemoptysis; No shortness of breath  CARDIOVASCULAR: No chest pain, palpitations, dizziness, or leg swelling  GASTROINTESTINAL: No abdominal or epigastric pain. No nausea, vomiting, or hematemesis; No diarrhea or constipation. No melena or hematochezia.  GENITOURINARY: No dysuria, frequency, hematuria, or incontinence  NEUROLOGICAL: +forgetfulness, No headaches, memory loss, loss of strength, numbness, or tremors  SKIN: No itching, burning, rashes, or lesions   MUSCULOSKELETAL: No joint pain or swelling; No muscle, back, or extremity pain  PSYCHIATRIC: No depression, anxiety, mood swings, or difficulty sleeping    Vital Signs Last 24 Hrs  T(C): 36.3 (10 Oct 2024 01:57), Max: 36.3 (10 Oct 2024 01:57)  T(F): 97.4 (10 Oct 2024 01:57), Max: 97.4 (10 Oct 2024 01:57)  HR: 85 (10 Oct 2024 01:57) (85 - 85)  BP: 117/75 (10 Oct 2024 01:57) (117/75 - 117/75)  BP(mean): --  RR: 20 (10 Oct 2024 01:57) (20 - 20)  SpO2: 99% (10 Oct 2024 01:57) (99% - 99%)    Parameters below as of 10 Oct 2024 01:57  Patient On (Oxygen Delivery Method): nasal cannula  O2 Flow (L/min): 2    PAST MEDICAL & SURGICAL HISTORY:  Dementia  Aortic stenosis      Allergies    No Known Allergies    Intolerances      FAMILY HISTORY:   Mother  of uterine cancer in her 40s,   father  of heart attack in his 40s    Medications:  heparin   Injectable 6000 Unit(s) IV Push every 6 hours PRN  heparin   Injectable 3000 Unit(s) IV Push every 6 hours PRN  heparin  Infusion.  Unit(s)/Hr IV Continuous <Continuous>    Physical Examination:    General: No acute distress.    HEENT: Pupils equal, reactive to light.  Symmetric.  PULM: Clear to auscultation bilaterally, no significant sputum production  CVS: Regular rate and rhythm, no murmurs, rubs, or gallops  ABD: Soft, nondistended, nontender, normoactive bowel sounds, no masses  EXT: No edema, nontender  SKIN: Warm and well perfused, no rashes noted.  NEURO: Alert, oriented, interactive, nonfocal    I&O's Detail    LABS:                        9.5    10.80 )-----------( 176      ( 10 Oct 2024 02:26 )             31.0     10-10    138  |  106  |  40[H]  ----------------------------<  119[H]  4.5   |  18[L]  |  1.53[H]    Ca    8.7      10 Oct 2024 02:26    TPro  6.7  /  Alb  3.5  /  TBili  0.2  /  DBili  x   /  AST  76[H]  /  ALT  56[H]  /  AlkPhos  83  10-10    PT/INR - ( 10 Oct 2024 02:26 )   PT: 12.4 sec;   INR: 1.08 ratio      Troponin 391  pro-BNP 1331    VBG 7.33/39/34/21/58% Lactate 1.2    Urinalysis Basic - ( 10 Oct 2024 02:26 )    Color: x / Appearance: x / SG: x / pH: x  Gluc: 119 mg/dL / Ketone: x  / Bili: x / Urobili: x   Blood: x / Protein: x / Nitrite: x   Leuk Esterase: x / RBC: x / WBC x   Sq Epi: x / Non Sq Epi: x / Bacteria: x      CAPILLARY BLOOD GLUCOSE    CULTURES:    RADIOLOGY:     RISK STRATIFICATION FOR PULMONARY EMBOLISM:    sPESI (simplified pulmonary embolism severity index); The sPESI assigns one point for each of the following variables:   age >80 years  history of cancer   chronic cardiopulmonary disease   a heart rate =110 beats per minute   a systolic blood pressure <100 mmHg    an arterial oxyhemoglobin saturation <90 percent.    If sPESI score is zero, mortality 1%; if one or more, mortality 10%.      sPESI score for this patient: High risk  8.9% risk of death in the “High” risk group (>0 points)  RV:LV ratio:   Biomarkers (BNP/Troponin): 1331/391  FINAL RISK STRATIFICATION: Intermediate High risk with High risk features          PULMONARY EMBOLISM RESPONSE TEAM (PERT) CONSULTATION    HPI: 83 yo F with PMH of dementia, HTN, HLD, CKD3, CAD s/p PCI to RCA with ADDISON (2019) on ASA, severe AS, mild LVH, grade II LV diastolic dysfunction, mild-mod MR, mod TR, mild pulm HTN with RVSP 30mmHg (TTE 2022, normal EF 60%), presenting from [facility] to [facility] with syncope and +LOC, found to be hypotensive to 60s/40s. Reported to c/o fatigue, SOB, N/V on presentation. In ED pt /58, , SpO2 96% on NRB 15L, RR 25, temp 99.5 rectal. Labs notable for WBC 16.32, Hgb 10.6, pro- (previous pro- on 24), trop I 27, Lactate 6.1 improved to 1.6. EKG NSR with TWI in Lead III.  CTH negative for CVA, revealing chronic white matter changes and parenchymal loss with acute sinus inflammatory changes. CTA chest with findings of saddle PE extending into right and left main pulmonary arteries and into multiple b/l lobar, segmental, and subsegmental pulmonary branches with e/o right heart strain (RV/LV ratio 1.70); also with findings of heterogeneous and thickened endometrium. Pt received 2L IVF bolus, Zosyn, IV tylenol, Zofra and started on Heparin gtt. Pt with improved hemodynamics, normotensive with imrpoved tachycardia and weaning supplemental O2 requirements, transferred to [facility] for PERT evaluation.     On arrival to [facility] pt remains hemodynamically stable, /75, HR 85, RR 20, SpO2 99% on 2L NC, afebrile. On my exam pt denies complaints at this time, lying comfortably in bed. Pt states today she was at dinner when she was told she went pale and then "passed out." She endorses a recent dry cough, otherwise denies OLGUIN, SOB, fever, chills, n/v, chest pain, lower extremity swelling. She resides at [facility], where "everyone is coughing," ambulates with a walker at baseline and sleeps with two pillows. She admits to staying in her bed often and her daughter at bedside states she has complained of feeling more tired of recent. Pt denies recent travel. Pt states her mother  in her 40s from uterine cancer and her father in his 40s as well from a heart attack. Denies family h/o clotting disorders. She admits she is not up to date on routine cancer screenings.     Review of Systems:  CONSTITUTIONAL: +fatigue, no fever or chills  ENMT:  No difficulty hearing, tinnitus, vertigo; No sinus or throat pain  NECK: No pain or stiffness  RESPIRATORY: + dry cough, no wheezing or hemoptysis; No shortness of breath  CARDIOVASCULAR: No chest pain, palpitations, dizziness, or leg swelling  GASTROINTESTINAL: No abdominal or epigastric pain. No nausea, vomiting, or hematemesis; No diarrhea or constipation. No melena or hematochezia.  GENITOURINARY: No dysuria, frequency, hematuria, or incontinence  NEUROLOGICAL: +forgetfulness, No headaches, memory loss, loss of strength, numbness, or tremors  SKIN: No itching, burning, rashes, or lesions   MUSCULOSKELETAL: No joint pain or swelling; No muscle, back, or extremity pain  PSYCHIATRIC: No depression, anxiety, mood swings, or difficulty sleeping    Vital Signs Last 24 Hrs  T(C): 36.3 (10 Oct 2024 01:57), Max: 36.3 (10 Oct 2024 01:57)  T(F): 97.4 (10 Oct 2024 01:57), Max: 97.4 (10 Oct 2024 01:57)  HR: 85 (10 Oct 2024 01:57) (85 - 85)  BP: 117/75 (10 Oct 2024 01:57) (117/75 - 117/75)  BP(mean): --  RR: 20 (10 Oct 2024 01:57) (20 - 20)  SpO2: 99% (10 Oct 2024 01:57) (99% - 99%)    Parameters below as of 10 Oct 2024 01:57  Patient On (Oxygen Delivery Method): nasal cannula  O2 Flow (L/min): 2    PAST MEDICAL & SURGICAL HISTORY:  Dementia  Aortic stenosis      Allergies    No Known Allergies    Intolerances      FAMILY HISTORY:   Mother  of uterine cancer in her 40s,   father  of heart attack in his 40s    Medications:  heparin   Injectable 6000 Unit(s) IV Push every 6 hours PRN  heparin   Injectable 3000 Unit(s) IV Push every 6 hours PRN  heparin  Infusion.  Unit(s)/Hr IV Continuous <Continuous>    Physical Examination:    General: No acute distress.    HEENT: Pupils equal, reactive to light.  Symmetric.  PULM: Clear to auscultation bilaterally, no significant sputum production  CVS: Regular rate and rhythm, no murmurs, rubs, or gallops  ABD: Soft, nondistended, nontender, normoactive bowel sounds, no masses  EXT: No edema, nontender  SKIN: Warm and well perfused, no rashes noted.  NEURO: Alert, oriented, interactive, nonfocal    I&O's Detail    LABS:                        9.5    10.80 )-----------( 176      ( 10 Oct 2024 02:26 )             31.0     10-10    138  |  106  |  40[H]  ----------------------------<  119[H]  4.5   |  18[L]  |  1.53[H]    Ca    8.7      10 Oct 2024 02:26    TPro  6.7  /  Alb  3.5  /  TBili  0.2  /  DBili  x   /  AST  76[H]  /  ALT  56[H]  /  AlkPhos  83  10-10    PT/INR - ( 10 Oct 2024 02:26 )   PT: 12.4 sec;   INR: 1.08 ratio      Troponin 391  pro-BNP 1331    VBG 7.33/39/34/21/58% Lactate 1.2    Urinalysis Basic - ( 10 Oct 2024 02:26 )    Color: x / Appearance: x / SG: x / pH: x  Gluc: 119 mg/dL / Ketone: x  / Bili: x / Urobili: x   Blood: x / Protein: x / Nitrite: x   Leuk Esterase: x / RBC: x / WBC x   Sq Epi: x / Non Sq Epi: x / Bacteria: x      CAPILLARY BLOOD GLUCOSE    CULTURES:    RADIOLOGY:   ACC: [id number] EXAM:  CT ANGIO CHEST PULM [facility]   ORDERED BY:  GIANNA WERNER     PROCEDURE DATE:  10/10/2024      INTERPRETATION:  CLINICAL INFORMATION: Sepsis and syncope, hypotension.    COMPARISON: 2024.    CONTRAST/COMPLICATIONS:  IV Contrast: Omnipaque 350  90 cc administered   10 cc discarded  Oral Contrast: NONE  Complications: None reported at time of study completion    PROCEDURE:  CT Angiography of the Chest was performed followed by portal venous phase   imaging of the Abdomen and Pelvis.  Sagittal and coronal reformats were performed as well as 3D (MIP)   reconstructions.    FINDINGS:  CHEST:  PULMONARY ANGIOGRAM: Filling defects across the right and left main   pulmonary arteries extending into the all the lobar branches and into the   segmental and subsegmental pulmonary arteries the right upper, middle and   lower lobes, and left upper lobe. RV/LV ratio of 1.70 suggestive of right   heart strain.  LUNGS AND LARGE AIRWAYS: Patent central airways. Mild dependent   atelectasis of the lung bases.  PLEURA: No pleural effusion.  VESSELS: Atherosclerotic calcifications of the aorta and coronary   arteries. Aortic valvular and mitral annular calcifications.  HEART: Heart size is nonenlarged. No pericardial effusion.  MEDIASTINUM AND JOYCE: No lymphadenopathy.  CHEST WALL AND LOWER NECK: Within normal limits.    ABDOMEN AND PELVIS:  LIVER: Within normal limits.  BILE DUCTS: Normal caliber.  GALLBLADDER: Cholelithiasis.  SPLEEN: Within normal limits.  PANCREAS: Within normal limits.  ADRENALS: Within normal limits.  KIDNEYS/URETERS: Left kidney is atrophic. No hydronephrosis.    BLADDER: Within normal limits.  REPRODUCTIVE ORGANS: Endometrium is thickened and measures up to 2.2 cm.    BOWEL: No bowel obstruction. Appendix is normal.  PERITONEUM/RETROPERITONEUM: Within normal limits.  VESSELS: Atherosclerotic changes.  LYMPH NODES: No lymphadenopathy.  ABDOMINAL WALL: Within normal limits.  BONES: Degenerative changes. Scoliosis.    IMPRESSION:  Saddle pulmonary embolus extending into the right left main pulmonary   arteries, and into multiple bilateral lobar, segmental, and subsegmental   pulmonary branches as above. Evidence of right heart strain.    As on the prior study of 2024, the endometrium appears heterogeneous   and thickened. This may be further assessed with nonemergent ultrasound.   Consider gynecologic consultation if clinically warranted.    Findings were discussed with Dr. Brown 10/10/2024 1:17 AM by Dr. Manning   with read back confirmation.    --- End of Report ---     CHARLIE MANNING MD; Attending Radiologist  This document has been electronically signed. Oct 10 2024  1:21AM  Abnormal.        RISK STRATIFICATION FOR PULMONARY EMBOLISM:    sPESI (simplified pulmonary embolism severity index); The sPESI assigns one point for each of the following variables:   age >80 years  history of cancer   chronic cardiopulmonary disease   a heart rate =110 beats per minute   a systolic blood pressure <100 mmHg    an arterial oxyhemoglobin saturation <90 percent.    If sPESI score is zero, mortality 1%; if one or more, mortality 10%.      sPESI score for this patient: High risk  8.9% risk of death in the “High” risk group (>0 points)  RV:LV ratio:   Biomarkers (BNP/Troponin): 1331/391  FINAL RISK STRATIFICATION: Intermediate High risk with High risk features          PULMONARY EMBOLISM RESPONSE TEAM (PERT) CONSULTATION    HPI: 83 yo F with PMH of dementia, HTN, HLD, CKD3, CAD s/p PCI to RCA with ADDISON (2019) on ASA, severe AS, mild LVH, grade II LV diastolic dysfunction, mild-mod MR, mod TR, mild pulm HTN with RVSP 30mmHg (TTE 2022, normal EF 60%), presenting from [facility] to [facility] with syncope and +LOC, found to be hypotensive to 60s/40s. Reported to c/o fatigue, SOB, N/V on presentation. In ED pt /58, , SpO2 96% on NRB 15L, RR 25, temp 99.5 rectal. Labs notable for WBC 16.32, Hgb 10.6, pro- (previous pro- on 24), trop I 27, Lactate 6.1 improved to 1.6. EKG NSR with TWI in Lead III.  CTH negative for CVA, revealing chronic white matter changes and parenchymal loss with acute sinus inflammatory changes. CTA chest with findings of saddle PE extending into right and left main pulmonary arteries and into multiple b/l lobar, segmental, and subsegmental pulmonary branches with e/o right heart strain (RV/LV ratio 1.70); also with findings of heterogeneous and thickened endometrium. Pt received 2L IVF bolus, Zosyn, IV tylenol, Zofra and started on Heparin gtt. Pt with improved hemodynamics, normotensive with imrpoved tachycardia and weaning supplemental O2 requirements, transferred to [facility] for PERT evaluation.     On arrival to [facility] pt remains hemodynamically stable, /75, HR 85, RR 20, SpO2 99% on 2L NC, afebrile. On my exam pt denies complaints at this time, lying comfortably in bed. Pt states today she was at dinner when she was told she went pale and then "passed out." She endorses a recent dry cough, otherwise denies OLGUIN, SOB, fever, chills, n/v, chest pain, lower extremity swelling. She resides at [facility], where "everyone is coughing," ambulates with a walker at baseline and sleeps with two pillows. She admits to staying in her bed often and her daughter at bedside states she has complained of feeling more tired of recent. Pt denies recent travel. Pt states her mother  in her 40s from uterine cancer and her father in his 40s as well from a heart attack. Denies family h/o clotting disorders. She admits she is not up to date on routine cancer screenings.     Review of Systems:  CONSTITUTIONAL: +fatigue, no fever or chills  ENMT:  No difficulty hearing, tinnitus, vertigo; No sinus or throat pain  NECK: No pain or stiffness  RESPIRATORY: + dry cough, no wheezing or hemoptysis; No shortness of breath  CARDIOVASCULAR: No chest pain, palpitations, dizziness, or leg swelling  GASTROINTESTINAL: No abdominal or epigastric pain. No nausea, vomiting, or hematemesis; No diarrhea or constipation. No melena or hematochezia.  GENITOURINARY: No dysuria, frequency, hematuria, or incontinence  NEUROLOGICAL: +forgetfulness, No headaches, memory loss, loss of strength, numbness, or tremors  SKIN: No itching, burning, rashes, or lesions   MUSCULOSKELETAL: No joint pain or swelling; No muscle, back, or extremity pain  PSYCHIATRIC: No depression, anxiety, mood swings, or difficulty sleeping    Vital Signs Last 24 Hrs  T(C): 36.3 (10 Oct 2024 01:57), Max: 36.3 (10 Oct 2024 01:57)  T(F): 97.4 (10 Oct 2024 01:57), Max: 97.4 (10 Oct 2024 01:57)  HR: 85 (10 Oct 2024 01:57) (85 - 85)  BP: 117/75 (10 Oct 2024 01:57) (117/75 - 117/75)  BP(mean): --  RR: 20 (10 Oct 2024 01:57) (20 - 20)  SpO2: 99% (10 Oct 2024 01:57) (99% - 99%)    Parameters below as of 10 Oct 2024 01:57  Patient On (Oxygen Delivery Method): nasal cannula  O2 Flow (L/min): 2    PAST MEDICAL & SURGICAL HISTORY:  Dementia  Aortic stenosis      Allergies    No Known Allergies    Intolerances      FAMILY HISTORY:   Mother  of uterine cancer in her 40s,   father  of heart attack in his 40s    Medications:  heparin   Injectable 6000 Unit(s) IV Push every 6 hours PRN  heparin   Injectable 3000 Unit(s) IV Push every 6 hours PRN  heparin  Infusion.  Unit(s)/Hr IV Continuous <Continuous>    Physical Examination:    General: No acute distress.    HEENT: Pupils equal, reactive to light.  Symmetric.  PULM: Clear to auscultation bilaterally, no significant sputum production  CVS: Regular rate and rhythm, no murmurs, rubs, or gallops  ABD: Soft, nondistended, nontender, normoactive bowel sounds, no masses  EXT: No edema, nontender  SKIN: Warm and well perfused, no rashes noted.  NEURO: Alert, oriented, interactive, nonfocal    I&O's Detail    LABS:                        9.5    10.80 )-----------( 176      ( 10 Oct 2024 02:26 )             31.0     10-10    138  |  106  |  40[H]  ----------------------------<  119[H]  4.5   |  18[L]  |  1.53[H]    Ca    8.7      10 Oct 2024 02:26    TPro  6.7  /  Alb  3.5  /  TBili  0.2  /  DBili  x   /  AST  76[H]  /  ALT  56[H]  /  AlkPhos  83  10-10    PT/INR - ( 10 Oct 2024 02:26 )   PT: 12.4 sec;   INR: 1.08 ratio      Troponin 391  pro-BNP 1331    VBG 7.33/39/34/21/58% Lactate 1.2    Urinalysis Basic - ( 10 Oct 2024 02:26 )    Color: x / Appearance: x / SG: x / pH: x  Gluc: 119 mg/dL / Ketone: x  / Bili: x / Urobili: x   Blood: x / Protein: x / Nitrite: x   Leuk Esterase: x / RBC: x / WBC x   Sq Epi: x / Non Sq Epi: x / Bacteria: x      CAPILLARY BLOOD GLUCOSE    CULTURES:    RADIOLOGY:   ACC: [id number] EXAM:  CT ANGIO CHEST PULM [facility]   ORDERED BY:  GIANNA WERNER     PROCEDURE DATE:  10/10/2024      INTERPRETATION:  CLINICAL INFORMATION: Sepsis and syncope, hypotension.    COMPARISON: 2024.    CONTRAST/COMPLICATIONS:  IV Contrast: Omnipaque 350  90 cc administered   10 cc discarded  Oral Contrast: NONE  Complications: None reported at time of study completion    PROCEDURE:  CT Angiography of the Chest was performed followed by portal venous phase   imaging of the Abdomen and Pelvis.  Sagittal and coronal reformats were performed as well as 3D (MIP)   reconstructions.    FINDINGS:  CHEST:  PULMONARY ANGIOGRAM: Filling defects across the right and left main   pulmonary arteries extending into the all the lobar branches and into the   segmental and subsegmental pulmonary arteries the right upper, middle and   lower lobes, and left upper lobe. RV/LV ratio of 1.70 suggestive of right   heart strain.  LUNGS AND LARGE AIRWAYS: Patent central airways. Mild dependent   atelectasis of the lung bases.  PLEURA: No pleural effusion.  VESSELS: Atherosclerotic calcifications of the aorta and coronary   arteries. Aortic valvular and mitral annular calcifications.  HEART: Heart size is nonenlarged. No pericardial effusion.  MEDIASTINUM AND JOYCE: No lymphadenopathy.  CHEST WALL AND LOWER NECK: Within normal limits.    ABDOMEN AND PELVIS:  LIVER: Within normal limits.  BILE DUCTS: Normal caliber.  GALLBLADDER: Cholelithiasis.  SPLEEN: Within normal limits.  PANCREAS: Within normal limits.  ADRENALS: Within normal limits.  KIDNEYS/URETERS: Left kidney is atrophic. No hydronephrosis.    BLADDER: Within normal limits.  REPRODUCTIVE ORGANS: Endometrium is thickened and measures up to 2.2 cm.    BOWEL: No bowel obstruction. Appendix is normal.  PERITONEUM/RETROPERITONEUM: Within normal limits.  VESSELS: Atherosclerotic changes.  LYMPH NODES: No lymphadenopathy.  ABDOMINAL WALL: Within normal limits.  BONES: Degenerative changes. Scoliosis.    IMPRESSION:  Saddle pulmonary embolus extending into the right left main pulmonary   arteries, and into multiple bilateral lobar, segmental, and subsegmental   pulmonary branches as above. Evidence of right heart strain.    As on the prior study of 2024, the endometrium appears heterogeneous   and thickened. This may be further assessed with nonemergent ultrasound.   Consider gynecologic consultation if clinically warranted.    Findings were discussed with Dr. Brown 10/10/2024 1:17 AM by Dr. Manning   with read back confirmation.    --- End of Report ---     CHARLIE MANNING MD; Attending Radiologist  This document has been electronically signed. Oct 10 2024  1:21AM  Abnormal.    < from: TTE W or WO Ultrasound Enhancing Agent (10.10.24 @ 03:03) >  TRANSTHORACIC ECHOCARDIOGRAM REPORT  ________________________________________________________________________________                                      _______       *Report Contains Critical Result*       Pt. Name:       VALE QUIJANO  Study Date:    10/10/2024  MRN:            [id number]   YOB: 1940  Accession #:    [id number]    Age:           84 years  Account#:       [id number] Gender:        F  Heart Rate:                  Height:        ( )  Rhythm:                      Weight:        ( )  Blood Pressure: 130/75 mmHg  BSA/BMI:       /  ________________________________________________________________________________________  Referring Physician:    Ryan Nixon  Interpreting Physician: Dante Vann M.D.  Primary Sonographer:    Ryan Nixon  Fellow (Performing):    Ryan Nixon    CPT:               ECHO TTE WO CON COMP W DOPP - 68400.m  Indication(s):     Other pulmonary embolism with acute cor pulmonale - I26.09  Procedure:         Transthoracic echocardiogram with 2-D, M-mode and complete                     spectral and color flow Doppler.  Ordering Location: [facility]  Admission Status:  Inpatient    _______________________________________________________________________________________     CONCLUSIONS:      1. The interventricular septum is flattened in diastole ('D' shaped left ventricle) consistent with right ventricular volume overload. Left ventricular systolic function is normal with an ejection fraction visually estimated at 60 to 65 %.   2. Enlarged right ventricular cavity size and reduced right ventricular systolic function.   3. No prior echocardiogram is available for comparison.   4. Right ventricular systolic function is reduced with apical sparing consistent with acute pulmonary embolism (Grace's sign).   5. The inferior vena cava is dilated (dilated >2.1cm) with abnormal inspiratory collapse (abnormal <50%) consistent with elevated right atrial pressure (~15, range 10-20mmHg).    ________________________________________________________________________________________  *Report Contains Critical Result*  Critical Findings: RV strain (Possible Pulmonary Embolus). Dr. Nixon was  informed of the findings by 2-way electronic message and by telephone on  10/10/2024 at 4:06:50 AM.       ________________________________________________________________________________________  FINDINGS:     Left Ventricle:  The interventricular septum is flattened in diastole ('D' shaped left ventricle) consistent with right ventricular volume overload. Left ventricular systolic function is normal with an ejection fraction visually estimated at 60 to 65%.     Right Ventricle:  The right ventricular cavity is enlarged in size and right ventricular systolic function is reduced. Right ventricular systolic function is reduced with apical sparing consistent with acute pulmonary embolism (Grace's sign).     Pericardium:  No pericardial effusion seen.     Systemic Veins:  The inferior vena cava is dilated (dilated >2.1cm) with abnormal inspiratory collapse (abnormal <50%) consistent with elevated right atrial pressure (~15, range 10-20mmHg).  ____________________________________________________________________  QUANTITATIVE DATA:  Left Ventricle Measurements: (Indexed to BSA)     Visualized LV EF%: 60 to 65%       Tricuspid Valve Measurements:     RA Pressure: 15 mmHg    ________________________________________________________________________________________  Electronically signed on 10/10/2024 at 4:08:02 AM Mey Vann M.D.       *** Final ***    < end of copied text >      RISK STRATIFICATION FOR PULMONARY EMBOLISM:    sPESI (simplified pulmonary embolism severity index); The sPESI assigns one point for each of the following variables:   age >80 years  history of cancer   chronic cardiopulmonary disease   a heart rate =110 beats per minute   a systolic blood pressure <100 mmHg    an arterial oxyhemoglobin saturation <90 percent.    If sPESI score is zero, mortality 1%; if one or more, mortality 10%.      sPESI score for this patient: High risk  8.9% risk of death in the “High” risk group (>0 points)  RV:LV ratio:   Biomarkers (BNP/Troponin): 1331/391 --> 359  FINAL RISK STRATIFICATION: Intermediate High risk with High risk features

## 2024-10-10 NOTE — H&P ADULT - NSICDXPASTMEDICALHX_GEN_ALL_CORE_FT
PAST MEDICAL HISTORY:  Aortic stenosis     CAD (coronary artery disease)     Dementia     GERD (gastroesophageal reflux disease)     Hyperlipidemia     Hypertension

## 2024-10-10 NOTE — PHYSICAL THERAPY INITIAL EVALUATION ADULT - ADDITIONAL COMMENTS
Per care coordination note, pt lives in at [facility] living [facility] where she was ambulating independently with a rolling walker and also owns a wheelchair. Pt required some assistance with ADLs at times.

## 2024-10-10 NOTE — PROGRESS NOTE ADULT - SUBJECTIVE AND OBJECTIVE BOX
INTERVAL HPI/OVERNIGHT EVENTS:    85 yo F with PMH of dementia, HTN, HLD, CKD3, CAD s/p PCI to RCA with ADDISON (11/2019) on ASA, severe AS, mild LVH, grade II LV diastolic dysfunction, mild-mod MR, mod TR, mild pulm HTN with RVSP 30mmHg (TTE 5/2022, normal EF 60%), presenting [facility] to [facility] with syncope and +LOC, found to be hypotensive to 60s/40s. Reported to c/o fatigue, SOB, N/V on presentation. In ED pt /58, , SpO2 96% on NRB 15L, RR 25, temp 99.5 rectal. Labs notable for WBC 16.32, Hgb 10.6, pro- (previous pro- on 6/29/24), trop I 27, Lactate 6.1 improved to 1.6. EKG NSR with TWI in Lead III.  CTH negative for CVA, revealing chronic white matter changes and parenchymal loss with acute sinus inflammatory changes. CTA chest with findings of saddle PE extending into right and left main pulmonary arteries and into multiple b/l lobar, segmental, and subsegmental pulmonary branches with e/o right heart strain (RV/LV ratio 1.70); also with findings of heterogeneous and thickened endometrium. Pt received 2L IVF bolus, Zosyn, IV tylenol, Zofra and started on Heparin gtt. Pt with improved hemodynamics, normotensive with imrpoved tachycardia and weaning supplemental O2 requirements, transferred to [facility] for PERT evaluation.     VITAL SIGNS:  ICU Vital Signs Last 24 Hrs  T(C): 36.2 (10 Oct 2024 08:00), Max: 37.5 (09 Oct 2024 19:16)  T(F): 97.2 (10 Oct 2024 08:00), Max: 99.5 (09 Oct 2024 19:16)  HR: 71 (10 Oct 2024 10:00) (70 - 112)  BP: 126/58 (10 Oct 2024 10:00) (111/60 - 167/81)  BP(mean): 83 (10 Oct 2024 10:00) (80 - 116)  ABP: --  ABP(mean): --  RR: 20 (10 Oct 2024 10:00) (18 - 25)  SpO2: 97% (10 Oct 2024 10:00) (91% - 100%)    O2 Parameters below as of 10 Oct 2024 08:00  Patient On (Oxygen Delivery Method): nasal cannula  O2 Flow (L/min):       PHYSICAL EXAM:  GENERAL: Laying comfortably, NAD  EYES: EOMI, PERRL, no scleral icterus  NECK: No JVD  LUNG: Clear to auscultation bilaterally; No wheeze, crackles or rhonci  HEART: Regular rate and rhythm; No murmurs, rubs, or gallops  ABDOMEN: Soft, Nontender, Nondistended  EXTREMITIES:  No LE edema, 2+ Peripheral Pulses, No clubbing, cyanosis, or edema  PSYCH: AAOx3  NEUROLOGY: non-focal, strength 5/5 in all extremities, sensation intact  SKIN: No rashes or lesions              MEDICATIONS:  MEDICATIONS  (STANDING):  aspirin  chewable 81 milliGRAM(s) Oral daily  donepezil 5 milliGRAM(s) Oral at bedtime  heparin  Infusion. 1000 Unit(s)/Hr (10 mL/Hr) IV Continuous <Continuous>  pantoprazole    Tablet 40 milliGRAM(s) Oral before breakfast    MEDICATIONS  (PRN):      ALLERGIES:  Allergies    No Known Allergies    Intolerances        LABS:                        10.2   9.53  )-----------( 163      ( 10 Oct 2024 06:24 )             33.1     10-10    139  |  108  |  39[H]  ----------------------------<  106[H]  4.9   |  17[L]  |  1.53[H]    Ca    8.9      10 Oct 2024 06:24  Phos  3.7     10-10  Mg     2.5     10-10    TPro  7.0  /  Alb  3.6  /  TBili  0.3  /  DBili  x   /  AST  65[H]  /  ALT  51[H]  /  AlkPhos  83  10-10    PT/INR - ( 10 Oct 2024 06:24 )   PT: 11.9 sec;   INR: 1.04 ratio         PTT - ( 10 Oct 2024 06:24 )  PTT:53.6 sec  Urinalysis Basic - ( 10 Oct 2024 06:24 )    Color: x / Appearance: x / SG: x / pH: x  Gluc: 106 mg/dL / Ketone: x  / Bili: x / Urobili: x   Blood: x / Protein: x / Nitrite: x   Leuk Esterase: x / RBC: x / WBC x   Sq Epi: x / Non Sq Epi: x / Bacteria: x        CAPILLARY BLOOD GLUCOSE      POCT Blood Glucose.: 217 mg/dL (09 Oct 2024 18:42)      RADIOLOGY & ADDITIONAL TESTS: Reviewed.

## 2024-10-10 NOTE — PROGRESS NOTE ADULT - ASSESSMENT
85 yo F with PMH of dementia, HTN, HLD, CKD3, CAD s/p PCI to RCA with ADDISON (11/2019) on ASA, severe AS, mild LVH, grade II LV diastolic dysfunction, mild-mod MR, mod TR, mild pulm HTN with RVSP 30mmHg (TTE 5/2022, normal EF 60%), presenting with witnessed syncope, hypotension and tachycardia responsive to IVF, acute hypoxic respiratory failure found to have saddle pulmonary embolism with bilateral extension, +right heart strain and elevated biomarkers classified as intermediate high risk PE, admitted to MICU for further monitoring.     PLAN:    #Neuro:  h/o dementia, as per family pt is forgetful at baseline however AAOx3, now p/w syncope   - syncope 2/2 PE, CTH at [facility] with chronic white matter changes and parenchymal loss   - continue home Donepezil  - Neuro checks q4 hrs     #Cardiovascular:   Hypotensive likely 2/2 obstructive shock i/s/o saddle pulmonary embolism and underlying aortic stenosis; intermediate high risk PE with +RHS and elevated biomarkers  - resolved after presentation, s/p 2L IVF at OSH. currently hemodynamically stable  - maintain MAP >65, vasopressors if needed  - hold home antihypertensives  - troponin 391 --> 359 -->304, pro-BNP 1331-->2306;   - TTE report as above;  IV septum flattening, enlarged RV size, reduced RV function with apical sparing (Grace's sign), and dilated IVC. LV function visually normal with estimated EF 60-65%.  - continue Heparin gtt for pulmonary embolism; will f/u Vascular and interventional cardiology/CTS regarding possible nonemergent thrombectomy in AM     Aortic stenosis, previously deferred evaluation of TAVR   - will hold further IVF  - f/u Cardiology recommendations      CAD s/p PCI to RCA with ADDISON in 2019   - continue ASA     #Respiratory   Acute hypoxic respiratory failure 2/2 saddle pulmonary embolism and RV compromise   - originally placed on NRB now weaned to 2L NC, maintain SpO2 >92% and wean supplemental O2 as tolerated   - management for PE as above     #GI  h/o GERD  - continue PPI  - Ordered DASH diet  - FS q6 hrs   Transaminitis  - f/u LFTS   - consider abdominal ultrasound     #  h/o CKD3  - unclear baseline sCr, currently 1.53  - monitor UOP, strict I's and O's   endometrial heterogenous thickening on CT Abdomen   - will f/u Gyn, consider Pelvic ultrasound and uterine ca w/u given family history and now PE     #Endo  - f/u thyroid panel   - finger sticks q6 hrs while NPO   - f/u A1c     #ID  p/w hypotension, tachycardia, rectal temp 99.5, leukocytosis; although can all be attributed to PE will r/o sepsis etiology  - pt endorsed cough, will f/u RVP, sputum culture if pt expectorates  - CTH with acute sinus inflammatory changes   - f/u procalcitonin  - f/u blood cultures at OSH   - s/p Zosyn at OSH, will hold on further Antibiotics for now     #Heme/onc  Saddle pulmonary embolism  - unclear provoking factor, denies h/o clotting disorders  - will f/u hypercoagulability workup  - started on Heparin gtt, monitor aPTT; management for PE as above   - denies recent cancer screening, will f/u if pt agreeable   - LE venous dopplers with b/l DVT above/below the knew involving popiteal vein and tibioperoneal trunk    #GOC/Ethics:  - Daughter Iqra Pierce is HCP  - Discussed GOC with pt and daughter at bedside, they would wish to discuss further amongst themselves and f/u discussion with MICU team today, for now pt remains FULL CODE      85 yo F with PMH of dementia, HTN, HLD, CKD3, CAD s/p PCI to RCA with ADDISON (11/2019) on ASA, severe AS, mild LVH, grade II LV diastolic dysfunction, mild-mod MR, mod TR, mild pulm HTN with RVSP 30mmHg (TTE 5/2022, normal EF 60%), presenting with witnessed syncope, hypotension and tachycardia responsive to IVF, acute hypoxic respiratory failure found to have saddle pulmonary embolism with bilateral extension, +right heart strain and elevated biomarkers classified as intermediate high risk PE, admitted to MICU for further monitoring.     PLAN:    #Neuro:  h/o dementia, as per family pt is forgetful at baseline however AAOx3, now p/w syncope   - syncope 2/2 PE, CTH at [facility] with chronic white matter changes and parenchymal loss   - continue home Donepezil  - Neuro checks q4 hrs   - PT/OT    #Cardiovascular:   Hypotensive likely 2/2 obstructive shock i/s/o saddle pulmonary embolism and underlying aortic stenosis; intermediate high risk PE with +RHS and elevated biomarkers  - resolved after presentation, s/p 2L IVF at OSH. currently hemodynamically stable  - maintain MAP >65, vasopressors if needed  - hold home antihypertensives  - troponin 391 --> 359 -->304, pro-BNP 1331-->2306;   - TTE report as above;  IV septum flattening, enlarged RV size, reduced RV function with apical sparing (Grace's sign), and dilated IVC. LV function visually normal with estimated EF 60-65%.  - continue Heparin gtt for pulmonary embolism; will f/u Vascular and interventional cardiology/CTS regarding possible nonemergent thrombectomy in AM     Aortic stenosis, previously deferred evaluation of TAVR   - will hold further IVF  - f/u Cardiology recommendations      CAD s/p PCI to RCA with ADDISON in 2019   - continue ASA     #Respiratory   Acute hypoxic respiratory failure 2/2 saddle pulmonary embolism and RV compromise   - originally placed on NRB now weaned to 2L NC, maintain SpO2 >92% and wean supplemental O2 as tolerated   - management for PE as above   - Aspiration precautions    #GI  h/o GERD  - continue PPI  - Ordered DASH diet  - FS q6 hrs   Transaminitis  - f/u LFTS     #  h/o CKD3  - unclear baseline sCr, currently 1.53  - monitor UOP, strict I's and O's   - endometrial heterogenous thickening on CT Abdomen   - May need future Gyn evaluation given family history and now PE     #Endo  - f/u thyroid panel   - finger sticks q6 hrs while NPO   - f/u A1c     #ID  p/w hypotension, tachycardia, rectal temp 99.5, leukocytosis; although can all be attributed to PE will r/o sepsis etiology  - pt endorsed cough, will f/u RVP, sputum culture if pt expectorates  - CTH with acute sinus inflammatory changes   - f/u procalcitonin  - f/u blood cultures at OSH   - s/p Zosyn at OSH, will hold on further Antibiotics for now     #Heme/onc  Saddle pulmonary embolism  - unclear provoking factor, denies h/o clotting disorders  - will f/u hypercoagulability workup  - started on Heparin gtt, monitor aPTT; management for PE as above   - denies recent cancer screening, will f/u if pt agreeable   - LE venous dopplers with b/l DVT above/below the knew involving popiteal vein and tibioperoneal trunk    #GOC/Ethics:  - Daughter Iqra Pierce is HCP  - Discussed GOC with pt and daughter at bedside, they would wish to discuss further amongst themselves and f/u discussion with MICU team today, for now pt remains FULL CODE

## 2024-10-10 NOTE — ED PROVIDER NOTE - PROGRESS NOTE DETAILS
Betty Brooks DO (PGY-2): MICU at bedside. Betty Brooks DO (PGY-2): Cardiology at bedside. Stat echo ordered.

## 2024-10-10 NOTE — CONSULT NOTE ADULT - ASSESSMENT
85 yo F with PMH of dementia, HTN, HLD, CKD3, CAD s/p PCI to RCA with ADDISON (11/2019) on ASA, severe AS, mild LVH, grade II LV diastolic dysfunction, mild-mod MR, mod TR, mild pulm HTN with RVSP 30mmHg (TTE 5/2022, normal EF 60%), presenting from [facility] to [facility] with syncope and +LOC, found to have saddle PE. Initially hypotensive but vital signs normalized with 2L fluids and initiation of heparin gtt. Imaging c/w RV strain. sPESI score 1. In all classified as intermediate-high risk PE. MICU team discussed case with PERT attending Dr. Valera, who recommends monitoring on heparin gtt at this time given age and comorbidities and pt overall HD stable and asymptomatic.     Recommendations:  -C/W heparin gtt as above. Likely transition to eliquis on discharge  -Monitor VS, notify vascular/PERT team if signs of clinical worsening.  -Trend troponin to peak    Ryan Nixon, PGY-5  Cardiology Fellow

## 2024-10-10 NOTE — ED PROVIDER NOTE - ATTENDING CONTRIBUTION TO CARE
Jack Vazquez DO: I have personally performed a face to face medical and diagnostic evaluation of the patient. I have discussed with and reviewed the Resident's and/or ACP's and/or Medical/PA/NP student's note and agree with the History, ROS, Physical Exam and MDM unless otherwise indicated. A brief summary of my personal evaluation and impression can be found below.     85 yo F with PMH of dementia, HTN, HLD, CKD3, CAD s/p PCI to RCA with ADDISON (11/2019) on ASA, severe AS, mild LVH, grade II LV diastolic dysfunction, mild-mod MR, mod TR, mild pulm HTN with RVSP 30mmHg (TTE 5/2022, normal EF 60%), initially presented to [facility] with syncope and +LOC, found to be hypotensive to 60s/40s, SOB, N/V on presentation found to have saddle PE extending into right and left main pulmonary arteries and into multiple b/l lobar, segmental, and subsegmental pulmonary branches with e/o right heart strain (RV/LV ratio 1.70). Hemodynamics approved w/ ivf bolus, Heparin gtt. Arrives normotensive, PERT team already at bedside upon patient's arrival to the ED.  Patient denies any symptoms currently, vital signs are normal.    CONSTITUTIONAL: NAD  SKIN: Warm dry, normal skin turgor  HEAD: NCAT  EYES: EOMI, PERRLA, no scleral icterus, conjunctiva pink  NECK: Supple; non tender. Full ROM.  CARD: RRR  RESP: No respiratory distress  ABD: non-distended, no abdominal tenderness  MSK: Full ROM, no leg swelling  PSYCH: Cooperative, appropriate, poor memory     Given patient's extensive pulmonary embolism, significant right heart strain per team give recommendations will continue patient on heparin has no ICH appreciated.  Cardiology consulted, disposition pending placement per consultant recommendations, will get serial troponins repeat labs, patient will need to be admitted.  Other etiology of patient's syncope less likely with attributable cause, pt tba.

## 2024-10-10 NOTE — PHYSICAL THERAPY INITIAL EVALUATION ADULT - IMPAIRMENTS CONTRIBUTING TO GAIT DEVIATIONS, PT EVAL
[facility] office called back asking for the sx clearance. I told them it is on the providers desk & once signed we will fax it over.She is concerned because they have requested this multiple times in advanced but have yet to receive a response & pts sx is scheduled for tomorrow 03/02/2022.   impaired balance/decreased strength

## 2024-10-10 NOTE — PROGRESS NOTE ADULT - CRITICAL CARE ATTENDING COMMENT
84 year old female with dementia, CKD, HTN, HLD, CAD s/p PCI with ADDISON 11/2019, severe AS (deferred TAVR) presents as a transfer from [facility] with submassive intermediate high risk pulmonary embolism.    Lines:   PIV    N: Dementia  - Delirium precautions  - Aricept  CV: Acute PE with RHS  Shock, obstructive iso pulmonary embolism  Not tachycardic  Severe AS  - Maintaining MAP off vasopressor support  - Heparin drip   - MAP >65  - Ongoing discussions with vascular regarding any interventions   P: AHRF iso PE  - SpO2 >92%  - On supplemental oxygen  - Aspiration precautions  GI: Elevated transaminases  - Trend LFT  - Diet  - Home PPI  R: Endometrial thickening  - Eventual GYN follow up for malignancy work up  - Trend UOP, creatinine, lytes and replete as appropriate  ID: No localizing sx  - Monitor off abx, trend fever curve, wbc  Heme: Saddle PE, bilateral DVT  - Needs hypercoag w/u, malignancy w/u    DVT: Full A/C  Full Code  HCP is daughter, but patient reports she can make decisions for herself

## 2024-10-10 NOTE — ED PROVIDER NOTE - CLINICAL SUMMARY MEDICAL DECISION MAKING FREE TEXT BOX
84F transferred from [facility] after initially presenting with syncopal episode and hypotension and found to have saddle PE with right heart strain. Patient presented from outside hospital on heparin gtt, with bolus given. Vitals stable, normotensive. On 2L NC with O2 saturation 97%. Plan for MICU and cardiology consult. Trend cardiac enzymes, continue heparin and admit.

## 2024-10-10 NOTE — ED ADULT NURSE REASSESSMENT NOTE - NS ED NURSE REASSESS COMMENT FT1
Second IV placed for additional access. Infusion initiated at outside hospital for saddle PE's, as per EMS heparin bolus was given and heparin gtt was initiated at 0125. Infusion initiated as per orders based off Heparin nomogram, pt to receive 13mL/hr according to heparin nomogram. Second RN present to confirm correct medication administration. Repeat coag to be drawn at 0725, due to initiation started from outside hospital. Stretcher locked and in lowest position, appropriate side rails up. Pt instructed to notify RN if assistance is needed.

## 2024-10-10 NOTE — ED ADULT NURSE NOTE - NSFALLHARMRISKINTERV_ED_ALL_ED
Assistance OOB with selected safe patient handling equipment if applicable/Assistance with ambulation/Communicate risk of Fall with Harm to all staff, patient, and family/Monitor gait and stability/Provide visual cue: red socks, yellow wristband, yellow gown, etc/Reinforce activity limits and safety measures with patient and family/Bed in lowest position, wheels locked, appropriate side rails in place/Call bell, personal items and telephone in reach/Instruct patient to call for assistance before getting out of bed/chair/stretcher/Non-slip footwear applied when patient is off stretcher/[place] to call system/Physically safe environment - no spills, clutter or unnecessary equipment/Purposeful Proactive Rounding/Room/bathroom lighting operational, light cord in reach

## 2024-10-10 NOTE — H&P ADULT - HISTORY OF PRESENT ILLNESS
HPI: 83 yo F with PMH of dementia, HTN, HLD, CKD3, CAD s/p PCI to RCA with ADDISON (2019) on ASA, severe AS, mild LVH, grade II LV diastolic dysfunction, mild-mod MR, mod TR, mild pulm HTN with RVSP 30mmHg (TTE 2022, normal EF 60%), presenting from [facility] to [facility] with syncope and +LOC, found to be hypotensive to 60s/40s. Reported to c/o fatigue, SOB, N/V on presentation. In ED pt /58, , SpO2 96% on NRB 15L, RR 25, temp 99.5 rectal. Labs notable for WBC 16.32, Hgb 10.6, pro- (previous pro- on 24), trop I 27, Lactate 6.1 improved to 1.6. EKG NSR with TWI in Lead III.  CTH negative for CVA, revealing chronic white matter changes and parenchymal loss with acute sinus inflammatory changes. CTA chest with findings of saddle PE extending into right and left main pulmonary arteries and into multiple b/l lobar, segmental, and subsegmental pulmonary branches with e/o right heart strain (RV/LV ratio 1.70); also with findings of heterogeneous and thickened endometrium. Pt received 2L IVF bolus, Zosyn, IV tylenol, Zofra and started on Heparin gtt. Pt with improved hemodynamics, normotensive with imrpoved tachycardia and weaning supplemental O2 requirements, transferred to [facility] for PERT evaluation.     On arrival to [facility] pt remains hemodynamically stable, /75, HR 85, RR 20, SpO2 99% on 2L NC, afebrile. On my exam pt denies complaints at this time, lying comfortably in bed. Pt states today she was at dinner when she was told she went pale and then "passed out." She endorses a recent dry cough, otherwise denies OLGUIN, SOB, fever, chills, n/v, chest pain, lower extremity swelling. She resides at [facility], where "everyone is coughing," ambulates with a walker at baseline and sleeps with two pillows. She admits to staying in her bed often and her daughter at bedside states she has complained of feeling more tired of recent. Pt denies recent travel. Pt states her mother  in her 40s from uterine cancer and her father in his 40s as well from a heart attack. Denies family h/o clotting disorders. She admits she is not up to date on routine cancer screenings.

## 2024-10-10 NOTE — H&P ADULT - NSHPLABSRESULTS_GEN_ALL_CORE
LABS:                        9.5    10.80 )-----------( 176      ( 10 Oct 2024 02:26 )             31.0     10-10    138  |  106  |  40[H]  ----------------------------<  119[H]  4.5   |  18[L]  |  1.53[H]    Ca    8.7      10 Oct 2024 02:26    TPro  6.7  /  Alb  3.5  /  TBili  0.2  /  DBili  x   /  AST  76[H]  /  ALT  56[H]  /  AlkPhos  83  10-10    PT/INR - ( 10 Oct 2024 02:26 )   PT: 12.4 sec;   INR: 1.08 ratio      Troponin 391  pro-BNP 1331    VBG 7.33/39/34/21/58% Lactate 1.2      RADIOLOGY:   ACC: [id number] EXAM:  CT ANGIO CHEST PULM ART [facility]   ORDERED BY:  GIANNA WERNER     PROCEDURE DATE:  10/10/2024      INTERPRETATION:  CLINICAL INFORMATION: Sepsis and syncope, hypotension.    COMPARISON: 6/29/2024.    CONTRAST/COMPLICATIONS:  IV Contrast: Omnipaque 350  90 cc administered   10 cc discarded  Oral Contrast: NONE  Complications: None reported at time of study completion    PROCEDURE:  CT Angiography of the Chest was performed followed by portal venous phase   imaging of the Abdomen and Pelvis.  Sagittal and coronal reformats were performed as well as 3D (MIP)   reconstructions.    IMPRESSION:  Saddle pulmonary embolus extending into the right left main pulmonary   arteries, and into multiple bilateral lobar, segmental, and subsegmental   pulmonary branches as above. Evidence of right heart strain.    As on the prior study of 6/20/2024, the endometrium appears heterogeneous   and thickened. This may be further assessed with nonemergent ultrasound.   Consider gynecologic consultation if clinically warranted.    Findings were discussed with Dr. Brown 10/10/2024 1:17 AM by Dr. Mercado   with read back confirmation.    --- End of Report ---    < from: TTE W or WO Ultrasound Enhancing Agent (10.10.24 @ 03:03) >      CONCLUSIONS:      1. The interventricular septum is flattened in diastole ('D' shaped left ventricle) consistent with right ventricular volume overload. Left ventricular systolic function is normal with an ejection fraction visually estimated at 60 to 65 %.   2. Enlarged right ventricular cavity size and reduced right ventricular systolic function.   3. No prior echocardiogram is available for comparison.   4. Right ventricular systolic function is reduced with apical sparing consistent with acute pulmonary embolism (Grace's sign).   5. The inferior vena cava is dilated (dilated >2.1cm) with abnormal inspiratory collapse (abnormal <50%) consistent with elevated right atrial pressure (~15, range 10-20mmHg).

## 2024-10-10 NOTE — H&P ADULT - NSICDXFAMILYHX_GEN_ALL_CORE_FT
FAMILY HISTORY:  Father  Still living? No  Family history of heart attack, Age at diagnosis: 41-50    Mother  Still living? No  Family history of uterine cancer, Age at diagnosis: 41-50

## 2024-10-10 NOTE — H&P ADULT - NSHPPHYSICALEXAM_GEN_ALL_CORE
General: No acute distress.    HEENT: Pupils equal, reactive to light.  Symmetric.  PULM: Clear to auscultation bilaterally, no significant sputum production  CVS: Regular rate and rhythm, no murmurs, rubs, or gallops  ABD: Soft, nondistended, nontender, normoactive bowel sounds, no masses  EXT: No edema, nontender  SKIN: Warm and well perfused, no rashes noted.  NEURO: Alert, oriented, interactive, nonfocal ICU Vital Signs Last 24 Hrs  T(C): 36.3 (10 Oct 2024 05:45), Max: 36.4 (10 Oct 2024 05:00)  T(F): 97.4 (10 Oct 2024 05:45), Max: 97.6 (10 Oct 2024 05:00)  HR: 74 (10 Oct 2024 07:00) (74 - 85)  BP: 124/64 (10 Oct 2024 07:00) (111/69 - 167/81)  BP(mean): 89 (10 Oct 2024 07:00) (80 - 116)  RR: 21 (10 Oct 2024 07:00) (18 - 21)  SpO2: 97% (10 Oct 2024 07:00) (91% - 100%)    O2 Parameters below as of 10 Oct 2024 05:45  Patient On (Oxygen Delivery Method): nasal cannula  O2 Flow (L/min): 2      General: No acute distress.    HEENT: Pupils equal, reactive to light.  Symmetric.  PULM: Clear to auscultation bilaterally, no significant sputum production  CVS: Regular rate and rhythm, no murmurs, rubs, or gallops  ABD: Soft, nondistended, nontender, normoactive bowel sounds, no masses  EXT: No edema, nontender  SKIN: Warm and well perfused, no rashes noted.  NEURO: Alert, oriented, interactive, nonfocal

## 2024-10-10 NOTE — PHYSICAL THERAPY INITIAL EVALUATION ADULT - PERTINENT HX OF CURRENT PROBLEM, REHAB EVAL
83 yo F with PMH of dementia, HTN, HLD, CKD3, CAD s/p PCI to RCA with ADDISON (11/2019) on ASA, severe AS, mild LVH, grade II LV diastolic dysfunction, mild-mod MR, mod TR, mild pulm HTN with RVSP 30mmHg (TTE 5/2022, normal EF 60%), presenting from [facility] to [facility] with syncope and +LOC, found to be hypotensive to 60s/40s. Reported to c/o fatigue, SOB, N/V on presentation. In ED pt /58, , SpO2 96% on NRB 15L, RR 25, temp 99.5 rectal. Labs notable for WBC 16.32, Hgb 10.6, pro- (previous pro- on 6/29/24), trop I 27, Lactate 6.1 improved to 1.6. EKG NSR with TWI in Lead III.  CTH negative for CVA, revealing chronic white matter changes and parenchymal loss with acute sinus inflammatory changes. CTA chest with findings of saddle PE extending into right and left main pulmonary arteries and into multiple b/l lobar, segmental, and subsegmental pulmonary branches with e/o right heart strain (RV/LV ratio 1.70); also with findings of heterogeneous and thickened endometrium. Pt with improved hemodynamics, normotensive with imrpoved tachycardia and weaning supplemental O2 requirements, transferred to [facility] for PERT evaluation.  Pt states today she was at dinner when she was told she went pale and then "passed out." She endorses a recent dry cough, otherwise denies OLGUIN, SOB, fever, chills, n/v, chest pain, lower extremity swelling. She resides at [facility], where "everyone is coughing," ambulates with a walker at baseline and sleeps with two pillows. She admits to staying in her bed often and her daughter at bedside states she has complained of feeling more tired of recent. Pt denies recent travel. Denies family h/o clotting disorders. She admits she is not up to date on routine cancer screenings.

## 2024-10-10 NOTE — CONSULT NOTE ADULT - SUBJECTIVE AND OBJECTIVE BOX
Cardiology Consult Note   [Please check [email or web address]: [email or web address] for cardiology service schedule and contact information]    HPI: 83 yo F with PMH of dementia, HTN, HLD, CKD3, CAD s/p PCI to RCA with ADDISON (11/2019) on ASA, severe AS, mild LVH, grade II LV diastolic dysfunction, mild-mod MR, mod TR, mild pulm HTN with RVSP 30mmHg (TTE 5/2022, normal EF 60%), presenting from [facility] to [facility] with syncope and +LOC, found to be hypotensive to 60s/40s. Reported to c/o fatigue, SOB, N/V on presentation. In ED pt /58, , SpO2 96% on NRB 15L, RR 25, temp 99.5 rectal. Labs notable for WBC 16.32, Hgb 10.6, pro- (previous pro- on 6/29/24), HS trop I 27, Lactate 6.1 improved to 1.6. EKG NSR with TWI in Lead III.  CTH negative for CVA, revealing chronic white matter changes and parenchymal loss with acute sinus inflammatory changes. CTA chest with findings of saddle PE extending into right and left main pulmonary arteries and into multiple b/l lobar, segmental, and subsegmental pulmonary branches with e/o right heart strain (RV/LV ratio 1.70); also with findings of heterogeneous and thickened endometrium. Pt received 2L IVF bolus, Zosyn, IV tylenol, Zofran and started on Heparin gtt. Pt with improved hemodynamics, normotensive with imrpoved tachycardia and weaning supplemental O2 requirements, transferred to [facility] for PERT evaluation.     Pt seen and examined at bedside. Notes resolution of all symptoms and currently feels well while laying in bed. Currently denies dizziness, light headedness, chest pain, SOB, orthopnea palpitations, n/v.    In the ED at [facility], labs notable for rising troponin and CKMB. Pt otherwise hemodynamically stable and saturating well on 2Lnc. TTE with  IV septum flattening, enarged RV size, reduced RV function with apical sparing (Grace's sign), and dilated IVC. LV function visually normal with estimated EF 60-65%.       PAST MEDICAL & SURGICAL HISTORY:  Dementia      Aortic stenosis        FAMILY HISTORY:    SOCIAL HISTORY:  unchanged    MEDICATIONS:  heparin   Injectable 6000 Unit(s) IV Push every 6 hours PRN  heparin   Injectable 3000 Unit(s) IV Push every 6 hours PRN  heparin  Infusion.  Unit(s)/Hr IV Continuous <Continuous>                    -------------------------------------------------------------------------------------------  PHYSICAL EXAM:  T(C): 36.3 (10-10-24 @ 01:57), Max: 36.3 (10-10-24 @ 01:57)  HR: 80 (10-10-24 @ 02:25) (80 - 85)  BP: 111/69 (10-10-24 @ 02:16) (111/69 - 117/75)  RR: 20 (10-10-24 @ 02:25) (20 - 20)  SpO2: 98% (10-10-24 @ 02:25) (91% - 99%)  Wt(kg): --  I&O's Summary      GENERAL: NAD  HEAD: Atraumatic, Normocephalic.  ENT: Moist mucous membranes.  NECK: Supple, No JVD.  CHEST/LUNG: Clear to auscultation bilaterally; No rales, rhonchi, wheezing, or rubs. Unlabored respirations.  HEART: Regular rate and rhythm; No murmurs, rubs, or gallops.  ABDOMEN: Bowel sounds present; Soft, Nontender, Nondistended.   EXTREMITIES:  2+ Peripheral Pulses, brisk capillary refill. No clubbing, cyanosis, or edema.    -------------------------------------------------------------------------------------------  LABS:                          9.5    10.80 )-----------( 176      ( 10 Oct 2024 02:26 )             31.0     10-10    138  |  106  |  40[H]  ----------------------------<  119[H]  4.5   |  18[L]  |  1.53[H]    Ca    8.7      10 Oct 2024 02:26    TPro  6.7  /  Alb  3.5  /  TBili  0.2  /  DBili  x   /  AST  76[H]  /  ALT  56[H]  /  AlkPhos  83  10-10    PT/INR - ( 10 Oct 2024 02:26 )   PT: 12.4 sec;   INR: 1.08 ratio         PTT - ( 10 Oct 2024 02:26 )  PTT:>200.0 sec  CARDIAC MARKERS ( 10 Oct 2024 03:41 )  359 ng/L / x     / x     / x     / x     / x      CARDIAC MARKERS ( 10 Oct 2024 02:26 )  391 ng/L / x     / x     / x     / x     / x                  -------------------------------------------------------------------------------------------  Cardiovascular Diagnostic Testing:    ECG:     Echo:     Stress Testing:    Cath:    -------------------------------------------------------------------------------------------

## 2024-10-10 NOTE — PATIENT PROFILE ADULT - FALL HARM RISK - HARM RISK INTERVENTIONS

## 2024-10-11 DIAGNOSIS — F03.90 UNSPECIFIED DEMENTIA, UNSPECIFIED SEVERITY, WITHOUT BEHAVIORAL DISTURBANCE, PSYCHOTIC DISTURBANCE, MOOD DISTURBANCE, AND ANXIETY: ICD-10-CM

## 2024-10-11 DIAGNOSIS — I35.0 NONRHEUMATIC AORTIC (VALVE) STENOSIS: ICD-10-CM

## 2024-10-11 DIAGNOSIS — I26.92 SADDLE EMBOLUS OF PULMONARY ARTERY WITHOUT ACUTE COR PULMONALE: ICD-10-CM

## 2024-10-11 DIAGNOSIS — J96.01 ACUTE RESPIRATORY FAILURE WITH HYPOXIA: ICD-10-CM

## 2024-10-11 DIAGNOSIS — R93.89 ABNORMAL FINDINGS ON DIAGNOSTIC IMAGING OF OTHER SPECIFIED BODY STRUCTURES: ICD-10-CM

## 2024-10-11 DIAGNOSIS — Z29.9 ENCOUNTER FOR PROPHYLACTIC MEASURES, UNSPECIFIED: ICD-10-CM

## 2024-10-11 DIAGNOSIS — N18.30 CHRONIC KIDNEY DISEASE, STAGE 3 UNSPECIFIED: ICD-10-CM

## 2024-10-11 DIAGNOSIS — I25.10 ATHEROSCLEROTIC HEART DISEASE OF NATIVE CORONARY ARTERY WITHOUT ANGINA PECTORIS: ICD-10-CM

## 2024-10-11 DIAGNOSIS — K21.9 GASTRO-ESOPHAGEAL REFLUX DISEASE WITHOUT ESOPHAGITIS: ICD-10-CM

## 2024-10-11 LAB
ALBUMIN SERPL ELPH-MCNC: 3.5 G/DL — SIGNIFICANT CHANGE UP (ref 3.3–5)
ALP SERPL-CCNC: 75 U/L — SIGNIFICANT CHANGE UP (ref 40–120)
ALT FLD-CCNC: 36 U/L — SIGNIFICANT CHANGE UP (ref 10–45)
ANION GAP SERPL CALC-SCNC: 13 MMOL/L — SIGNIFICANT CHANGE UP (ref 5–17)
APTT BLD: 80 SEC — HIGH (ref 24.5–35.6)
APTT BLD: 81.8 SEC — HIGH (ref 24.5–35.6)
AST SERPL-CCNC: 32 U/L — SIGNIFICANT CHANGE UP (ref 10–40)
B2 GLYCOPROT1 IGA SER QL: <2 U/ML — SIGNIFICANT CHANGE UP
B2 GLYCOPROT1 IGG SER-ACNC: <1.4 U/ML — SIGNIFICANT CHANGE UP
B2 GLYCOPROT1 IGM SER-ACNC: 1.5 U/ML — SIGNIFICANT CHANGE UP
BILIRUB SERPL-MCNC: 0.3 MG/DL — SIGNIFICANT CHANGE UP (ref 0.2–1.2)
BUN SERPL-MCNC: 33 MG/DL — HIGH (ref 7–23)
CALCIUM SERPL-MCNC: 9 MG/DL — SIGNIFICANT CHANGE UP (ref 8.4–10.5)
CARDIOLIPIN IGM SER-MCNC: 2.2 MPL U/ML — SIGNIFICANT CHANGE UP
CARDIOLIPIN IGM SER-MCNC: <1.6 GPL U/ML — SIGNIFICANT CHANGE UP
CHLORIDE SERPL-SCNC: 110 MMOL/L — HIGH (ref 96–108)
CO2 SERPL-SCNC: 19 MMOL/L — LOW (ref 22–31)
CREAT SERPL-MCNC: 1.49 MG/DL — HIGH (ref 0.5–1.3)
DEPRECATED CARDIOLIPIN IGA SER: <2 APL U/ML — SIGNIFICANT CHANGE UP
DRVVT RATIO: 0.99 RATIO — SIGNIFICANT CHANGE UP (ref 0–1.21)
DRVVT SCREEN TO CONFIRM RATIO: SIGNIFICANT CHANGE UP
EGFR: 34 ML/MIN/1.73M2 — LOW
FACT V ACT/NOR PPP: 116 % — SIGNIFICANT CHANGE UP (ref 50–150)
FACT VIII ACT/NOR PPP: 226 % — HIGH (ref 60–125)
GLUCOSE SERPL-MCNC: 96 MG/DL — SIGNIFICANT CHANGE UP (ref 70–99)
HCT VFR BLD CALC: 32.3 % — LOW (ref 34.5–45)
HCYS SERPL-MCNC: 18.5 UMOL/L — HIGH
HGB BLD-MCNC: 10 G/DL — LOW (ref 11.5–15.5)
INR BLD: 1.06 RATIO — SIGNIFICANT CHANGE UP (ref 0.85–1.16)
MAGNESIUM SERPL-MCNC: 2.4 MG/DL — SIGNIFICANT CHANGE UP (ref 1.6–2.6)
MCHC RBC-ENTMCNC: 28.2 PG — SIGNIFICANT CHANGE UP (ref 27–34)
MCHC RBC-ENTMCNC: 31 GM/DL — LOW (ref 32–36)
MCV RBC AUTO: 91 FL — SIGNIFICANT CHANGE UP (ref 80–100)
NORMALIZED SCT PPP-RTO: 0.87 RATIO — SIGNIFICANT CHANGE UP (ref 0–1.16)
NORMALIZED SCT PPP-RTO: SIGNIFICANT CHANGE UP
NRBC # BLD: 0 /100 WBCS — SIGNIFICANT CHANGE UP (ref 0–0)
PHOSPHATE SERPL-MCNC: 3.1 MG/DL — SIGNIFICANT CHANGE UP (ref 2.5–4.5)
PLATELET # BLD AUTO: 165 K/UL — SIGNIFICANT CHANGE UP (ref 150–400)
POTASSIUM SERPL-MCNC: 4 MMOL/L — SIGNIFICANT CHANGE UP (ref 3.5–5.3)
POTASSIUM SERPL-SCNC: 4 MMOL/L — SIGNIFICANT CHANGE UP (ref 3.5–5.3)
PROT SERPL-MCNC: 6.7 G/DL — SIGNIFICANT CHANGE UP (ref 6–8.3)
PROTHROM AB SERPL-ACNC: 12.1 SEC — SIGNIFICANT CHANGE UP (ref 9.9–13.4)
RBC # BLD: 3.55 M/UL — LOW (ref 3.8–5.2)
RBC # FLD: 15.4 % — HIGH (ref 10.3–14.5)
SODIUM SERPL-SCNC: 142 MMOL/L — SIGNIFICANT CHANGE UP (ref 135–145)
WBC # BLD: 9.82 K/UL — SIGNIFICANT CHANGE UP (ref 3.8–10.5)
WBC # FLD AUTO: 9.82 K/UL — SIGNIFICANT CHANGE UP (ref 3.8–10.5)

## 2024-10-11 PROCEDURE — 99233 SBSQ HOSP IP/OBS HIGH 50: CPT | Mod: GC

## 2024-10-11 RX ORDER — BENZONATATE 150 MG/1
100 CAPSULE ORAL THREE TIMES A DAY
Refills: 0 | Status: DISCONTINUED | OUTPATIENT
Start: 2024-10-11 | End: 2024-10-15

## 2024-10-11 RX ORDER — METOPROLOL TARTRATE 50 MG
25 TABLET ORAL DAILY
Refills: 0 | Status: DISCONTINUED | OUTPATIENT
Start: 2024-10-11 | End: 2024-10-15

## 2024-10-11 RX ORDER — APIXABAN 5 MG/1
10 TABLET, FILM COATED ORAL EVERY 12 HOURS
Refills: 0 | Status: DISCONTINUED | OUTPATIENT
Start: 2024-10-11 | End: 2024-10-15

## 2024-10-11 RX ADMIN — APIXABAN 10 MILLIGRAM(S): 5 TABLET, FILM COATED ORAL at 21:01

## 2024-10-11 RX ADMIN — BENZONATATE 100 MILLIGRAM(S): 150 CAPSULE ORAL at 21:01

## 2024-10-11 RX ADMIN — Medication 1100 UNIT(S)/HR: at 02:25

## 2024-10-11 RX ADMIN — Medication 5 MILLIGRAM(S): at 21:01

## 2024-10-11 RX ADMIN — BENZONATATE 100 MILLIGRAM(S): 150 CAPSULE ORAL at 15:30

## 2024-10-11 RX ADMIN — PANTOPRAZOLE SODIUM 40 MILLIGRAM(S): 40 TABLET, DELAYED RELEASE ORAL at 08:58

## 2024-10-11 RX ADMIN — Medication 1100 UNIT(S)/HR: at 09:55

## 2024-10-11 RX ADMIN — Medication 1100 UNIT(S)/HR: at 09:16

## 2024-10-11 RX ADMIN — Medication 81 MILLIGRAM(S): at 12:40

## 2024-10-11 RX ADMIN — Medication 1100 UNIT(S)/HR: at 18:32

## 2024-10-11 NOTE — PROGRESS NOTE ADULT - PROBLEM SELECTOR PLAN 3
- endometrial heterogenous thickening on CT Abdomen   - Transvaginal U/S ordered  - May need future Gyn evaluation given family history and now PE

## 2024-10-11 NOTE — OCCUPATIONAL THERAPY INITIAL EVALUATION ADULT - NSOTDISCHREC_GEN_A_CORE
No skilled OT needs Return to [facility] with prior level of assist/supervision, Pt owns RW and all DME needed./No skilled OT needs

## 2024-10-11 NOTE — OCCUPATIONAL THERAPY INITIAL EVALUATION ADULT - GENERAL OBSERVATIONS, REHAB EVAL
Upon entry, patient semi-supine in bed, patient agreeable to OT eval, cleared for OT evaluation as per SELWYN Jacobo/Ludy

## 2024-10-11 NOTE — PROGRESS NOTE ADULT - SUBJECTIVE AND OBJECTIVE BOX
INTERVAL HPI/OVERNIGHT EVENTS:  Pt seen and examined at bedside. No acute overnight events or complaints.    VITAL SIGNS:  T(F): 98 (10-11-24 @ 13:18)  HR: 88 (10-11-24 @ 13:18)  BP: 113/73 (10-11-24 @ 13:18)  RR: 18 (10-11-24 @ 13:18)  SpO2: 98% (10-11-24 @ 13:18)  Wt(kg): --    PHYSICAL EXAM:    GENERAL: Laying comfortably, NAD  EYES: EOMI, PERRL, no scleral icterus  NECK: No JVD  LUNG: Clear to auscultation bilaterally; No wheeze, crackles or rhonci  HEART: Regular rate and rhythm; No murmurs, rubs, or gallops  ABDOMEN: Soft, Nontender, Nondistended  EXTREMITIES:  No LE edema, 2+ Peripheral Pulses, No clubbing, cyanosis, or edema  PSYCH: AAOx3  NEUROLOGY: non-focal, strength 5/5 in all extremities, sensation intact  SKIN: No rashes or lesions    MEDICATIONS  (STANDING):  aspirin  chewable 81 milliGRAM(s) Oral daily  benzonatate 100 milliGRAM(s) Oral three times a day  donepezil 5 milliGRAM(s) Oral at bedtime  heparin  Infusion. 1000 Unit(s)/Hr (10 mL/Hr) IV Continuous <Continuous>  metoprolol succinate ER 25 milliGRAM(s) Oral daily  pantoprazole    Tablet 40 milliGRAM(s) Oral before breakfast  polyethylene glycol 3350 17 Gram(s) Oral two times a day    MEDICATIONS  (PRN):      Allergies    No Known Allergies    Intolerances        LABS:                        10.0   9.82  )-----------( 165      ( 11 Oct 2024 00:01 )             32.3     10-11    142  |  110[H]  |  33[H]  ----------------------------<  96  4.0   |  19[L]  |  1.49[H]    Ca    9.0      11 Oct 2024 00:01  Phos  3.1     10-11  Mg     2.4     10-11    TPro  6.7  /  Alb  3.5  /  TBili  0.3  /  DBili  x   /  AST  32  /  ALT  36  /  AlkPhos  75  10-11    PT/INR - ( 11 Oct 2024 01:24 )   PT: 12.1 sec;   INR: 1.06 ratio         PTT - ( 11 Oct 2024 08:28 )  PTT:80.0 sec  Urinalysis Basic - ( 11 Oct 2024 00:01 )    Color: x / Appearance: x / SG: x / pH: x  Gluc: 96 mg/dL / Ketone: x  / Bili: x / Urobili: x   Blood: x / Protein: x / Nitrite: x   Leuk Esterase: x / RBC: x / WBC x   Sq Epi: x / Non Sq Epi: x / Bacteria: x        RADIOLOGY & ADDITIONAL TESTS:  Reviewed      ******************  Authored By: Jack Neff MD PGY1  Internal Medicine  MS Teams Preferred  ******************   INTERVAL HPI/OVERNIGHT EVENTS:  Pt seen and examined at bedside. No acute overnight events or complaints. Patient feels well this AM. She was able to ambulate with her walker without requiring supplemental oxygen. She notes a slight nonproductive cough.    VITAL SIGNS:  T(F): 98 (10-11-24 @ 13:18)  HR: 88 (10-11-24 @ 13:18)  BP: 113/73 (10-11-24 @ 13:18)  RR: 18 (10-11-24 @ 13:18)  SpO2: 98% (10-11-24 @ 13:18)  Wt(kg): --    PHYSICAL EXAM:    GENERAL: Laying comfortably, NAD  EYES: EOMI, PERRL, no scleral icterus  NECK: No JVD  LUNG: Clear to auscultation bilaterally; No wheeze, crackles or rhonci  HEART: Regular rate and rhythm; No murmurs, rubs, or gallops  ABDOMEN: Soft, Nontender, Nondistended  EXTREMITIES:  No LE edema, 2+ Peripheral Pulses, No clubbing, cyanosis, or edema  PSYCH: AAOx3  NEUROLOGY: non-focal, strength 5/5 in all extremities, sensation intact  SKIN: No rashes or lesions    MEDICATIONS  (STANDING):  aspirin  chewable 81 milliGRAM(s) Oral daily  benzonatate 100 milliGRAM(s) Oral three times a day  donepezil 5 milliGRAM(s) Oral at bedtime  heparin  Infusion. 1000 Unit(s)/Hr (10 mL/Hr) IV Continuous <Continuous>  metoprolol succinate ER 25 milliGRAM(s) Oral daily  pantoprazole    Tablet 40 milliGRAM(s) Oral before breakfast  polyethylene glycol 3350 17 Gram(s) Oral two times a day    MEDICATIONS  (PRN):      Allergies    No Known Allergies    Intolerances        LABS:                        10.0   9.82  )-----------( 165      ( 11 Oct 2024 00:01 )             32.3     10-11    142  |  110[H]  |  33[H]  ----------------------------<  96  4.0   |  19[L]  |  1.49[H]    Ca    9.0      11 Oct 2024 00:01  Phos  3.1     10-11  Mg     2.4     10-11    TPro  6.7  /  Alb  3.5  /  TBili  0.3  /  DBili  x   /  AST  32  /  ALT  36  /  AlkPhos  75  10-11    PT/INR - ( 11 Oct 2024 01:24 )   PT: 12.1 sec;   INR: 1.06 ratio         PTT - ( 11 Oct 2024 08:28 )  PTT:80.0 sec  Urinalysis Basic - ( 11 Oct 2024 00:01 )    Color: x / Appearance: x / SG: x / pH: x  Gluc: 96 mg/dL / Ketone: x  / Bili: x / Urobili: x   Blood: x / Protein: x / Nitrite: x   Leuk Esterase: x / RBC: x / WBC x   Sq Epi: x / Non Sq Epi: x / Bacteria: x        RADIOLOGY & ADDITIONAL TESTS:  Reviewed      ******************  Authored By: Jack Neff MD PGY1  Internal Medicine  MS Teams Preferred  ******************

## 2024-10-11 NOTE — PROGRESS NOTE ADULT - PROBLEM SELECTOR PLAN 2
Likely 2/2 saddle pulmonary embolism and RV compromise   - originally placed on NRB now weaned to RA, maintain SpO2 >92%    - management for PE as above   - Aspiration precautions Likely 2/2 saddle pulmonary embolism and RV compromise   - originally placed on NRB now weaned to RA, maintain SpO2 >92%    - management for PE as above   - Aspiration precautions    RESOLVING

## 2024-10-11 NOTE — OCCUPATIONAL THERAPY INITIAL EVALUATION ADULT - PERTINENT HX OF CURRENT PROBLEM, REHAB EVAL
HPI: 83 yo F with PMH of dementia, HTN, HLD, CKD3, CAD s/p PCI to RCA with ADDISON (2019) on ASA, severe AS, mild LVH, grade II LV diastolic dysfunction, mild-mod MR, mod TR, mild pulm HTN with RVSP 30mmHg (TTE 2022, normal EF 60%), presenting from [facility] to [facility] with syncope and +LOC, found to be hypotensive to 60s/40s. Reported to c/o fatigue, SOB, N/V on presentation. In ED pt /58, , SpO2 96% on NRB 15L, RR 25, temp 99.5 rectal. Labs notable for WBC 16.32, Hgb 10.6, pro- (previous pro- on 24), trop I 27, Lactate 6.1 improved to 1.6. EKG NSR with TWI in Lead III.  CTH negative for CVA, revealing chronic white matter changes and parenchymal loss with acute sinus inflammatory changes. CTA chest with findings of saddle PE extending into right and left main pulmonary arteries and into multiple b/l lobar, segmental, and subsegmental pulmonary branches with e/o right heart strain (RV/LV ratio 1.70); also with findings of heterogeneous and thickened endometrium. Pt received 2L IVF bolus, Zosyn, IV tylenol, Zofra and started on Heparin gtt. Pt with improved hemodynamics, normotensive with imrpoved tachycardia and weaning supplemental O2 requirements, transferred to [facility] for PERT evaluation. On arrival to [facility] pt remains hemodynamically stable, /75, HR 85, RR 20, SpO2 99% on 2L NC, afebrile. On my exam pt denies complaints at this time, lying comfortably in bed. Pt states today she was at dinner when she was told she went pale and then "passed out." She endorses a recent dry cough, otherwise denies OLGUIN, SOB, fever, chills, n/v, chest pain, lower extremity swelling. She resides at [facility], where "everyone is coughing," ambulates with a walker at baseline and sleeps with two pillows. She admits to staying in her bed often and her daughter at bedside states she has complained of feeling more tired of recent. Pt denies recent travel. Pt states her mother  in her 40s from uterine cancer and her father in his 40s as well from a heart attack.  B/L LE doppler-Right lower extremity acute DVT, above and below the knee, involving the right popliteal vein and tibioperoneal trunk vein. Also, there is superficial venous thrombosis of the right small saphenous vein. Left lower extremity DVT, above and below the knee, involving the left popliteal vein, tibioperoneal trunk vein, posterior tibial vein, solid-appearing, and gastrocnemius vein.  CT angio chest-Saddle pulmonary embolus extending into the right left main pulmonary arteries, and into multiple bilateral lobar, segmental, and subsegmental pulmonary branches as above. Evidence of right heart strain. As on the prior study of 2024, the endometrium appears heterogeneous and thickened. This may be further assessed with nonemergent ultrasound. Consider gynecologic consultation if clinically warranted.  CT head-No acute intracranial hemorrhage, mass effect, or midline shift. Chronic microangiopathic white matter changes and parenchymal volume loss. Acute sinus inflammatory changes.

## 2024-10-11 NOTE — PROGRESS NOTE ADULT - SUBJECTIVE AND OBJECTIVE BOX
INTERVAL HPI/OVERNIGHT EVENTS: No overnight events    83 yo F with PMH of dementia, HTN, HLD, CKD3, CAD s/p PCI to RCA with ADDISON (11/2019) on ASA, severe AS, mild LVH, grade II LV diastolic dysfunction, mild-mod MR, mod TR, mild pulm HTN with RVSP 30mmHg (TTE 5/2022, normal EF 60%), presenting from [facility] to [facility] with syncope and +LOC, found to be hypotensive to 60s/40s. Reported to c/o fatigue, SOB, N/V on presentation. In ED pt /58, , SpO2 96% on NRB 15L, RR 25, temp 99.5 rectal. Labs notable for WBC 16.32, Hgb 10.6, pro- (previous pro- on 6/29/24), trop I 27, Lactate 6.1 improved to 1.6. EKG NSR with TWI in Lead III.  CTH negative for CVA, revealing chronic white matter changes and parenchymal loss with acute sinus inflammatory changes. CTA chest with findings of saddle PE extending into right and left main pulmonary arteries and into multiple b/l lobar, segmental, and subsegmental pulmonary branches with e/o right heart strain (RV/LV ratio 1.70); also with findings of heterogeneous and thickened endometrium. Pt received 2L IVF bolus, Zosyn, IV tylenol, Zofra and started on Heparin gtt. Pt with improved hemodynamics, normotensive with imrpoved tachycardia and weaning supplemental O2 requirements, transferred to [facility] for PERT evaluation.     VITAL SIGNS:  ICU Vital Signs Last 24 Hrs  T(C): 36.2 (10 Oct 2024 08:00), Max: 37.5 (09 Oct 2024 19:16)  T(F): 97.2 (10 Oct 2024 08:00), Max: 99.5 (09 Oct 2024 19:16)  HR: 71 (10 Oct 2024 10:00) (70 - 112)  BP: 126/58 (10 Oct 2024 10:00) (111/60 - 167/81)  BP(mean): 83 (10 Oct 2024 10:00) (80 - 116)  ABP: --  ABP(mean): --  RR: 20 (10 Oct 2024 10:00) (18 - 25)  SpO2: 97% (10 Oct 2024 10:00) (91% - 100%)    O2 Parameters below as of 10 Oct 2024 08:00  Patient On (Oxygen Delivery Method): nasal cannula  O2 Flow (L/min):       PHYSICAL EXAM:  GENERAL: Laying comfortably, NAD  EYES: EOMI, PERRL, no scleral icterus  NECK: No JVD  LUNG: Clear to auscultation bilaterally; No wheeze, crackles or rhonci  HEART: Regular rate and rhythm; No murmurs, rubs, or gallops  ABDOMEN: Soft, Nontender, Nondistended  EXTREMITIES:  No LE edema, 2+ Peripheral Pulses, No clubbing, cyanosis, or edema  PSYCH: AAOx3  NEUROLOGY: non-focal, strength 5/5 in all extremities, sensation intact  SKIN: No rashes or lesions              MEDICATIONS:  MEDICATIONS  (STANDING):  aspirin  chewable 81 milliGRAM(s) Oral daily  donepezil 5 milliGRAM(s) Oral at bedtime  heparin  Infusion. 1000 Unit(s)/Hr (10 mL/Hr) IV Continuous <Continuous>  pantoprazole    Tablet 40 milliGRAM(s) Oral before breakfast    MEDICATIONS  (PRN):      ALLERGIES:  Allergies    No Known Allergies    Intolerances        LABS:                                            10.0   9.82  )-----------( 165      ( 11 Oct 2024 00:01 )             32.3     Hgb Trend: 10.0<--, 9.6<--, 10.2<--, 9.5<--, 10.6<--  10-11    142  |  110[H]  |  33[H]  ----------------------------<  96  4.0   |  19[L]  |  1.49[H]    Ca    9.0      11 Oct 2024 00:01  Phos  3.1     10-11  Mg     2.4     10-11    TPro  6.7  /  Alb  3.5  /  TBili  0.3  /  DBili  x   /  AST  32  /  ALT  36  /  AlkPhos  75  10-11    Creatinine Trend: 1.49<--, 1.49<--, 1.53<--, 1.53<--, 1.90<--  PT/INR - ( 11 Oct 2024 01:24 )   PT: 12.1 sec;   INR: 1.06 ratio         PTT - ( 11 Oct 2024 01:24 )  PTT:81.8 sec      Urinalysis Basic - ( 11 Oct 2024 00:01 )    Color: x / Appearance: x / SG: x / pH: x  Gluc: 96 mg/dL / Ketone: x  / Bili: x / Urobili: x   Blood: x / Protein: x / Nitrite: x   Leuk Esterase: x / RBC: x / WBC x   Sq Epi: x / Non Sq Epi: x / Bacteria: x      CAPILLARY BLOOD GLUCOSE      POCT Blood Glucose.: 217 mg/dL (09 Oct 2024 18:42)      RADIOLOGY & ADDITIONAL TESTS: Reviewed.

## 2024-10-11 NOTE — CHART NOTE - NSCHARTNOTEFT_GEN_A_CORE
MAR MICU TRANSFER NOTE    Please refer to MICU transfer note for full details.    Briefly, this is an 84F from [place] w dementia, HTN, HLD, CKD3, CAD s/p ADDISON on ASA, severe AS, HFpEF, initially p/w syncope/LOC hypotensive to [facility] where she was found with obstructive shock 2/2 high risk saddle PE w b/l extension, RHS, b/l DVTs, transferred to [facility] for PERT evaluation, admitted to [facility] MICU for monitoring. Pt now stable on hep gtt, ambulating HDS on RA back to baseline. Incidentally found w endometrial thickening concerning given maternal h/o uterine cancer. Vascular cardiology following for possible nonemergent thrombectomy; pt deferred TAVR, currently full code but deferring to daughter for rest of GoC.    Vital Signs Last 24 Hrs  T(C): 36.6 (11 Oct 2024 09:44), Max: 37 (11 Oct 2024 08:00)  T(F): 97.8 (11 Oct 2024 09:44), Max: 98.6 (11 Oct 2024 08:00)  HR: 72 (11 Oct 2024 09:44) (68 - 79)  BP: 127/72 (11 Oct 2024 09:44) (111/56 - 183/76)  BP(mean): 101 (11 Oct 2024 08:00) (80 - 114)  RR: 18 (11 Oct 2024 09:44) (18 - 29)  SpO2: 98% (11 Oct 2024 09:44) (93% - 99%)    Parameters below as of 11 Oct 2024 09:44  Patient On (Oxygen Delivery Method): room air    I&O's Summary    10 Oct 2024 07:01  -  11 Oct 2024 07:00  --------------------------------------------------------  IN: 223 mL / OUT: 200 mL / NET: 23 mL    11 Oct 2024 07:01  -  11 Oct 2024 09:49  --------------------------------------------------------  IN: 22 mL / OUT: 100 mL / NET: -78 mL    Allergies  No Known Allergies  Intolerances    MEDICATIONS  (STANDING):  aspirin  chewable 81 milliGRAM(s) Oral daily  donepezil 5 milliGRAM(s) Oral at bedtime  heparin  Infusion. 1000 Unit(s)/Hr (10 mL/Hr) IV Continuous <Continuous>  metoprolol succinate ER 25 milliGRAM(s) Oral daily  pantoprazole    Tablet 40 milliGRAM(s) Oral before breakfast  polyethylene glycol 3350 17 Gram(s) Oral two times a day    MEDICATIONS  (PRN):                      10.0   9.82  )-----------( 165      ( 11 Oct 2024 00:01 )             32.3     10-11  142  |  110[H]  |  33[H]  ----------------------------<  96  4.0   |  19[L]  |  1.49[H]    Ca    9.0      11 Oct 2024 00:01  Phos  3.1     10-11  Mg     2.4     10-11  TPro  6.7  /  Alb  3.5  /  TBili  0.3  /  DBili  x   /  AST  32  /  ALT  36  /  AlkPhos  75  10-11  PT/INR - ( 11 Oct 2024 01:24 )   PT: 12.1 sec;   INR: 1.06 ratio    PTT - ( 11 Oct 2024 08:28 )  PTT:80.0 sec    ASSESSMENT & PLAN:   84F from [place] w dementia, HTN, HLD, CKD3, CAD s/p ADDISON on ASA, severe AS, HFpEF, initially p/w syncope/LOC hypotensive to [facility] where she was found with obstructive shock 2/2 high risk saddle PE w b/l extension, RHS, b/l DVTs. Course c/b incidental endometrial thickening in setting of FHx of uterine ca.     FOR FOLLOW UP:  [] f/u vasc card plan for thrombectomy / timing of DOAC transition  [] c/w heparin gtt  [] f/u hypercoag w/u  [] c/w GoC
MICU Transfer Note    Transfer from: MICU    Transfer to: (x ) Medicine    (  ) Telemetry     (   ) RCU        (    ) Palliative         (   ) Stroke Unit          (   ) __________________    Accepting physican: Dr. Angulo    85 yo F with PMH of dementia, HTN, HLD, CKD3, CAD s/p PCI to RCA with ADDISON (11/2019) on ASA, severe AS, mild LVH, grade II LV diastolic dysfunction, mild-mod MR, mod TR, mild pulm HTN with RVSP 30mmHg (TTE 5/2022, normal EF 60%), presenting with witnessed syncope, hypotension and tachycardia responsive to IVF, acute hypoxic respiratory failure found to have saddle pulmonary embolism with bilateral extension, +right heart strain and elevated biomarkers classified as intermediate high risk PE, admitted to MICU for further monitoring.     PLAN:    #Neuro:  h/o dementia, as per family pt is forgetful at baseline however AAOx3, now p/w syncope   - syncope 2/2 PE, CTH at [facility] with chronic white matter changes and parenchymal loss   - continue home Donepezil  - Neuro checks q4 hrs   - PT/OT    #Cardiovascular:   Hypotensive likely 2/2 obstructive shock i/s/o saddle pulmonary embolism and underlying aortic stenosis; intermediate high risk PE with +RHS and elevated biomarkers  - resolved after presentation, s/p 2L IVF at OSH. currently hemodynamically stable  - hold home antihypertensives  - troponin 391 --> 359 -->304, pro-BNP 1331-->2306;   - TTE report as above;  IV septum flattening, enlarged RV size, reduced RV function with apical sparing (Grace's sign), and dilated IVC. LV function visually normal with estimated EF 60-65%.  - continue Heparin gtt for pulmonary embolism; will f/u Vascular and interventional cardiology/CTS regarding possible nonemergent thrombectomy     Aortic stenosis, previously deferred evaluation of TAVR   - will hold further IVF  - f/u Cardiology recommendations      CAD s/p PCI to RCA with ADDISON in 2019   - continue ASA     #Respiratory   Acute hypoxic respiratory failure 2/2 saddle pulmonary embolism and RV compromise   - originally placed on NRB now weaned to RA, maintain SpO2 >92%    - management for PE as above   - Aspiration precautions    #GI  h/o GERD  - continue PPI  - Tolerating DASH diet  - FS q6 hrs   -Transaminitis  - f/u LFTS     #  h/o CKD3  - unclear baseline sCr, currently 1.53  - monitor UOP, strict I's and O's   - endometrial heterogenous thickening on CT Abdomen   - May need future Gyn evaluation given family history and now PE     #Endo  - f/u thyroid panel   - finger sticks q6 hrs while NPO   - f/u A1c     #ID  p/w hypotension, tachycardia, rectal temp 99.5, leukocytosis; although can all be attributed to PE will r/o sepsis etiology  - pt endorsed cough, will f/u RVP, sputum culture if pt expectorates  - CTH with acute sinus inflammatory changes   - f/u procalcitonin  - f/u blood cultures at OSH   - s/p Zosyn at OSH, will hold on further Antibiotics for now     #Heme/onc  Saddle pulmonary embolism  - unclear provoking factor, denies h/o clotting disorders  - will f/u hypercoagulability workup  - started on Heparin gtt, monitor aPTT; management for PE as above   - denies recent cancer screening, will f/u if pt agreeable   - LE venous dopplers with b/l DVT above/below the knew involving popiteal vein and tibioperoneal trunk    #GOC/Ethics:  - Daughter Iqra Pierce is HCP  - Discussed GOC with pt and daughter at bedside, they would wish to discuss further amongst themselves and f/u discussion with MICU team today, for now pt remains FULL CODE       For Followup:  - f/u Vascular and interventional cardiology/CTS regarding possible nonemergent thrombectomy       Vital Signs Last 24 Hrs  T(C): 36.3 (10 Oct 2024 12:00), Max: 37.5 (09 Oct 2024 19:16)  T(F): 97.4 (10 Oct 2024 12:00), Max: 99.5 (09 Oct 2024 19:16)  HR: 72 (10 Oct 2024 14:11) (70 - 112)  BP: 132/91 (10 Oct 2024 14:11) (111/60 - 183/76)  BP(mean): 107 (10 Oct 2024 14:11) (80 - 116)  RR: 29 (10 Oct 2024 14:11) (18 - 29)  SpO2: 98% (10 Oct 2024 14:11) (91% - 100%)    Parameters below as of 10 Oct 2024 14:11  Patient On (Oxygen Delivery Method): room air      I&O's Summary    10 Oct 2024 07:01  -  10 Oct 2024 15:35  --------------------------------------------------------  IN: 30 mL / OUT: 0 mL / NET: 30 mL        MEDICATIONS  (STANDING):  aspirin  chewable 81 milliGRAM(s) Oral daily  donepezil 5 milliGRAM(s) Oral at bedtime  heparin  Infusion. 1000 Unit(s)/Hr (10 mL/Hr) IV Continuous <Continuous>  pantoprazole    Tablet 40 milliGRAM(s) Oral before breakfast    MEDICATIONS  (PRN):        LABS                                            10.2                  Neurophils% (auto):   x      (10-10 @ 06:24):    9.53 )-----------(163          Lymphocytes% (auto):  x                                             33.1                   Eosinphils% (auto):   x        Manual%: Neutrophils x    ; Lymphocytes x    ; Eosinophils x    ; Bands%: x    ; Blasts x                                    139    |  108    |  39                  Calcium: 8.9   / iCa: x      (10-10 @ 06:24)    ----------------------------<  106       Magnesium: 2.5                              4.9     |  17     |  1.53             Phosphorous: 3.7      TPro  7.0    /  Alb  3.6    /  TBili  0.3    /  DBili  x      /  AST  65     /  ALT  51     /  AlkPhos  83     10 Oct 2024 06:24    ( 10-10 @ 06:24 )   PT: 11.9 sec;   INR: 1.04 ratio  aPTT: 53.6 sec

## 2024-10-11 NOTE — PROGRESS NOTE ADULT - PROBLEM SELECTOR PLAN 9
Diet: DASH  DVT ppx: heparin  Dispo: pending medical course Diet: DASH  DVT ppx: heparin drip  Dispo: pending medical course

## 2024-10-11 NOTE — OCCUPATIONAL THERAPY INITIAL EVALUATION ADULT - ADDITIONAL COMMENTS
Pt lives at [facility], independent with ADL/functional mobility using RW.

## 2024-10-11 NOTE — PROGRESS NOTE ADULT - PROBLEM SELECTOR PLAN 1
- unclear provoking factor, denies h/o clotting disorders  - Hypotensive likely 2/2 obstructive shock i/s/o saddle pulmonary embolism and underlying aortic stenosis; intermediate high risk PE with +RHS and elevated biomarkers  - Currently hemodynamically stable  - hold home antihypertensives  - troponin 391 --> 359 -->304, pro-BNP 1331-->2306;   - TTE report shows IV septum flattening, enlarged RV size, reduced RV function with apical sparing (Grace's sign), and dilated IVC. LV function visually normal with estimated EF 60-65%.  - continue Heparin gtt for pulmonary embolism; will f/u Vascular and interventional cardiology/CTS regarding possible nonemergent thrombectomy  - will f/u hypercoagulability workup  - started on Heparin gtt, monitor aPTT  - denies recent cancer screening  - LE venous dopplers with b/l DVT above/below the new involving popiteal vein and tibioperoneal trunk - unclear provoking factor, denies h/o clotting disorders  - Hypotensive likely 2/2 obstructive shock i/s/o saddle pulmonary embolism and underlying aortic stenosis; intermediate high risk PE with +RHS and elevated biomarkers  - Currently hemodynamically stable  - hold home antihypertensives  - troponin 391 --> 359 -->304, pro-BNP 1331-->2306;   - TTE report shows IV septum flattening, enlarged RV size, reduced RV function with apical sparing (Grace's sign), and dilated IVC. LV function visually normal with estimated EF 60-65%.  - continue Heparin gtt for pulmonary embolism; will f/u Vascular and interventional cardiology/CTS regarding possible nonemergent thrombectomy  - will f/u hypercoagulability workup  - denies recent cancer screening  - LE venous dopplers with b/l DVT above/below the new involving popiteal vein and tibioperoneal trunk

## 2024-10-11 NOTE — PROGRESS NOTE ADULT - PROBLEM SELECTOR PLAN 7
h/o dementia, as per family pt is forgetful at baseline however AAOx3, now p/w syncope   - syncope 2/2 PE, CTH at [facility] with chronic white matter changes and parenchymal loss   - continue home Donepezil  - Neuro checks q4 hrs   - PT/OT

## 2024-10-11 NOTE — OCCUPATIONAL THERAPY INITIAL EVALUATION ADULT - DIAGNOSIS, OT EVAL
decreased functional activity endurance, decreased strength, Impaired balance, impacting ability to perform ADL and functional mobility.

## 2024-10-11 NOTE — PROGRESS NOTE ADULT - ASSESSMENT
85 yo F with PMH of dementia, HTN, HLD, CKD3, CAD s/p PCI to RCA with ADDISON (11/2019) on ASA, severe AS, mild LVH, grade II LV diastolic dysfunction, mild-mod MR, mod TR, mild pulm HTN with RVSP 30mmHg (TTE 5/2022, normal EF 60%), presenting with witnessed syncope, hypotension and tachycardia responsive to IVF, acute hypoxic respiratory failure found to have saddle pulmonary embolism with bilateral extension, +right heart strain and elevated biomarkers classified as intermediate high risk PE, admitted to MICU for further monitoring and downgraded to medicine floors.

## 2024-10-11 NOTE — PROGRESS NOTE ADULT - ASSESSMENT
83 yo F with PMH of dementia, HTN, HLD, CKD3, CAD s/p PCI to RCA with ADDISNO (11/2019) on ASA, severe AS, mild LVH, grade II LV diastolic dysfunction, mild-mod MR, mod TR, mild pulm HTN with RVSP 30mmHg (TTE 5/2022, normal EF 60%), presenting with witnessed syncope, hypotension and tachycardia responsive to IVF, acute hypoxic respiratory failure found to have saddle pulmonary embolism with bilateral extension, +right heart strain and elevated biomarkers classified as intermediate high risk PE, admitted to MICU for further monitoring.     PLAN:    #Neuro:  h/o dementia, as per family pt is forgetful at baseline however AAOx3, now p/w syncope   - syncope 2/2 PE, CTH at [facility] with chronic white matter changes and parenchymal loss   - continue home Donepezil  - Neuro checks q4 hrs   - PT/OT    #Cardiovascular:   Hypotensive likely 2/2 obstructive shock i/s/o saddle pulmonary embolism and underlying aortic stenosis; intermediate high risk PE with +RHS and elevated biomarkers  - Currently hemodynamically stable  - hold home antihypertensives  - troponin 391 --> 359 -->304, pro-BNP 1331-->2306;   - TTE report shows IV septum flattening, enlarged RV size, reduced RV function with apical sparing (Grace's sign), and dilated IVC. LV function visually normal with estimated EF 60-65%.  - continue Heparin gtt for pulmonary embolism; will f/u Vascular and interventional cardiology/CTS regarding possible nonemergent thrombectomy     Aortic stenosis, previously deferred evaluation of TAVR   - will hold further IVF  - f/u Cardiology recommendations      CAD s/p PCI to RCA with ADDISON in 2019   - continue ASA     #Respiratory   Acute hypoxic respiratory failure 2/2 saddle pulmonary embolism and RV compromise   - originally placed on NRB now weaned to RA, maintain SpO2 >92%    - management for PE as above   - Aspiration precautions    #GI  h/o GERD  - continue PPI  - Tolerating DASH diet  - FS q6 hrs   - Transaminitis resolving    #  h/o CKD3  - unclear baseline sCr, now improving, currently 1.49 <--1.90   - monitor UOP, strict I's and O's   - endometrial heterogenous thickening on CT Abdomen   - May need future Gyn evaluation given family history and now PE     #Endo  - TSH is 1.36  - finger sticks q6 hrs while NPO   - A1c 5.3    #ID  p/w hypotension, tachycardia, rectal temp 99.5, leukocytosis; although can all be attributed to PE will r/o sepsis etiology  - pt endorsed cough, will f/u RVP, sputum culture if pt expectorates  - CTH with acute sinus inflammatory changes   - f/u procalcitonin  - f/u blood cultures at OSH   - s/p Zosyn at OSH, will hold on further Antibiotics for now     #Heme/onc  Saddle pulmonary embolism  - unclear provoking factor, denies h/o clotting disorders  - will f/u hypercoagulability workup  - started on Heparin gtt, monitor aPTT; management for PE as above   - denies recent cancer screening, will f/u if pt agreeable   - LE venous dopplers with b/l DVT above/below the knew involving popiteal vein and tibioperoneal trunk    #GOC/Ethics:  - Daughter Iqra Pierce is HCP  - Discussed GOC with pt and daughter at bedside, they would wish to discuss further amongst themselves and f/u discussion with MICU team today, for now pt remains FULL CODE

## 2024-10-12 LAB
ANION GAP SERPL CALC-SCNC: 12 MMOL/L — SIGNIFICANT CHANGE UP (ref 5–17)
APTT BLD: 26.8 SEC — SIGNIFICANT CHANGE UP (ref 24.5–35.6)
BUN SERPL-MCNC: 23 MG/DL — SIGNIFICANT CHANGE UP (ref 7–23)
CALCIUM SERPL-MCNC: 9 MG/DL — SIGNIFICANT CHANGE UP (ref 8.4–10.5)
CHLORIDE SERPL-SCNC: 107 MMOL/L — SIGNIFICANT CHANGE UP (ref 96–108)
CO2 SERPL-SCNC: 20 MMOL/L — LOW (ref 22–31)
CREAT SERPL-MCNC: 1.43 MG/DL — HIGH (ref 0.5–1.3)
EGFR: 36 ML/MIN/1.73M2 — LOW
GLUCOSE SERPL-MCNC: 88 MG/DL — SIGNIFICANT CHANGE UP (ref 70–99)
MAGNESIUM SERPL-MCNC: 2.3 MG/DL — SIGNIFICANT CHANGE UP (ref 1.6–2.6)
PHOSPHATE SERPL-MCNC: 2.8 MG/DL — SIGNIFICANT CHANGE UP (ref 2.5–4.5)
POTASSIUM SERPL-MCNC: 4 MMOL/L — SIGNIFICANT CHANGE UP (ref 3.5–5.3)
POTASSIUM SERPL-SCNC: 4 MMOL/L — SIGNIFICANT CHANGE UP (ref 3.5–5.3)
SODIUM SERPL-SCNC: 139 MMOL/L — SIGNIFICANT CHANGE UP (ref 135–145)

## 2024-10-12 PROCEDURE — 99233 SBSQ HOSP IP/OBS HIGH 50: CPT

## 2024-10-12 RX ADMIN — PANTOPRAZOLE SODIUM 40 MILLIGRAM(S): 40 TABLET, DELAYED RELEASE ORAL at 05:05

## 2024-10-12 RX ADMIN — BENZONATATE 100 MILLIGRAM(S): 150 CAPSULE ORAL at 21:29

## 2024-10-12 RX ADMIN — Medication 81 MILLIGRAM(S): at 12:40

## 2024-10-12 RX ADMIN — BENZONATATE 100 MILLIGRAM(S): 150 CAPSULE ORAL at 14:36

## 2024-10-12 RX ADMIN — APIXABAN 10 MILLIGRAM(S): 5 TABLET, FILM COATED ORAL at 21:30

## 2024-10-12 RX ADMIN — Medication 25 MILLIGRAM(S): at 05:05

## 2024-10-12 RX ADMIN — Medication 5 MILLIGRAM(S): at 21:30

## 2024-10-12 RX ADMIN — APIXABAN 10 MILLIGRAM(S): 5 TABLET, FILM COATED ORAL at 09:20

## 2024-10-12 RX ADMIN — BENZONATATE 100 MILLIGRAM(S): 150 CAPSULE ORAL at 05:04

## 2024-10-12 NOTE — DIETITIAN INITIAL EVALUATION ADULT - PHYSCIAL ASSESSMENT
Drug Dosing Weight  Height (cm): 157.5 (10 Oct 2024 05:45)  Weight (kg): 65.4 (10 Oct 2024 05:45)  BMI (kg/m2): 26.4 (10 Oct 2024 05:45)    Daily weight (standing or bed scale): none documented thus far   Weight obtained by RD: 67kg/147lb (10/12 bed scale)     Weight history:   Per pt: unable to recall weight history but denies recent weight changes   Previous RD notes: 70kg (11/9/19)   [place] HIE: unable to load [place] HIE at present      ** slight weight difference between dosing and bed scale weight obtained. RD will continue to monitor wt trends as available/able.     IBW: 110lb, 131% IBW

## 2024-10-12 NOTE — PROGRESS NOTE ADULT - PROBLEM SELECTOR PLAN 1
- unclear provoking factor, denies h/o clotting disorders  - Hypotensive likely 2/2 obstructive shock i/s/o saddle pulmonary embolism and underlying aortic stenosis; intermediate high risk PE with +RHS and elevated biomarkers  - Currently hemodynamically stable  - hold home antihypertensives  - troponin 391 --> 359 -->304, pro-BNP 1331-->2306;   - TTE report shows IV septum flattening, enlarged RV size, reduced RV function with apical sparing (Grace's sign), and dilated IVC. LV function visually normal with estimated EF 60-65%.  - continue Heparin gtt for pulmonary embolism; will f/u Vascular and interventional cardiology/CTS regarding possible nonemergent thrombectomy  - will f/u hypercoagulability workup  - denies recent cancer screening  - LE venous dopplers with b/l DVT above/below the new involving popiteal vein and tibioperoneal trunk - unclear provoking factor, denies h/o clotting disorders  - Hypotensive likely 2/2 obstructive shock i/s/o saddle pulmonary embolism and underlying aortic stenosis; intermediate high risk PE with +RHS and elevated biomarkers  - Currently hemodynamically stable  - hold home antihypertensives  - troponin 391 --> 359 -->304, pro-BNP 1331-->2306;   - TTE report shows IV septum flattening, enlarged RV size, reduced RV function with apical sparing (Grace's sign), and dilated IVC. LV function visually normal with estimated EF 60-65%.  - Stop Heparin gtt for pulmonary embolism; will   - F/u Vascular and interventional cardiology/CTS regarding possible nonemergent thrombectomy  - will f/u hypercoagulability workup  - denies recent cancer screening  - LE venous dopplers with b/l DVT above/below the new involving popiteal vein and tibioperoneal trunk - unclear provoking factor, denies h/o clotting disorders  - Hypotensive likely 2/2 obstructive shock i/s/o saddle pulmonary embolism and underlying aortic stenosis; intermediate high risk PE with +RHS and elevated biomarkers  - Currently hemodynamically stable  - hold home antihypertensives  - troponin 391 --> 359 -->304, pro-BNP 1331-->2306;   - TTE report shows IV septum flattening, enlarged RV size, reduced RV function with apical sparing (Grace's sign), and dilated IVC. LV function visually normal with estimated EF 60-65%.  - Stop Heparin gtt  - Start Eliquis 10mg BID  - F/u Vascular and interventional cardiology/CTS regarding possible nonemergent thrombectomy  - will f/u hypercoagulability workup  - denies recent cancer screening  - LE venous dopplers with b/l DVT above/below the new involving popiteal vein and tibioperoneal trunk

## 2024-10-12 NOTE — PROGRESS NOTE ADULT - PROBLEM SELECTOR PLAN 8
- unclear baseline sCr, now improving, currently 1.90 -> 1.49  - monitor UOP, strict I's and O's - unclear baseline sCr, now improving, currently 1.90 -> 1.49 -> 1.43  - monitor UOP, strict I's and O's

## 2024-10-12 NOTE — PROGRESS NOTE ADULT - PROBLEM SELECTOR PLAN 9
Diet: DASH  DVT ppx: heparin drip  Dispo: pending medical course Diet: DASH  DVT ppx: eliquis  Dispo: pending medical course

## 2024-10-12 NOTE — DIETITIAN INITIAL EVALUATION ADULT - REASON INDICATOR FOR ASSESSMENT
Pt seen for consult for MST score >/2. Information obtained from pt, RN, electronic medical record, family at bedside. Chart reviewed, events noted.

## 2024-10-12 NOTE — DIETITIAN INITIAL EVALUATION ADULT - PERTINENT LABORATORY DATA
10-12    139  |  107  |  23  ----------------------------<  88  4.0   |  20[L]  |  1.43[H]    Ca    9.0      12 Oct 2024 07:07  Phos  2.8     10-12  Mg     2.3     10-12    TPro  6.7  /  Alb  3.5  /  TBili  0.3  /  DBili  x   /  AST  32  /  ALT  36  /  AlkPhos  75  10-11  A1C with Estimated Average Glucose Result: 5.3 % (10-10-24 @ 06:25)

## 2024-10-12 NOTE — DIETITIAN INITIAL EVALUATION ADULT - NSFNSGIASSESSMENTFT_GEN_A_CORE
Ordered for Protonix and Miralax. Denies nausea/vomiting/constipation/diarrhea. Last BM on 10/11 per flowsheet.

## 2024-10-12 NOTE — PROGRESS NOTE ADULT - ASSESSMENT
83 yo F with PMH of dementia, HTN, HLD, CKD3, CAD s/p PCI to RCA with ADDISON (11/2019) on ASA, severe AS, mild LVH, grade II LV diastolic dysfunction, mild-mod MR, mod TR, mild pulm HTN with RVSP 30mmHg (TTE 5/2022, normal EF 60%), presenting with witnessed syncope, hypotension and tachycardia responsive to IVF, acute hypoxic respiratory failure found to have saddle pulmonary embolism with bilateral extension, +right heart strain and elevated biomarkers classified as intermediate high risk PE, admitted to MICU for further monitoring and downgraded to medicine floors.

## 2024-10-12 NOTE — DIETITIAN INITIAL EVALUATION ADULT - PERTINENT MEDS FT
MEDICATIONS  (STANDING):  apixaban 10 milliGRAM(s) Oral every 12 hours  aspirin  chewable 81 milliGRAM(s) Oral daily  benzonatate 100 milliGRAM(s) Oral three times a day  donepezil 5 milliGRAM(s) Oral at bedtime  metoprolol succinate ER 25 milliGRAM(s) Oral daily  pantoprazole    Tablet 40 milliGRAM(s) Oral before breakfast  polyethylene glycol 3350 17 Gram(s) Oral two times a day    MEDICATIONS  (PRN):

## 2024-10-12 NOTE — PROGRESS NOTE ADULT - PROBLEM SELECTOR PLAN 2
Likely 2/2 saddle pulmonary embolism and RV compromise   - originally placed on NRB now weaned to RA, maintain SpO2 >92%    - management for PE as above   - Aspiration precautions    RESOLVING

## 2024-10-12 NOTE — DIETITIAN INITIAL EVALUATION ADULT - OTHER INFO
-- Most recent HbA1c 5.3 on 10/12, no history of DM per chart  -- CKD 3 with abnormal Creatinine noted (1.43), will continue to monitor renal indices

## 2024-10-12 NOTE — PROGRESS NOTE ADULT - PROBLEM SELECTOR PLAN 7
h/o dementia, as per family pt is forgetful at baseline however AAOx3, now p/w syncope   - syncope 2/2 PE, CTH at [facility] with chronic white matter changes and parenchymal loss   - continue home Donepezil  - Neuro checks q4 hrs   - PT/OT

## 2024-10-12 NOTE — DIETITIAN INITIAL EVALUATION ADULT - ORAL INTAKE PTA/DIET HISTORY
Pt resides in assisted living prior to admission. No transfer paper in chart. Pt was eating well with no changes in appetite. Pt was not following therapeutic diet; eating well-balanced meals provided at assisted living. Confirms no known food allergies. Denies chewing or swallowing issues. Denies GI distress. Denies micronutrient supplement use, denies protein supplement use.

## 2024-10-12 NOTE — DIETITIAN INITIAL EVALUATION ADULT - ENTER TO (G/KG)
Anesthesia Evaluation     Patient summary reviewed and Nursing notes reviewed                Airway   Mallampati: II  TM distance: >3 FB  Neck ROM: full  No difficulty expected  Dental      Pulmonary - negative pulmonary ROS   Cardiovascular - negative cardio ROS        Neuro/Psych  (+) headaches  GI/Hepatic/Renal/Endo - negative ROS     Musculoskeletal (-) negative ROS    Abdominal    Substance History - negative use     OB/GYN    (+) Pregnant        Other                    Anesthesia Plan    ASA 2     epidural       Anesthetic plan, risks, benefits, and alternatives have been provided, discussed and informed consent has been obtained with: patient.    Use of blood products discussed with patient .      CODE STATUS:    Level Of Support Discussed With: Patient  Code Status (Patient has no pulse and is not breathing): CPR (Attempt to Resuscitate)  Medical Interventions (Patient has pulse or is breathing): Full Support      
1.2

## 2024-10-12 NOTE — PROGRESS NOTE ADULT - SUBJECTIVE AND OBJECTIVE BOX
INTERVAL HPI/OVERNIGHT EVENTS:  Pt seen and examined at bedside. No acute overnight events or complaints.    VITAL SIGNS:  T(F): 98.6 (10-12-24 @ 04:32)  HR: 82 (10-12-24 @ 04:32)  BP: 154/74 (10-12-24 @ 04:32)  RR: 18 (10-12-24 @ 04:32)  SpO2: 96% (10-12-24 @ 04:32)  Wt(kg): --    PHYSICAL EXAM:    GENERAL: Laying comfortably, NAD  EYES: EOMI, PERRL, no scleral icterus  NECK: No JVD  LUNG: Clear to auscultation bilaterally; No wheeze, crackles or rhonci  HEART: Regular rate and rhythm; No murmurs, rubs, or gallops  ABDOMEN: Soft, Nontender, Nondistended  EXTREMITIES:  No LE edema, 2+ Peripheral Pulses, No clubbing, cyanosis, or edema  PSYCH: AAOx3  NEUROLOGY: non-focal, strength 5/5 in all extremities, sensation intact  SKIN: No rashes or lesions    MEDICATIONS  (STANDING):  apixaban 10 milliGRAM(s) Oral every 12 hours  aspirin  chewable 81 milliGRAM(s) Oral daily  benzonatate 100 milliGRAM(s) Oral three times a day  donepezil 5 milliGRAM(s) Oral at bedtime  metoprolol succinate ER 25 milliGRAM(s) Oral daily  pantoprazole    Tablet 40 milliGRAM(s) Oral before breakfast  polyethylene glycol 3350 17 Gram(s) Oral two times a day    MEDICATIONS  (PRN):      Allergies    No Known Allergies    Intolerances        LABS:                        10.0   9.82  )-----------( 165      ( 11 Oct 2024 00:01 )             32.3     10-11    142  |  110[H]  |  33[H]  ----------------------------<  96  4.0   |  19[L]  |  1.49[H]    Ca    9.0      11 Oct 2024 00:01  Phos  3.1     10-11  Mg     2.4     10-11    TPro  6.7  /  Alb  3.5  /  TBili  0.3  /  DBili  x   /  AST  32  /  ALT  36  /  AlkPhos  75  10-11    PT/INR - ( 11 Oct 2024 01:24 )   PT: 12.1 sec;   INR: 1.06 ratio         PTT - ( 11 Oct 2024 08:28 )  PTT:80.0 sec  Urinalysis Basic - ( 11 Oct 2024 00:01 )    Color: x / Appearance: x / SG: x / pH: x  Gluc: 96 mg/dL / Ketone: x  / Bili: x / Urobili: x   Blood: x / Protein: x / Nitrite: x   Leuk Esterase: x / RBC: x / WBC x   Sq Epi: x / Non Sq Epi: x / Bacteria: x        RADIOLOGY & ADDITIONAL TESTS:  Reviewed      ******************  Authored By: Jack Neff MD PGY1  Internal Medicine  MS Teams Preferred  ******************   INTERVAL HPI/OVERNIGHT EVENTS:  Pt seen and examined at bedside. No acute overnight events or complaints.    VITAL SIGNS:  T(F): 98.6 (10-12-24 @ 04:32)  HR: 82 (10-12-24 @ 04:32)  BP: 154/74 (10-12-24 @ 04:32)  RR: 18 (10-12-24 @ 04:32)  SpO2: 96% (10-12-24 @ 04:32)  Wt(kg): --    PHYSICAL EXAM:    GENERAL: Laying comfortably, NAD  EYES: EOMI, PERRL, no scleral icterus  NECK: No JVD  LUNG: Clear to auscultation bilaterally; No wheeze, crackles or rhonci  HEART: Regular rate and rhythm; No murmurs, rubs, or gallops  ABDOMEN: Soft, Nontender, Nondistended  EXTREMITIES:  No LE edema, 2+ Peripheral Pulses, No clubbing, cyanosis, or edema  PSYCH: AAOx2  NEUROLOGY: non-focal, strength 5/5 in all extremities, sensation intact  SKIN: No rashes or lesions    MEDICATIONS  (STANDING):  apixaban 10 milliGRAM(s) Oral every 12 hours  aspirin  chewable 81 milliGRAM(s) Oral daily  benzonatate 100 milliGRAM(s) Oral three times a day  donepezil 5 milliGRAM(s) Oral at bedtime  metoprolol succinate ER 25 milliGRAM(s) Oral daily  pantoprazole    Tablet 40 milliGRAM(s) Oral before breakfast  polyethylene glycol 3350 17 Gram(s) Oral two times a day    MEDICATIONS  (PRN):      Allergies    No Known Allergies    Intolerances        LABS:                        10.0   9.82  )-----------( 165      ( 11 Oct 2024 00:01 )             32.3     10-11    142  |  110[H]  |  33[H]  ----------------------------<  96  4.0   |  19[L]  |  1.49[H]    Ca    9.0      11 Oct 2024 00:01  Phos  3.1     10-11  Mg     2.4     10-11    TPro  6.7  /  Alb  3.5  /  TBili  0.3  /  DBili  x   /  AST  32  /  ALT  36  /  AlkPhos  75  10-11    PT/INR - ( 11 Oct 2024 01:24 )   PT: 12.1 sec;   INR: 1.06 ratio         PTT - ( 11 Oct 2024 08:28 )  PTT:80.0 sec  Urinalysis Basic - ( 11 Oct 2024 00:01 )    Color: x / Appearance: x / SG: x / pH: x  Gluc: 96 mg/dL / Ketone: x  / Bili: x / Urobili: x   Blood: x / Protein: x / Nitrite: x   Leuk Esterase: x / RBC: x / WBC x   Sq Epi: x / Non Sq Epi: x / Bacteria: x        RADIOLOGY & ADDITIONAL TESTS:  Reviewed      ******************  Authored By: Jack Neff MD PGY1  Internal Medicine  MS Teams Preferred  ******************   INTERVAL HPI/OVERNIGHT EVENTS:  Pt seen and examined at bedside. No acute overnight events or complaints. Patient's heparin was stopped and started on eliquis. Patient with some slight dry cough but denies SOB, CP, fevers, chills.    VITAL SIGNS:  T(F): 98.6 (10-12-24 @ 04:32)  HR: 82 (10-12-24 @ 04:32)  BP: 154/74 (10-12-24 @ 04:32)  RR: 18 (10-12-24 @ 04:32)  SpO2: 96% (10-12-24 @ 04:32)  Wt(kg): --    PHYSICAL EXAM:    GENERAL: Laying comfortably, NAD  EYES: EOMI, PERRL, no scleral icterus  NECK: No JVD  LUNG: Clear to auscultation bilaterally; No wheeze, crackles or rhonci  HEART: Regular rate and rhythm; No murmurs, rubs, or gallops  ABDOMEN: Soft, Nontender, Nondistended  EXTREMITIES:  No LE edema, 2+ Peripheral Pulses, No clubbing, cyanosis, or edema  PSYCH: AAOx2  NEUROLOGY: non-focal, strength 5/5 in all extremities, sensation intact  SKIN: No rashes or lesions    MEDICATIONS  (STANDING):  apixaban 10 milliGRAM(s) Oral every 12 hours  aspirin  chewable 81 milliGRAM(s) Oral daily  benzonatate 100 milliGRAM(s) Oral three times a day  donepezil 5 milliGRAM(s) Oral at bedtime  metoprolol succinate ER 25 milliGRAM(s) Oral daily  pantoprazole    Tablet 40 milliGRAM(s) Oral before breakfast  polyethylene glycol 3350 17 Gram(s) Oral two times a day    MEDICATIONS  (PRN):      Allergies    No Known Allergies    Intolerances        LABS:                        10.0   9.82  )-----------( 165      ( 11 Oct 2024 00:01 )             32.3     10-11    142  |  110[H]  |  33[H]  ----------------------------<  96  4.0   |  19[L]  |  1.49[H]    Ca    9.0      11 Oct 2024 00:01  Phos  3.1     10-11  Mg     2.4     10-11    TPro  6.7  /  Alb  3.5  /  TBili  0.3  /  DBili  x   /  AST  32  /  ALT  36  /  AlkPhos  75  10-11    PT/INR - ( 11 Oct 2024 01:24 )   PT: 12.1 sec;   INR: 1.06 ratio         PTT - ( 11 Oct 2024 08:28 )  PTT:80.0 sec  Urinalysis Basic - ( 11 Oct 2024 00:01 )    Color: x / Appearance: x / SG: x / pH: x  Gluc: 96 mg/dL / Ketone: x  / Bili: x / Urobili: x   Blood: x / Protein: x / Nitrite: x   Leuk Esterase: x / RBC: x / WBC x   Sq Epi: x / Non Sq Epi: x / Bacteria: x        RADIOLOGY & ADDITIONAL TESTS:  Reviewed      ******************  Authored By: Jack Neff MD PGY1  Internal Medicine  MS Teams Preferred  ******************

## 2024-10-12 NOTE — DIETITIAN INITIAL EVALUATION ADULT - ENERGY INTAKE
Pt reports pretty good appetite ~75% of foods provided since admission. declines oral nutrition supplements when offered. Pt made aware of menu ordering procedures in house with menu provided, encourage pt to order as needed to encourage PO intake while in house.  Adequate (%)

## 2024-10-13 LAB
ALBUMIN SERPL ELPH-MCNC: 3.4 G/DL — SIGNIFICANT CHANGE UP (ref 3.3–5)
ALP SERPL-CCNC: 71 U/L — SIGNIFICANT CHANGE UP (ref 40–120)
ALT FLD-CCNC: 18 U/L — SIGNIFICANT CHANGE UP (ref 10–45)
ANION GAP SERPL CALC-SCNC: 13 MMOL/L — SIGNIFICANT CHANGE UP (ref 5–17)
AST SERPL-CCNC: 15 U/L — SIGNIFICANT CHANGE UP (ref 10–40)
BASOPHILS # BLD AUTO: 0.05 K/UL — SIGNIFICANT CHANGE UP (ref 0–0.2)
BASOPHILS NFR BLD AUTO: 0.6 % — SIGNIFICANT CHANGE UP (ref 0–2)
BILIRUB SERPL-MCNC: 0.3 MG/DL — SIGNIFICANT CHANGE UP (ref 0.2–1.2)
BUN SERPL-MCNC: 24 MG/DL — HIGH (ref 7–23)
CALCIUM SERPL-MCNC: 8.9 MG/DL — SIGNIFICANT CHANGE UP (ref 8.4–10.5)
CHLORIDE SERPL-SCNC: 108 MMOL/L — SIGNIFICANT CHANGE UP (ref 96–108)
CO2 SERPL-SCNC: 19 MMOL/L — LOW (ref 22–31)
CREAT SERPL-MCNC: 1.6 MG/DL — HIGH (ref 0.5–1.3)
EGFR: 31 ML/MIN/1.73M2 — LOW
EOSINOPHIL # BLD AUTO: 0.23 K/UL — SIGNIFICANT CHANGE UP (ref 0–0.5)
EOSINOPHIL NFR BLD AUTO: 2.9 % — SIGNIFICANT CHANGE UP (ref 0–6)
GLUCOSE SERPL-MCNC: 88 MG/DL — SIGNIFICANT CHANGE UP (ref 70–99)
HCT VFR BLD CALC: 32.4 % — LOW (ref 34.5–45)
HGB BLD-MCNC: 9.8 G/DL — LOW (ref 11.5–15.5)
IMM GRANULOCYTES NFR BLD AUTO: 0.8 % — SIGNIFICANT CHANGE UP (ref 0–0.9)
LYMPHOCYTES # BLD AUTO: 1.87 K/UL — SIGNIFICANT CHANGE UP (ref 1–3.3)
LYMPHOCYTES # BLD AUTO: 23.6 % — SIGNIFICANT CHANGE UP (ref 13–44)
MAGNESIUM SERPL-MCNC: 2.3 MG/DL — SIGNIFICANT CHANGE UP (ref 1.6–2.6)
MCHC RBC-ENTMCNC: 28.2 PG — SIGNIFICANT CHANGE UP (ref 27–34)
MCHC RBC-ENTMCNC: 30.2 GM/DL — LOW (ref 32–36)
MCV RBC AUTO: 93.1 FL — SIGNIFICANT CHANGE UP (ref 80–100)
MONOCYTES # BLD AUTO: 0.72 K/UL — SIGNIFICANT CHANGE UP (ref 0–0.9)
MONOCYTES NFR BLD AUTO: 9.1 % — SIGNIFICANT CHANGE UP (ref 2–14)
NEUTROPHILS # BLD AUTO: 5 K/UL — SIGNIFICANT CHANGE UP (ref 1.8–7.4)
NEUTROPHILS NFR BLD AUTO: 63 % — SIGNIFICANT CHANGE UP (ref 43–77)
NRBC # BLD: 0 /100 WBCS — SIGNIFICANT CHANGE UP (ref 0–0)
PHOSPHATE SERPL-MCNC: 3.1 MG/DL — SIGNIFICANT CHANGE UP (ref 2.5–4.5)
PLATELET # BLD AUTO: 196 K/UL — SIGNIFICANT CHANGE UP (ref 150–400)
POTASSIUM SERPL-MCNC: 4.2 MMOL/L — SIGNIFICANT CHANGE UP (ref 3.5–5.3)
POTASSIUM SERPL-SCNC: 4.2 MMOL/L — SIGNIFICANT CHANGE UP (ref 3.5–5.3)
PROT SERPL-MCNC: 6.9 G/DL — SIGNIFICANT CHANGE UP (ref 6–8.3)
RBC # BLD: 3.48 M/UL — LOW (ref 3.8–5.2)
RBC # FLD: 15.2 % — HIGH (ref 10.3–14.5)
SODIUM SERPL-SCNC: 140 MMOL/L — SIGNIFICANT CHANGE UP (ref 135–145)
WBC # BLD: 7.93 K/UL — SIGNIFICANT CHANGE UP (ref 3.8–10.5)
WBC # FLD AUTO: 7.93 K/UL — SIGNIFICANT CHANGE UP (ref 3.8–10.5)

## 2024-10-13 PROCEDURE — 99233 SBSQ HOSP IP/OBS HIGH 50: CPT

## 2024-10-13 RX ADMIN — BENZONATATE 100 MILLIGRAM(S): 150 CAPSULE ORAL at 21:54

## 2024-10-13 RX ADMIN — Medication 5 MILLIGRAM(S): at 21:54

## 2024-10-13 RX ADMIN — APIXABAN 10 MILLIGRAM(S): 5 TABLET, FILM COATED ORAL at 21:54

## 2024-10-13 RX ADMIN — BENZONATATE 100 MILLIGRAM(S): 150 CAPSULE ORAL at 05:47

## 2024-10-13 RX ADMIN — BENZONATATE 100 MILLIGRAM(S): 150 CAPSULE ORAL at 13:29

## 2024-10-13 RX ADMIN — Medication 25 MILLIGRAM(S): at 05:47

## 2024-10-13 RX ADMIN — Medication 81 MILLIGRAM(S): at 11:43

## 2024-10-13 RX ADMIN — APIXABAN 10 MILLIGRAM(S): 5 TABLET, FILM COATED ORAL at 08:48

## 2024-10-13 RX ADMIN — PANTOPRAZOLE SODIUM 40 MILLIGRAM(S): 40 TABLET, DELAYED RELEASE ORAL at 05:47

## 2024-10-13 NOTE — PROGRESS NOTE ADULT - PROBLEM SELECTOR PLAN 7
h/o dementia, as per family pt is forgetful at baseline however AAOx3, now p/w syncope   - syncope 2/2 PE, CTH at [facility] with chronic white matter changes and parenchymal loss   - continue home Donepezil  - Neuro checks q4 hrs   - PT/OT

## 2024-10-13 NOTE — PROGRESS NOTE ADULT - SUBJECTIVE AND OBJECTIVE BOX
Patient is a 84y old  Female who presents with a chief complaint of Other pulmonary embolism without acute cor pulmonale (12 Oct 2024 11:53)    INTERVAL HX:  - no acute events overnight    Review of Systems: Focused ROS negative other that those stated above.    Allergies:  No Known Allergies    Medications:  apixaban 10 milliGRAM(s) Oral every 12 hours  aspirin  chewable 81 milliGRAM(s) Oral daily  benzonatate 100 milliGRAM(s) Oral three times a day  donepezil 5 milliGRAM(s) Oral at bedtime  metoprolol succinate ER 25 milliGRAM(s) Oral daily  pantoprazole    Tablet 40 milliGRAM(s) Oral before breakfast  polyethylene glycol 3350 17 Gram(s) Oral two times a day    Vitals:  T(C): 36.6 (10-13-24 @ 04:00), Max: 36.8 (10-12-24 @ 11:23)  HR: 68 (10-13-24 @ 04:00) (68 - 74)  BP: 131/79 (10-13-24 @ 04:00) (93/66 - 132/71)  RR: 18 (10-13-24 @ 04:00) (18 - 19)  SpO2: 96% (10-13-24 @ 04:00) (94% - 97%)  I/O's:    Physical Exam:  CONSTITUTIONAL:  no apparent distress.  SKIN: No rashes or ecchymosis.  EYES: PERRLA. EOMI. No scleral icterus.  ENT: Supple. No discharge or glossitis. Oral mucosa with moist membranes.  CV: RRR. Normal S1 and S2. No murmurs, rubs, or gallops. No edema. Pulses equal bilaterally.  PULM: Clear to auscultation throughout. Respirations unlabored. No wheezing, rales, or rhonchi.  GI: Soft, non-tender, non-distended. No palpable masses.   MSK: No cyanosis or clubbing.  NEURO: CN grossly intact  PSYCH: A+O, appropriately communicating and responding to questions    Labs:                        9.8    7.93  )-----------( 196      ( 13 Oct 2024 06:48 )             32.4     10-13    140  |  108  |  24[H]  ----------------------------<  88  4.2   |  19[L]  |  1.60[H]    Ca    8.9      13 Oct 2024 06:48  Phos  3.1     10-13  Mg     2.3     10-13    TPro  6.9  /  Alb  3.4  /  TBili  0.3  /  DBili  x   /  AST  15  /  ALT  18  /  AlkPhos  71  10-13    PTT - ( 12 Oct 2024 07:07 )  PTT:26.8 sec    Radiology/Procedures: Reviewed.

## 2024-10-13 NOTE — PROGRESS NOTE ADULT - PROBLEM SELECTOR PLAN 8
- unclear baseline sCr, now improving, currently 1.90 -> 1.49 -> 1.43  - monitor UOP, strict I's and O's

## 2024-10-13 NOTE — PROGRESS NOTE ADULT - PROBLEM SELECTOR PLAN 1
- unclear provoking factor, denies h/o clotting disorders  - Hypotensive likely 2/2 obstructive shock i/s/o saddle pulmonary embolism and underlying aortic stenosis; intermediate high risk PE with +RHS and elevated biomarkers  - Currently hemodynamically stable  - hold home antihypertensives  - troponin 391 --> 359 -->304, pro-BNP 1331-->2306;   - TTE report shows IV septum flattening, enlarged RV size, reduced RV function with apical sparing (Grace's sign), and dilated IVC. LV function visually normal with estimated EF 60-65%.  - S/p Heparin gtt  - continue Eliquis 10mg BID  - F/u Vascular and interventional cardiology/CTS regarding possible nonemergent thrombectomy  - will f/u hypercoagulability workup  - denies recent cancer screening  - LE venous dopplers with b/l DVT above/below the new involving popiteal vein and tibioperoneal trunk

## 2024-10-14 ENCOUNTER — TRANSCRIPTION ENCOUNTER (OUTPATIENT)
Age: 84
End: 2024-10-14

## 2024-10-14 PROCEDURE — 99232 SBSQ HOSP IP/OBS MODERATE 35: CPT | Mod: GC

## 2024-10-14 PROCEDURE — 76830 TRANSVAGINAL US NON-OB: CPT | Mod: 26

## 2024-10-14 RX ADMIN — Medication 81 MILLIGRAM(S): at 12:32

## 2024-10-14 RX ADMIN — Medication 5 MILLIGRAM(S): at 21:40

## 2024-10-14 RX ADMIN — APIXABAN 10 MILLIGRAM(S): 5 TABLET, FILM COATED ORAL at 21:40

## 2024-10-14 RX ADMIN — Medication 17 GRAM(S): at 06:18

## 2024-10-14 RX ADMIN — BENZONATATE 100 MILLIGRAM(S): 150 CAPSULE ORAL at 06:18

## 2024-10-14 RX ADMIN — PANTOPRAZOLE SODIUM 40 MILLIGRAM(S): 40 TABLET, DELAYED RELEASE ORAL at 06:18

## 2024-10-14 RX ADMIN — Medication 25 MILLIGRAM(S): at 06:18

## 2024-10-14 RX ADMIN — BENZONATATE 100 MILLIGRAM(S): 150 CAPSULE ORAL at 12:32

## 2024-10-14 RX ADMIN — APIXABAN 10 MILLIGRAM(S): 5 TABLET, FILM COATED ORAL at 08:22

## 2024-10-14 RX ADMIN — BENZONATATE 100 MILLIGRAM(S): 150 CAPSULE ORAL at 21:39

## 2024-10-14 NOTE — PROGRESS NOTE ADULT - PROBLEM SELECTOR PLAN 3
- endometrial heterogenous thickening on CT Abdomen   - Transvaginal U/S ordered  - May need future Gyn evaluation given family history and now PE - endometrial heterogenous thickening on CT Abdomen   - Transvaginal U/S today, patient on schedule  - May need future Gyn evaluation given family history and now PE

## 2024-10-14 NOTE — DISCHARGE NOTE PROVIDER - NSDCMRMEDTOKEN_GEN_ALL_CORE_FT
aspirin 81 mg oral tablet: orally once a day  aspirin 81 mg oral tablet, chewable: 1 tab(s) orally once a day  atorvastatin 40 mg oral tablet: 1 tab(s) orally once a day (at bedtime)  atorvastatin 40 mg oral tablet: 1 tab(s) orally once a day  clopidogrel 75 mg oral tablet: 1 tab(s) orally once a day  donepezil 5 mg oral tablet: 1 tab(s) orally once a day  metoprolol succinate 50 mg oral tablet, extended release: 1 tab(s) orally once a day  metoprolol succinate 50 mg oral tablet, extended release: 1 tab(s) orally once a day  nystatin 100,000 units/g topical cream: Apply topically to affected area once a day  pantoprazole 40 mg oral delayed release tablet: 1 tab(s) orally once a day (before a meal)  Protonix 40 mg oral delayed release tablet: 1 tab(s) orally once a day   apixaban 5 mg oral tablet: 2 tab(s) orally every 12 hours  aspirin 81 mg oral tablet: orally once a day  aspirin 81 mg oral tablet, chewable: 1 tab(s) orally once a day  atorvastatin 40 mg oral tablet: 1 tab(s) orally once a day (at bedtime)  atorvastatin 40 mg oral tablet: 1 tab(s) orally once a day  benzonatate 100 mg oral capsule: 1 cap(s) orally 3 times a day  clopidogrel 75 mg oral tablet: 1 tab(s) orally once a day  donepezil 5 mg oral tablet: 1 tab(s) orally once a day  Eliquis 5 mg oral tablet: 1 tab(s) orally every 12 hours  metoprolol succinate 50 mg oral tablet, extended release: 1 tab(s) orally once a day  metoprolol succinate 50 mg oral tablet, extended release: 1 tab(s) orally once a day  nystatin 100,000 units/g topical cream: Apply topically to affected area once a day  pantoprazole 40 mg oral delayed release tablet: 1 tab(s) orally once a day (before a meal)  polyethylene glycol 3350 oral powder for reconstitution: 17 gram(s) orally 2 times a day  Protonix 40 mg oral delayed release tablet: 1 tab(s) orally once a day   apixaban 5 mg oral tablet: 2 tab(s) orally every 12 hours Please take 2 tablets twice a day until 10/18  aspirin 81 mg oral tablet: orally once a day  atorvastatin 40 mg oral tablet: 1 tab(s) orally once a day (at bedtime)  benzonatate 100 mg oral capsule: 1 cap(s) orally 3 times a day  donepezil 5 mg oral tablet: 1 tab(s) orally once a day  Eliquis 5 mg oral tablet: 1 tab(s) orally every 12 hours Please take 1 tablet twice a day after 10/18  metoprolol succinate 25 mg oral tablet, extended release: 1 tab(s) orally once a day  nystatin 100,000 units/g topical cream: Apply topically to affected area once a day  pantoprazole 40 mg oral delayed release tablet: 1 tab(s) orally once a day (before a meal)  polyethylene glycol 3350 oral powder for reconstitution: 17 gram(s) orally 2 times a day

## 2024-10-14 NOTE — DISCHARGE NOTE PROVIDER - NSDCCPCAREPLAN_GEN_ALL_CORE_FT
PRINCIPAL DISCHARGE DIAGNOSIS  Diagnosis: Saddle pulmonary embolus  Assessment and Plan of Treatment: The patient was admitted with a saddle pulmonary embolus, which is a serious condition where a large blood clot forms at the point where the main artery splits to go into the lungs. This can block blood flow and cause major problems. When the patient arrived, they experienced sudden shortness of breath, chest pain (especially when breathing in), a fast heartbeat, and feelings of anxiety. These symptoms happen because the blocked blood flow reduces oxygen in the body. To treat this condition, the patient was given heparin through an IV to stop the clot from getting bigger and to stabilize it. Once the patient was stable, they were switched to apixaban (Eliquis), a pill that helps prevent new clots and is easier to manage than other options. The patient was also assessed by the Pulmonary Embolism Response Team (PERT), who decided that surgery to remove the clot wasn’t necessary since the patient remained stable for several days. Before discharge, the patient received important instructions. They need to take the apixaban as directed, slowly increase their activity level, and avoid any risky activities that could lead to falls. They were told to return to the hospital if they have increased shortness of breath, worsening chest pain, swelling in one leg, unexplained bruising, or severe headaches. A follow-up visit with their doctor or a specialist is recommended within a week to check on their recovery. They were also advised to eat a healthy diet and stay hydrated. In summary, the patient is being discharged in stable condition, fully informed about their treatment plan and follow-up care.      SECONDARY DISCHARGE DIAGNOSES  Diagnosis: Increased endometrial stripe thickness  Assessment and Plan of Treatment: After we did a CT scan, we found that there was increased thickening of the endometrium. The endometrium is the inner lining of the uterus, which is the organ in a woman's body where a baby can grow during pregnancy in women who have not gone through menopause. In people who are pre-menopausal it is normal for the endometrium to be thickened. In older women, the thickening might be a result of hormonal changes or could indicate other conditions, such as fibroids or even early signs of uterine cancer. It’s important to monitor this because a thickened endometrium can sometimes lead to unusual bleeding or other health issues. We got an ultrasound and it showed ___________. We recommend that you follow up with your gynecologist to evaluate for the cause of the endometrial thickening.     PRINCIPAL DISCHARGE DIAGNOSIS  Diagnosis: Saddle pulmonary embolus  Assessment and Plan of Treatment: The patient was admitted with a saddle pulmonary embolus, which is a serious condition where a large blood clot forms at the point where the main artery splits to go into the lungs. This can block blood flow and cause major problems. When the patient arrived, they experienced sudden shortness of breath, chest pain (especially when breathing in), a fast heartbeat, and feelings of anxiety. These symptoms happen because the blocked blood flow reduces oxygen in the body. To treat this condition, the patient was given heparin through an IV to stop the clot from getting bigger and to stabilize it. Once the patient was stable, they were switched to apixaban (Eliquis), a pill that helps prevent new clots and is easier to manage than other options. The patient was also assessed by the Pulmonary Embolism Response Team (PERT), who decided that surgery to remove the clot wasn’t necessary since the patient remained stable for several days. Before discharge, the patient received important instructions. They need to take the apixaban as directed, slowly increase their activity level, and avoid any risky activities that could lead to falls. They were told to return to the hospital if they have increased shortness of breath, worsening chest pain, swelling in one leg, unexplained bruising, or severe headaches. A follow-up visit with either Dr. Valera, Dr. Kush Ortega, or Dr. Kirk Lemon will be arranged within 1-2 weeks. They were also advised to eat a healthy diet and stay hydrated. In summary, the patient is being discharged in stable condition, fully informed about their treatment plan and follow-up care.      SECONDARY DISCHARGE DIAGNOSES  Diagnosis: Increased endometrial stripe thickness  Assessment and Plan of Treatment: After we did a CT scan, we found that there was increased thickening of the endometrium. The endometrium is the inner lining of the uterus, which is the organ in a woman's body where a baby can grow during pregnancy in women who have not gone through menopause. In people who are pre-menopausal it is normal for the endometrium to be thickened. In older women, the thickening might be a result of hormonal changes or could indicate other conditions, such as fibroids or even early signs of uterine cancer. It’s important to monitor this because a thickened endometrium can sometimes lead to unusual bleeding or other health issues. We got an ultrasound and it showed endometrial thickening with multiple cystic regions. We recommend that you follow up with your gynecologist to to get an ednometrial biopsy to evaluate for the cause of the endometrial thickening.     PRINCIPAL DISCHARGE DIAGNOSIS  Diagnosis: Saddle pulmonary embolus  Assessment and Plan of Treatment: The patient was admitted with a saddle pulmonary embolus, which is a serious condition where a large blood clot forms at the point where the main artery splits to go into the lungs. This can block blood flow and cause major problems. When the patient arrived, they experienced sudden shortness of breath, chest pain (especially when breathing in), a fast heartbeat, and feelings of anxiety. These symptoms happen because the blocked blood flow reduces oxygen in the body. To treat this condition, the patient was given heparin through an IV to stop the clot from getting bigger and to stabilize it. Once the patient was stable, they were switched to apixaban (Eliquis), a pill that helps prevent new clots and is easier to manage than other options. The patient was also assessed by the Pulmonary Embolism Response Team (PERT), who decided that surgery to remove the clot wasn’t necessary since the patient remained stable for several days. Before discharge, the patient received important instructions. They need to take the apixaban as directed (10mg twice a day until 10/18, and 5mg twice a day afterwards), slowly increase their activity level, and avoid any risky activities that could lead to falls. They were told to return to the hospital if they have increased shortness of breath, worsening chest pain, swelling in one leg, unexplained bruising, or severe headaches. A follow-up visit with either Dr. Valera, Dr. Kush Ortega, or Dr. Kirk Lemon will be arranged within 1-2 weeks. They were also advised to eat a healthy diet and stay hydrated. In summary, the patient is being discharged in stable condition, fully informed about their treatment plan and follow-up care.      SECONDARY DISCHARGE DIAGNOSES  Diagnosis: Increased endometrial stripe thickness  Assessment and Plan of Treatment: After we did a CT scan, we found that there was increased thickening of the endometrium. The endometrium is the inner lining of the uterus, which is the organ in a woman's body where a baby can grow during pregnancy in women who have not gone through menopause. In people who are pre-menopausal it is normal for the endometrium to be thickened. In older women, the thickening might be a result of hormonal changes or could indicate other conditions, such as fibroids or even early signs of uterine cancer. It’s important to monitor this because a thickened endometrium can sometimes lead to unusual bleeding or other health issues. We got an ultrasound and it showed endometrial thickening with multiple cystic regions. We recommend that you follow up with your gynecologist to to get an ednometrial biopsy to evaluate for the cause of the endometrial thickening.

## 2024-10-14 NOTE — PROGRESS NOTE ADULT - TIME BILLING
Chart review, exam, documentation, orders, d/w pt and consultants.
Chart review, exam, documentation, d/w pt, consultants.

## 2024-10-14 NOTE — PROGRESS NOTE ADULT - PROBLEM SELECTOR PLAN 7
h/o dementia, as per family pt is forgetful at baseline however AAOx3, now p/w syncope   - syncope 2/2 PE, CTH at [facility] with chronic white matter changes and parenchymal loss   - continue home Donepezil  - Neuro checks q4 hrs   - PT/OT h/o dementia, as per family pt is forgetful at baseline however AAOx3, now p/w syncope   - syncope 2/2 PE, CTH at [facility] with chronic white matter changes and parenchymal loss   - continue home Donepezil  - Neuro checks q4 hrs   - PT/OT - no skilled PT needs

## 2024-10-14 NOTE — DISCHARGE NOTE PROVIDER - NSDCCPTREATMENT_GEN_ALL_CORE_FT
PRINCIPAL PROCEDURE  Procedure: CT angiogram chest w contrast  Findings and Treatment: Saddle pulmonary embolus extending into the right left main pulmonary arteries, and into multiple bilateral lobar, segmental, and subsegmental   pulmonary branches as above. Evidence of right heart strain.      SECONDARY PROCEDURE  Procedure: CT abdomen pelvis  Findings and Treatment: As on the prior study of 6/20/2024, the endometrium appears heterogeneous and thickened. This may be further assessed with nonemergent ultrasound. Consider gynecologic consultation if clinically warranted.    Procedure: VS vein duplex scan BILAT LE  Findings and Treatment: Right lower extremity acute DVT, above and below the knee, involving the   right popliteal vein and tibioperoneal trunk vein. Also, there is   superficial venous thrombosis of the right small saphenous vein.  Left lower extremity DVT, above and below the knee, involving the left   popliteal vein, tibioperoneal trunk vein, posterior tibial vein,   solid-appearing, and gastrocnemius vein.     PRINCIPAL PROCEDURE  Procedure: CT angiogram chest w contrast  Findings and Treatment: Saddle pulmonary embolus extending into the right left main pulmonary arteries, and into multiple bilateral lobar, segmental, and subsegmental   pulmonary branches as above. Evidence of right heart strain.      SECONDARY PROCEDURE  Procedure: CT abdomen pelvis  Findings and Treatment: As on the prior study of 6/20/2024, the endometrium appears heterogeneous and thickened. This may be further assessed with nonemergent ultrasound. Consider gynecologic consultation if clinically warranted.    Procedure: VS vein duplex scan BILAT LE  Findings and Treatment: Right lower extremity acute DVT, above and below the knee, involving the   right popliteal vein and tibioperoneal trunk vein. Also, there is   superficial venous thrombosis of the right small saphenous vein.  Left lower extremity DVT, above and below the knee, involving the left   popliteal vein, tibioperoneal trunk vein, posterior tibial vein,   solid-appearing, and gastrocnemius vein.    Procedure: US pelvis transvaginal  Findings and Treatment: 1. The endometrium is thickened measuring up to 2.2 cm and is   heterogeneous in echotexture with multiple cystic spaces. Differential   considerations include endometrial hyperplasia and neoplasm. Recommend   further assessment with tissue sampling. Gynecologic consultation is   recommended.  2. The ovaries were not visualized on this examination; therefore, they   are not evaluated. However, no adnexal cyst or mass is identified.

## 2024-10-14 NOTE — DISCHARGE NOTE PROVIDER - NSDCQMSTAIRS_GEN_ALL_CORE
8 y M no PMH presenting s/p head injury sustained while playing flag tag. hit occipital head on the ground, denies LOC, witnessed by school staff. no nausea, vomiting. Patient had drowsiness afterwards and reportedly repeatedly was dozing off and difficult to keep awake. Now behaving normally without somnolence or specific complaint. No neck pain, other injury, or headache unless he touches the area of the contact. No laceration or bleeding. no vision changes. No change in gait.  Exam: NAD, Right occipital scalp hematoma 3cm x 3cm, no laceration, no best sign or raccoon eyes or hemotympanum, HEENT: mmm, EOMI, PERRLA. Neck: supple, nontender, no stepoff, nl ROM, Heart: RRR, no murmur, Lungs: BCTA, no signs of increased WOB, Abd: NTND, no guarding or rebound, no hernia palpated, no organomegaly, CVAT. MSK: chest, back, and ext nontender, nl rom, no deformity. Neuro: Alert, cooperative, SNIDER equally, normal behavior, interaction and coordination for age.  A/P: PECARN + for AMS after the injury, though now normal. Discussed risk and benefit of scan with patient's family, result of discussion is we will perform head CT. Yes

## 2024-10-14 NOTE — DISCHARGE NOTE PROVIDER - CARE PROVIDERS DIRECT ADDRESSES
[email or web address]

## 2024-10-14 NOTE — PROGRESS NOTE ADULT - PROBLEM SELECTOR PLAN 2
Likely 2/2 saddle pulmonary embolism and RV compromise   - originally placed on NRB now weaned to RA, maintain SpO2 >92%    - management for PE as above   - Aspiration precautions    RESOLVING Likely 2/2 saddle pulmonary embolism and RV compromise   - originally placed on NRB now weaned to RA, maintain SpO2 >92%    - management for PE as above   - Aspiration precautions    RESOLVED

## 2024-10-14 NOTE — PROGRESS NOTE ADULT - PROBLEM SELECTOR PLAN 1
- unclear provoking factor, denies h/o clotting disorders  - Hypotensive likely 2/2 obstructive shock i/s/o saddle pulmonary embolism and underlying aortic stenosis; intermediate high risk PE with +RHS and elevated biomarkers  - Currently hemodynamically stable  - hold home antihypertensives  - troponin 391 --> 359 -->304, pro-BNP 1331-->2306;   - TTE report shows IV septum flattening, enlarged RV size, reduced RV function with apical sparing (Grace's sign), and dilated IVC. LV function visually normal with estimated EF 60-65%.  - S/p Heparin gtt  - continue Eliquis 10mg BID  - F/u Vascular and interventional cardiology/CTS regarding possible nonemergent thrombectomy  - will f/u hypercoagulability workup  - denies recent cancer screening  - LE venous dopplers with b/l DVT above/below the new involving popiteal vein and tibioperoneal trunk - unclear provoking factor, denies h/o clotting disorders  - Hypotensive likely 2/2 obstructive shock i/s/o saddle pulmonary embolism and underlying aortic stenosis; intermediate high risk PE with +RHS and elevated biomarkers  - Currently hemodynamically stable  - hold home antihypertensives  - troponin 391 --> 359 -->304, pro-BNP 1331-->2306;   - TTE report shows IV septum flattening, enlarged RV size, reduced RV function with apical sparing (Grace's sign), and dilated IVC. LV function visually normal with estimated EF 60-65%.  - S/p Heparin gtt  - continue Eliquis 10mg BID  - F/u Vascular and interventional cardiology/CTS regarding possible nonemergent thrombectomy  - Hypercoagulability workup - negative  - denies recent cancer screening  - LE venous dopplers with b/l DVT above/below the new involving popiteal vein and tibioperoneal trunk - unclear provoking factor, denies h/o clotting disorders  - Hypotensive likely 2/2 obstructive shock i/s/o saddle pulmonary embolism and underlying aortic stenosis; intermediate high risk PE with +RHS and elevated biomarkers  - Currently hemodynamically stable  - hold home antihypertensives  - troponin 391 --> 359 -->304, pro-BNP 1331-->2306;   - TTE report shows IV septum flattening, enlarged RV size, reduced RV function with apical sparing (Grace's sign), and dilated IVC. LV function visually normal with estimated EF 60-65%.  - S/p Heparin gtt  - C/w Eliquis 10mg BID  - F/u Vascular and interventional cardiology/CTS regarding possible nonemergent thrombectomy  - Hypercoagulability workup - negative  - denies recent cancer screening  - LE venous dopplers with b/l DVT above/below the new involving popiteal vein and tibioperoneal trunk

## 2024-10-14 NOTE — DISCHARGE NOTE PROVIDER - NSFOLLOWUPCLINICS_GEN_ALL_ED_FT
[facility] - [facility] OBGYN  OBGYN  [address] [place] [address]  Phone: [phone]  Fax: [phone]  Follow Up Time: 2 weeks

## 2024-10-14 NOTE — PROGRESS NOTE ADULT - SUBJECTIVE AND OBJECTIVE BOX
INTERVAL HPI/OVERNIGHT EVENTS:  Pt seen and examined at bedside. No acute overnight events or complaints.    VITAL SIGNS:  T(F): 98.8 (10-14-24 @ 04:59)  HR: 69 (10-14-24 @ 04:59)  BP: 148/80 (10-14-24 @ 04:59)  RR: 18 (10-14-24 @ 04:59)  SpO2: 97% (10-14-24 @ 04:59)  Wt(kg): --    PHYSICAL EXAM:    CONSTITUTIONAL:  no apparent distress.  SKIN: No rashes or ecchymosis.  EYES: PERRLA. EOMI. No scleral icterus.  ENT: Supple. No discharge or glossitis. Oral mucosa with moist membranes.  CV: RRR. Normal S1 and S2. No murmurs, rubs, or gallops. No edema. Pulses equal bilaterally.  PULM: Clear to auscultation throughout. Respirations unlabored. No wheezing, rales, or rhonchi.  GI: Soft, non-tender, non-distended. No palpable masses.   MSK: No cyanosis or clubbing.  NEURO: CN grossly intact  PSYCH: A+O, appropriately communicating and responding to questions    MEDICATIONS  (STANDING):  apixaban 10 milliGRAM(s) Oral every 12 hours  aspirin  chewable 81 milliGRAM(s) Oral daily  benzonatate 100 milliGRAM(s) Oral three times a day  donepezil 5 milliGRAM(s) Oral at bedtime  metoprolol succinate ER 25 milliGRAM(s) Oral daily  pantoprazole    Tablet 40 milliGRAM(s) Oral before breakfast  polyethylene glycol 3350 17 Gram(s) Oral two times a day    MEDICATIONS  (PRN):      Allergies    No Known Allergies    Intolerances        LABS:                        9.8    7.93  )-----------( 196      ( 13 Oct 2024 06:48 )             32.4     10-13    140  |  108  |  24[H]  ----------------------------<  88  4.2   |  19[L]  |  1.60[H]    Ca    8.9      13 Oct 2024 06:48  Phos  3.1     10-13  Mg     2.3     10-13    TPro  6.9  /  Alb  3.4  /  TBili  0.3  /  DBili  x   /  AST  15  /  ALT  18  /  AlkPhos  71  10-13      Urinalysis Basic - ( 13 Oct 2024 06:48 )    Color: x / Appearance: x / SG: x / pH: x  Gluc: 88 mg/dL / Ketone: x  / Bili: x / Urobili: x   Blood: x / Protein: x / Nitrite: x   Leuk Esterase: x / RBC: x / WBC x   Sq Epi: x / Non Sq Epi: x / Bacteria: x        RADIOLOGY & ADDITIONAL TESTS:  Reviewed      ******************  Authored By: Jack Neff MD PGY1  Internal Medicine  MS Teams Preferred  ******************   INTERVAL HPI/OVERNIGHT EVENTS:  Pt seen and examined at bedside. No acute overnight events or complaints. Patient notes her breathing feels well and denies having any chest pain. Does endorse a slight cough.    VITAL SIGNS:  T(F): 98.8 (10-14-24 @ 04:59)  HR: 69 (10-14-24 @ 04:59)  BP: 148/80 (10-14-24 @ 04:59)  RR: 18 (10-14-24 @ 04:59)  SpO2: 97% (10-14-24 @ 04:59)  Wt(kg): --    PHYSICAL EXAM:    CONSTITUTIONAL:  no apparent distress.  SKIN: No rashes or ecchymosis.  EYES: PERRLA. EOMI. No scleral icterus.  ENT: Supple. No discharge or glossitis. Oral mucosa with moist membranes.  CV: RRR. Normal S1 and S2. No murmurs, rubs, or gallops. No edema. Pulses equal bilaterally.  PULM: Clear to auscultation throughout. Respirations unlabored. No wheezing, rales, or rhonchi.  GI: Soft, non-tender, non-distended. No palpable masses.   MSK: No cyanosis or clubbing.  NEURO: CN grossly intact  PSYCH: A+O, appropriately communicating and responding to questions    MEDICATIONS  (STANDING):  apixaban 10 milliGRAM(s) Oral every 12 hours  aspirin  chewable 81 milliGRAM(s) Oral daily  benzonatate 100 milliGRAM(s) Oral three times a day  donepezil 5 milliGRAM(s) Oral at bedtime  metoprolol succinate ER 25 milliGRAM(s) Oral daily  pantoprazole    Tablet 40 milliGRAM(s) Oral before breakfast  polyethylene glycol 3350 17 Gram(s) Oral two times a day    MEDICATIONS  (PRN):      Allergies    No Known Allergies    Intolerances        LABS:                        9.8    7.93  )-----------( 196      ( 13 Oct 2024 06:48 )             32.4     10-13    140  |  108  |  24[H]  ----------------------------<  88  4.2   |  19[L]  |  1.60[H]    Ca    8.9      13 Oct 2024 06:48  Phos  3.1     10-13  Mg     2.3     10-13    TPro  6.9  /  Alb  3.4  /  TBili  0.3  /  DBili  x   /  AST  15  /  ALT  18  /  AlkPhos  71  10-13      Urinalysis Basic - ( 13 Oct 2024 06:48 )    Color: x / Appearance: x / SG: x / pH: x  Gluc: 88 mg/dL / Ketone: x  / Bili: x / Urobili: x   Blood: x / Protein: x / Nitrite: x   Leuk Esterase: x / RBC: x / WBC x   Sq Epi: x / Non Sq Epi: x / Bacteria: x        RADIOLOGY & ADDITIONAL TESTS:  Reviewed      ******************  Authored By: Jack Neff MD PGY1  Internal Medicine  MS Teams Preferred  ******************

## 2024-10-14 NOTE — DISCHARGE NOTE PROVIDER - PROVIDER TOKENS
PROVIDER:[TOKEN:[[id number]],FOLLOWUP:[1 week]],PROVIDER:[TOKEN:[[id number]],FOLLOWUP:[1 week]],PROVIDER:[TOKEN:[[id number]],FOLLOWUP:[1 week]]

## 2024-10-14 NOTE — DISCHARGE NOTE PROVIDER - NSDCFUSCHEDAPPT_GEN_ALL_CORE_FT
Kristen Novoa  [facility] Physician [facility]  NEUROLOGY [address]  Scheduled Appointment: 11/12/2024

## 2024-10-14 NOTE — PROGRESS NOTE ADULT - PROBLEM SELECTOR PLAN 9
Diet: DASH  DVT ppx: eliquis  Dispo: pending medical course Diet: DASH  DVT ppx: eliquis  Dispo: pending TVUS

## 2024-10-14 NOTE — PROGRESS NOTE ADULT - PROBLEM SELECTOR PLAN 8
- unclear baseline sCr, now improving, currently 1.90 -> 1.49 -> 1.43  - monitor UOP, strict I's and O's - unclear baseline sCr, now improving, currently 1.90 -> 1.49 -> 1.43->1.60  - monitor UOP, strict I's and O's

## 2024-10-14 NOTE — DISCHARGE NOTE PROVIDER - HOSPITAL COURSE
HPI:  HPI: 83 yo F with PMH of dementia, HTN, HLD, CKD3, CAD s/p PCI to RCA with ADDISON (2019) on ASA, severe AS, mild LVH, grade II LV diastolic dysfunction, mild-mod MR, mod TR, mild pulm HTN with RVSP 30mmHg (TTE 2022, normal EF 60%), presenting from [facility] to [facility] with syncope and +LOC, found to be hypotensive to 60s/40s. Reported to c/o fatigue, SOB, N/V on presentation. In ED pt /58, , SpO2 96% on NRB 15L, RR 25, temp 99.5 rectal. Labs notable for WBC 16.32, Hgb 10.6, pro- (previous pro- on 24), trop I 27, Lactate 6.1 improved to 1.6. EKG NSR with TWI in Lead III.  CTH negative for CVA, revealing chronic white matter changes and parenchymal loss with acute sinus inflammatory changes. CTA chest with findings of saddle PE extending into right and left main pulmonary arteries and into multiple b/l lobar, segmental, and subsegmental pulmonary branches with e/o right heart strain (RV/LV ratio 1.70); also with findings of heterogeneous and thickened endometrium. Pt received 2L IVF bolus, Zosyn, IV tylenol, Zofran and started on Heparin gtt. Pt with improved hemodynamics, normotensive with improved tachycardia and weaning supplemental O2 requirements, transferred to [facility] for PERT evaluation.     On arrival to [facility] pt remains hemodynamically stable, /75, HR 85, RR 20, SpO2 99% on 2L NC, afebrile. On my exam pt denies complaints at this time, lying comfortably in bed. Pt states today she was at dinner when she was told she went pale and then "passed out." She endorses a recent dry cough, otherwise denies OLGUIN, SOB, fever, chills, n/v, chest pain, lower extremity swelling. She resides at [facility], where "everyone is coughing," ambulates with a walker at baseline and sleeps with two pillows. She admits to staying in her bed often and her daughter at bedside states she has complained of feeling more tired of recent. Pt denies recent travel. Pt states her mother  in her 40s from uterine cancer and her father in his 40s as well from a heart attack. Denies family h/o clotting disorders. She admits she is not up to date on routine cancer screenings.  (10 Oct 2024 05:03)    Hospital Course: Patient was admitted for saddle pulmonary embolus. Patient originally presented to OSH with hypotension and tachycardia, but on presentation to [facility] ED her vital signs stabilized. She was originally placed on a NRB for hypoxia, but was quickly weaned to 2LNC. TTE showed evidence of R heart strain with elevated troponins and pro-BNP. Patient was immediately started on a heparin drip and admitted to MICU. PERT team evaluated the patient and did not offer thrombectomy due to patient's stable vitals and lack of symptoms. Due to patient's stability, she was downgraded to the medical floors for observation. Patient was weaned off supplemental oxygen and was able to be transitioned to Eliquis for anticoagulation. A hypercoagulability workup was negative, however, patient was found to have increased endometrial wall thickening. A transvaginal ultrasound showed __________. After patient remained HDS throughout the weekend and tolerated Eliquis, vascular cardiology cleared the patient for discharge.      Important Medication Changes and Reason:  Start Eliquis 10 mg BID through 10/18  Start Eliquis 5 mg BID starting 10/19    Active or Pending Issues Requiring Follow-up:  Saddle Pulmonary Embolism  Increased endometrial wall thickness    Advanced Directives:   [X] Full code  [ ] DNR  [ ] Hospice    Discharge Diagnoses:  Saddle Pulmonary Embolism  Acute Hypoxic Respiratory Failure  Increased endometrial wall thickeness         HPI:  HPI: 83 yo F with PMH of dementia, HTN, HLD, CKD3, CAD s/p PCI to RCA with ADDISON (2019) on ASA, severe AS, mild LVH, grade II LV diastolic dysfunction, mild-mod MR, mod TR, mild pulm HTN with RVSP 30mmHg (TTE 2022, normal EF 60%), presenting from [facility] to [facility] with syncope and +LOC, found to be hypotensive to 60s/40s. Reported to c/o fatigue, SOB, N/V on presentation. In ED pt /58, , SpO2 96% on NRB 15L, RR 25, temp 99.5 rectal. Labs notable for WBC 16.32, Hgb 10.6, pro- (previous pro- on 24), trop I 27, Lactate 6.1 improved to 1.6. EKG NSR with TWI in Lead III.  CTH negative for CVA, revealing chronic white matter changes and parenchymal loss with acute sinus inflammatory changes. CTA chest with findings of saddle PE extending into right and left main pulmonary arteries and into multiple b/l lobar, segmental, and subsegmental pulmonary branches with e/o right heart strain (RV/LV ratio 1.70); also with findings of heterogeneous and thickened endometrium. Pt received 2L IVF bolus, Zosyn, IV tylenol, Zofran and started on Heparin gtt. Pt with improved hemodynamics, normotensive with improved tachycardia and weaning supplemental O2 requirements, transferred to [facility] for PERT evaluation.     On arrival to [facility] pt remains hemodynamically stable, /75, HR 85, RR 20, SpO2 99% on 2L NC, afebrile. On my exam pt denies complaints at this time, lying comfortably in bed. Pt states today she was at dinner when she was told she went pale and then "passed out." She endorses a recent dry cough, otherwise denies OLGUIN, SOB, fever, chills, n/v, chest pain, lower extremity swelling. She resides at [facility], where "everyone is coughing," ambulates with a walker at baseline and sleeps with two pillows. She admits to staying in her bed often and her daughter at bedside states she has complained of feeling more tired of recent. Pt denies recent travel. Pt states her mother  in her 40s from uterine cancer and her father in his 40s as well from a heart attack. Denies family h/o clotting disorders. She admits she is not up to date on routine cancer screenings.  (10 Oct 2024 05:03)    Hospital Course: Patient was admitted for saddle pulmonary embolus. Patient originally presented to OSH with hypotension and tachycardia, but on presentation to [facility] ED her vital signs stabilized. She was originally placed on a NRB for hypoxia, but was quickly weaned to 2LNC. TTE showed evidence of R heart strain with elevated troponins and pro-BNP. Patient was immediately started on a heparin drip and admitted to MICU. PERT team evaluated the patient and did not offer thrombectomy due to patient's stable vitals and lack of symptoms. Due to patient's stability, she was downgraded to the medical floors for observation. Patient was weaned off supplemental oxygen and was able to be transitioned to Eliquis for anticoagulation. A hypercoagulability workup was negative, however, patient was found to have increased endometrial wall thickening. A transvaginal ultrasound showed __________. After patient remained HDS throughout the weekend and tolerated Eliquis, vascular cardiology cleared the patient for discharge.      Important Medication Changes and Reason:  Start Eliquis 10 mg BID through 10/18  Start Eliquis 5 mg BID starting 10/19    Active or Pending Issues Requiring Follow-up:  Saddle Pulmonary Embolism  Increased endometrial wall thickness    Advanced Directives:   [X] Full code  [ ] DNR  [ ] Hospice    Discharge Diagnoses:  Saddle Pulmonary Embolism  Acute Hypoxic Respiratory Failure  Increased endometrial wall thickness         HPI:  HPI: 85 yo F with PMH of dementia, HTN, HLD, CKD3, CAD s/p PCI to RCA with ADDISON (2019) on ASA, severe AS, mild LVH, grade II LV diastolic dysfunction, mild-mod MR, mod TR, mild pulm HTN with RVSP 30mmHg (TTE 2022, normal EF 60%), presenting from [facility] to [facility] with syncope and +LOC, found to be hypotensive to 60s/40s. Reported to c/o fatigue, SOB, N/V on presentation. In ED pt /58, , SpO2 96% on NRB 15L, RR 25, temp 99.5 rectal. Labs notable for WBC 16.32, Hgb 10.6, pro- (previous pro- on 24), trop I 27, Lactate 6.1 improved to 1.6. EKG NSR with TWI in Lead III.  CTH negative for CVA, revealing chronic white matter changes and parenchymal loss with acute sinus inflammatory changes. CTA chest with findings of saddle PE extending into right and left main pulmonary arteries and into multiple b/l lobar, segmental, and subsegmental pulmonary branches with e/o right heart strain (RV/LV ratio 1.70); also with findings of heterogeneous and thickened endometrium. Pt received 2L IVF bolus, Zosyn, IV tylenol, Zofran and started on Heparin gtt. Pt with improved hemodynamics, normotensive with improved tachycardia and weaning supplemental O2 requirements, transferred to [facility] for PERT evaluation.     On arrival to [facility] pt remains hemodynamically stable, /75, HR 85, RR 20, SpO2 99% on 2L NC, afebrile. On my exam pt denies complaints at this time, lying comfortably in bed. Pt states today she was at dinner when she was told she went pale and then "passed out." She endorses a recent dry cough, otherwise denies OLGUIN, SOB, fever, chills, n/v, chest pain, lower extremity swelling. She resides at [facility], where "everyone is coughing," ambulates with a walker at baseline and sleeps with two pillows. She admits to staying in her bed often and her daughter at bedside states she has complained of feeling more tired of recent. Pt denies recent travel. Pt states her mother  in her 40s from uterine cancer and her father in his 40s as well from a heart attack. Denies family h/o clotting disorders. She admits she is not up to date on routine cancer screenings.  (10 Oct 2024 05:03)    Hospital Course: Patient was admitted for saddle pulmonary embolus. Patient originally presented to OSH with hypotension and tachycardia, but on presentation to [facility] ED her vital signs stabilized. She was originally placed on a NRB for hypoxia, but was quickly weaned to 2LNC. LE duplex positive for acute above knee RLE DVT and old LE DVT. TTE showed evidence of R heart strain with elevated troponins and pro-BNP. Patient was immediately started on a heparin drip and admitted to MICU. PERT team evaluated the patient and did not offer thrombectomy due to patient's stable vitals and lack of symptoms. Due to patient's stability, she was downgraded to the medical floors for observation. Patient was weaned off supplemental oxygen and was able to be transitioned to Eliquis for anticoagulation. A hypercoagulability workup was negative, however, patient was found to have increased endometrial wall thickening. A transvaginal ultrasound showed __________. After patient remained HDS throughout the weekend and tolerated Eliquis, vascular cardiology cleared the patient for discharge.      Important Medication Changes and Reason:  Start Eliquis 10 mg BID through 10/18  Start Eliquis 5 mg BID starting 10/19    Active or Pending Issues Requiring Follow-up:  Saddle Pulmonary Embolism  Increased endometrial wall thickness    Advanced Directives:   [X] Full code  [ ] DNR  [ ] Hospice    Discharge Diagnoses:  Saddle Pulmonary Embolism  Acute Hypoxic Respiratory Failure  Increased endometrial wall thickness         HPI:  HPI: 83 yo F with PMH of dementia, HTN, HLD, CKD3, CAD s/p PCI to RCA with ADDISON (2019) on ASA, severe AS, mild LVH, grade II LV diastolic dysfunction, mild-mod MR, mod TR, mild pulm HTN with RVSP 30mmHg (TTE 2022, normal EF 60%), presenting from [facility] to [facility] with syncope and +LOC, found to be hypotensive to 60s/40s. Reported to c/o fatigue, SOB, N/V on presentation. In ED pt /58, , SpO2 96% on NRB 15L, RR 25, temp 99.5 rectal. Labs notable for WBC 16.32, Hgb 10.6, pro- (previous pro- on 24), trop I 27, Lactate 6.1 improved to 1.6. EKG NSR with TWI in Lead III.  CTH negative for CVA, revealing chronic white matter changes and parenchymal loss with acute sinus inflammatory changes. CTA chest with findings of saddle PE extending into right and left main pulmonary arteries and into multiple b/l lobar, segmental, and subsegmental pulmonary branches with e/o right heart strain (RV/LV ratio 1.70); also with findings of heterogeneous and thickened endometrium. Pt received 2L IVF bolus, Zosyn, IV tylenol, Zofran and started on Heparin gtt. Pt with improved hemodynamics, normotensive with improved tachycardia and weaning supplemental O2 requirements, transferred to [facility] for PERT evaluation.     On arrival to [facility] pt remains hemodynamically stable, /75, HR 85, RR 20, SpO2 99% on 2L NC, afebrile. On my exam pt denies complaints at this time, lying comfortably in bed. Pt states today she was at dinner when she was told she went pale and then "passed out." She endorses a recent dry cough, otherwise denies OLGUIN, SOB, fever, chills, n/v, chest pain, lower extremity swelling. She resides at [facility], where "everyone is coughing," ambulates with a walker at baseline and sleeps with two pillows. She admits to staying in her bed often and her daughter at bedside states she has complained of feeling more tired of recent. Pt denies recent travel. Pt states her mother  in her 40s from uterine cancer and her father in his 40s as well from a heart attack. Denies family h/o clotting disorders. She admits she is not up to date on routine cancer screenings.  (10 Oct 2024 05:03)    Hospital Course: Patient was admitted for saddle pulmonary embolus. Patient originally presented to OSH with hypotension and tachycardia, but on presentation to [facility] ED her vital signs stabilized. She was originally placed on a NRB for hypoxia, but was quickly weaned to 2LNC. LE duplex positive for acute above knee RLE DVT and old LE DVT. TTE showed evidence of R heart strain with elevated troponins and pro-BNP. Patient was immediately started on a heparin drip and admitted to MICU. PERT team evaluated the patient and did not offer thrombectomy due to patient's stable vitals and lack of symptoms. Due to patient's stability, she was downgraded to the medical floors for observation. Patient was weaned off supplemental oxygen and was able to be transitioned to Eliquis for anticoagulation. A hypercoagulability workup was negative, however, patient was found to have increased endometrial wall thickening. A transvaginal ultrasound showed endometrial thickening with multiple cystic regions, requiring f/u for an EMB. After patient remained HDS throughout the weekend and tolerated Eliquis, vascular cardiology cleared the patient for discharge.      Important Medication Changes and Reason:  Start Eliquis 10 mg BID through 10/18  Start Eliquis 5 mg BID starting 10/19    Active or Pending Issues Requiring Follow-up:  Saddle Pulmonary Embolism  Increased endometrial wall thickness, needs EMB    Advanced Directives:   [X] Full code  [ ] DNR  [ ] Hospice    Discharge Diagnoses:  Saddle Pulmonary Embolism  Acute Hypoxic Respiratory Failure  Increased endometrial wall thickness         HPI:  HPI: 83 yo F with PMH of dementia, HTN, HLD, CKD3, CAD s/p PCI to RCA with ADDISON (2019) on ASA, severe AS, mild LVH, grade II LV diastolic dysfunction, mild-mod MR, mod TR, mild pulm HTN with RVSP 30mmHg (TTE 2022, normal EF 60%), presenting from [facility] to [facility] with syncope and +LOC, found to be hypotensive to 60s/40s. Reported to c/o fatigue, SOB, N/V on presentation. In ED pt /58, , SpO2 96% on NRB 15L, RR 25, temp 99.5 rectal. Labs notable for WBC 16.32, Hgb 10.6, pro- (previous pro- on 24), trop I 27, Lactate 6.1 improved to 1.6. EKG NSR with TWI in Lead III.  CTH negative for CVA, revealing chronic white matter changes and parenchymal loss with acute sinus inflammatory changes. CTA chest with findings of saddle PE extending into right and left main pulmonary arteries and into multiple b/l lobar, segmental, and subsegmental pulmonary branches with e/o right heart strain (RV/LV ratio 1.70); also with findings of heterogeneous and thickened endometrium. Pt received 2L IVF bolus, Zosyn, IV tylenol, Zofran and started on Heparin gtt. Pt with improved hemodynamics, normotensive with improved tachycardia and weaning supplemental O2 requirements, transferred to [facility] for PERT evaluation.     On arrival to [facility] pt remains hemodynamically stable, /75, HR 85, RR 20, SpO2 99% on 2L NC, afebrile. Pt denied complaints at this time, lying comfortably in bed. Pt states she was at dinner when she was told she went pale and then "passed out." She endorses a recent dry cough, otherwise denies OLGUIN, SOB, fever, chills, n/v, chest pain, lower extremity swelling. She resides at [facility], where "everyone is coughing," ambulates with a walker at baseline and sleeps with two pillows. She admits to staying in her bed often and her daughter at bedside states she has complained of feeling more tired of recent. Pt denies recent travel. Pt states her mother  in her 40s from uterine cancer and her father in his 40s as well from a heart attack. Denies family h/o clotting disorders. She admits she is not up to date on routine cancer screenings.  (10 Oct 2024 05:03)    Hospital Course: Patient was admitted for saddle pulmonary embolus. Patient originally presented to OSH with hypotension and tachycardia, but on presentation to [facility] ED her vital signs stabilized. She was originally placed on a NRB for hypoxia, but was quickly weaned to 2LNC. LE duplex positive for acute above knee RLE DVT and old LE DVT. TTE showed evidence of R heart strain with elevated troponins and pro-BNP. Patient was immediately started on a heparin drip and admitted to MICU. PERT team evaluated the patient and did not offer thrombectomy due to patient's stable vitals and lack of symptoms. Due to patient's stability, she was downgraded to the medical floors for observation. Patient was weaned off supplemental oxygen and was able to be transitioned to Eliquis for anticoagulation. A hypercoagulability workup was negative, however, patient was found to have increased endometrial wall thickening. A transvaginal ultrasound showed endometrial thickening with multiple cystic regions, requiring f/u for an EMB. After patient remained HDS throughout the weekend and tolerated Eliquis, vascular cardiology cleared the patient for discharge.      Important Medication Changes and Reason:  Start Eliquis 10 mg BID through 10/18  Start Eliquis 5 mg BID starting 10/19    Active or Pending Issues Requiring Follow-up:  Saddle Pulmonary Embolism  Increased endometrial wall thickness, needs EMB    Advanced Directives:   [X] Full code  [ ] DNR  [ ] Hospice    Discharge Diagnoses:  Saddle Pulmonary Embolism  Acute Hypoxic Respiratory Failure  Increased endometrial wall thickness         85 yo F with PMH of dementia, HTN, HLD, CKD3, CAD s/p PCI to RCA with ADDISON (2019) on ASA, severe AS, mild LVH, grade II LV diastolic dysfunction, mild-mod MR, mod TR, mild pulm HTN with RVSP 30mmHg (TTE 2022, normal EF 60%), presenting from [facility] to [facility] with syncope and +LOC, found to be hypotensive to 60s/40s. Reported to c/o fatigue, SOB, N/V on presentation.     In ED pt /58, , SpO2 96% on NRB 15L, RR 25, temp 99.5 rectal. Labs notable for WBC 16.32, Hgb 10.6, pro- (previous pro- on 24), trop I 27, Lactate 6.1 improved to 1.6. EKG NSR with TWI in Lead III.  CTH negative for CVA, revealing chronic white matter changes and parenchymal loss with acute sinus inflammatory changes. CTA chest with findings of saddle PE extending into right and left main pulmonary arteries and into multiple b/l lobar, segmental, and subsegmental pulmonary branches with e/o right heart strain (RV/LV ratio 1.70); also with findings of heterogeneous and thickened endometrium. Pt received 2L IVF bolus, Zosyn, IV tylenol, Zofran and started on Heparin gtt. Pt with improved hemodynamics, normotensive with improved tachycardia and weaning supplemental O2 requirements, transferred to [facility] for PERT evaluation.     On arrival to [facility] pt remains hemodynamically stable, /75, HR 85, RR 20, SpO2 99% on 2L NC, afebrile. Pt denied complaints at this time, lying comfortably in bed. Pt states she was at dinner when she was told she went pale and then "passed out." She endorses a recent dry cough, otherwise denies OLGUIN, SOB, fever, chills, n/v, chest pain, lower extremity swelling. She resides at [facility], where "everyone is coughing," ambulates with a walker at baseline and sleeps with two pillows. She admits to staying in her bed often and her daughter at bedside states she has complained of feeling more tired of recent. Pt denies recent travel. Pt states her mother  in her 40s from uterine cancer and her father in his 40s as well from a heart attack. Denies family h/o clotting disorders. She admits she is not up to date on routine cancer screenings.  (10 Oct 2024 05:03)    Hospital Course: Patient was admitted for saddle pulmonary embolus. Patient originally presented to OSH with hypotension and tachycardia, but on presentation to [facility] ED her vital signs stabilized. She was originally placed on a NRB for hypoxia, but was quickly weaned to 2LNC. LE duplex positive for acute above knee RLE DVT and old LE DVT.   TTE showed evidence of R heart strain with elevated troponins and pro-BNP. Patient was started on a heparin drip and admitted to MICU. PERT team evaluated the patient and did not offer thrombectomy due to patient's stable vitals and lack of symptoms. Due to patient's stability, she was downgraded to the medical floors for observation. Patient was weaned off supplemental oxygen and was able to be transitioned to Eliquis for anticoagulation.     An initial hypercoagulability workup was negative, however, patient was found to have increased endometrial wall thickening. A transvaginal ultrasound showed endometrial thickening with multiple cystic regions, requiring f/u for an EMB. After patient remained HDS throughout the weekend and tolerated Eliquis, vascular cardiology cleared the patient for discharge.    Pt and family advised close follow up with gyn for further workup.       Important Medication Changes and Reason:  Start Eliquis 10 mg BID through 10/18  Start Eliquis 5 mg BID starting 10/19    Active or Pending Issues Requiring Follow-up:  Saddle Pulmonary Embolism  Increased endometrial wall thickness, needs EMB    Advanced Directives:   [X] Full code  [ ] DNR  [ ] Hospice    Discharge Diagnoses:  Saddle Pulmonary Embolism  Acute Hypoxic Respiratory Failure  Increased endometrial wall thickness

## 2024-10-14 NOTE — DISCHARGE NOTE PROVIDER - CARE PROVIDER_API CALL
Judith Valera  Interventional Cardiology  [address] [place] [address]  Phone: [phone]  Fax: [phone]  Follow Up Time: 1 week    Kush Ortega  Cardiovascular Disease  [address] [facility], [address] [place] [address]  Phone: [phone]  Fax: [phone]  Follow Up Time: 1 week    Kirk Lemon  Interventional Cardiology  [address] [facility], [address] [place] [address]  Phone: [phone]  Fax: [phone]  Follow Up Time: 1 week

## 2024-10-14 NOTE — DISCHARGE NOTE PROVIDER - NSDCFUADDAPPT_GEN_ALL_CORE_FT
APPTS ARE READY TO BE MADE: [ ] YES    Best Family or Patient Contact (if needed):    Additional Information about above appointments (if needed):    1:   2:   3:     Other comments or requests:    APPTS ARE READY TO BE MADE: [ ] YES    Best Family or Patient Contact (if needed):    Additional Information about above appointments (if needed):    1: Cardiology with either Dr. Kojo Gonzalez, Dr. Kush Ortega, or Dr. Kirk Lemon. Whoever has the earliest appointment within the next 1-2 weeks  2: Gynecology  3: PCP at [facility]    Other comments or requests:    APPTS ARE READY TO BE MADE: [X] YES    Best Family or Patient Contact (if needed):    Additional Information about above appointments (if needed):    1: Cardiology with either Dr. Kojo Gonzalez, Dr. Kush Ortega, or Dr. Kirk Lemon. Whoever has the earliest appointment within the next 1-2 weeks  2: Gynecology  3: PCP at [facility]    Other comments or requests:    APPTS ARE READY TO BE MADE: [X] YES    Best Family or Patient Contact (if needed):    Additional Information about above appointments (if needed):    1: Cardiology with either Dr. Kojo Gonzalez, Dr. Kush Ortega, or Dr. Kirk Lemon. Whoever has the earliest appointment within the next 1-2 weeks  2: Gynecology  3: PCP at [facility]    Other comments or requests:     Patient advised they did not want to proceed with scheduling appointments with the providers on their referrals. They will coordinate care on their own.

## 2024-10-15 ENCOUNTER — NON-APPOINTMENT (OUTPATIENT)
Age: 84
End: 2024-10-15

## 2024-10-15 ENCOUNTER — TRANSCRIPTION ENCOUNTER (OUTPATIENT)
Age: 84
End: 2024-10-15

## 2024-10-15 VITALS
HEART RATE: 80 BPM | DIASTOLIC BLOOD PRESSURE: 74 MMHG | TEMPERATURE: 99 F | RESPIRATION RATE: 18 BRPM | OXYGEN SATURATION: 95 % | SYSTOLIC BLOOD PRESSURE: 115 MMHG

## 2024-10-15 LAB
ALBUMIN SERPL ELPH-MCNC: 3.6 G/DL — SIGNIFICANT CHANGE UP (ref 3.3–5)
ALP SERPL-CCNC: 70 U/L — SIGNIFICANT CHANGE UP (ref 40–120)
ALT FLD-CCNC: 16 U/L — SIGNIFICANT CHANGE UP (ref 10–45)
ANION GAP SERPL CALC-SCNC: 15 MMOL/L — SIGNIFICANT CHANGE UP (ref 5–17)
AST SERPL-CCNC: 38 U/L — SIGNIFICANT CHANGE UP (ref 10–40)
BASOPHILS # BLD AUTO: 0.05 K/UL — SIGNIFICANT CHANGE UP (ref 0–0.2)
BASOPHILS NFR BLD AUTO: 0.5 % — SIGNIFICANT CHANGE UP (ref 0–2)
BILIRUB SERPL-MCNC: 0.4 MG/DL — SIGNIFICANT CHANGE UP (ref 0.2–1.2)
BUN SERPL-MCNC: 26 MG/DL — HIGH (ref 7–23)
CALCIUM SERPL-MCNC: 9.1 MG/DL — SIGNIFICANT CHANGE UP (ref 8.4–10.5)
CHLORIDE SERPL-SCNC: 104 MMOL/L — SIGNIFICANT CHANGE UP (ref 96–108)
CO2 SERPL-SCNC: 19 MMOL/L — LOW (ref 22–31)
CREAT SERPL-MCNC: 1.42 MG/DL — HIGH (ref 0.5–1.3)
CULTURE RESULTS: SIGNIFICANT CHANGE UP
CULTURE RESULTS: SIGNIFICANT CHANGE UP
EGFR: 36 ML/MIN/1.73M2 — LOW
EOSINOPHIL # BLD AUTO: 0.33 K/UL — SIGNIFICANT CHANGE UP (ref 0–0.5)
EOSINOPHIL NFR BLD AUTO: 3.3 % — SIGNIFICANT CHANGE UP (ref 0–6)
GLUCOSE SERPL-MCNC: 88 MG/DL — SIGNIFICANT CHANGE UP (ref 70–99)
HCT VFR BLD CALC: 37.6 % — SIGNIFICANT CHANGE UP (ref 34.5–45)
HGB BLD-MCNC: 11.5 G/DL — SIGNIFICANT CHANGE UP (ref 11.5–15.5)
IMM GRANULOCYTES NFR BLD AUTO: 0.5 % — SIGNIFICANT CHANGE UP (ref 0–0.9)
LYMPHOCYTES # BLD AUTO: 1.7 K/UL — SIGNIFICANT CHANGE UP (ref 1–3.3)
LYMPHOCYTES # BLD AUTO: 17.1 % — SIGNIFICANT CHANGE UP (ref 13–44)
MAGNESIUM SERPL-MCNC: 2.4 MG/DL — SIGNIFICANT CHANGE UP (ref 1.6–2.6)
MCHC RBC-ENTMCNC: 28.8 PG — SIGNIFICANT CHANGE UP (ref 27–34)
MCHC RBC-ENTMCNC: 30.6 GM/DL — LOW (ref 32–36)
MCV RBC AUTO: 94.2 FL — SIGNIFICANT CHANGE UP (ref 80–100)
MONOCYTES # BLD AUTO: 0.62 K/UL — SIGNIFICANT CHANGE UP (ref 0–0.9)
MONOCYTES NFR BLD AUTO: 6.2 % — SIGNIFICANT CHANGE UP (ref 2–14)
NEUTROPHILS # BLD AUTO: 7.2 K/UL — SIGNIFICANT CHANGE UP (ref 1.8–7.4)
NEUTROPHILS NFR BLD AUTO: 72.4 % — SIGNIFICANT CHANGE UP (ref 43–77)
NRBC # BLD: 0 /100 WBCS — SIGNIFICANT CHANGE UP (ref 0–0)
PHOSPHATE SERPL-MCNC: 4 MG/DL — SIGNIFICANT CHANGE UP (ref 2.5–4.5)
PLATELET # BLD AUTO: 212 K/UL — SIGNIFICANT CHANGE UP (ref 150–400)
POTASSIUM SERPL-MCNC: 5.5 MMOL/L — HIGH (ref 3.5–5.3)
POTASSIUM SERPL-SCNC: 5.5 MMOL/L — HIGH (ref 3.5–5.3)
PROT SERPL-MCNC: 7.4 G/DL — SIGNIFICANT CHANGE UP (ref 6–8.3)
RBC # BLD: 3.99 M/UL — SIGNIFICANT CHANGE UP (ref 3.8–5.2)
RBC # FLD: 15.6 % — HIGH (ref 10.3–14.5)
SODIUM SERPL-SCNC: 138 MMOL/L — SIGNIFICANT CHANGE UP (ref 135–145)
SPECIMEN SOURCE: SIGNIFICANT CHANGE UP
SPECIMEN SOURCE: SIGNIFICANT CHANGE UP
WBC # BLD: 9.95 K/UL — SIGNIFICANT CHANGE UP (ref 3.8–10.5)
WBC # FLD AUTO: 9.95 K/UL — SIGNIFICANT CHANGE UP (ref 3.8–10.5)

## 2024-10-15 PROCEDURE — 99239 HOSP IP/OBS DSCHRG MGMT >30: CPT

## 2024-10-15 RX ORDER — METOPROLOL TARTRATE 50 MG
1 TABLET ORAL
Refills: 0 | DISCHARGE

## 2024-10-15 RX ORDER — ATORVASTATIN CALCIUM 10 MG/1
1 TABLET, FILM COATED ORAL
Refills: 0 | DISCHARGE

## 2024-10-15 RX ORDER — METOPROLOL TARTRATE 50 MG
1 TABLET ORAL
Qty: 0 | Refills: 0 | DISCHARGE
Start: 2024-10-15

## 2024-10-15 RX ORDER — BENZONATATE 150 MG/1
1 CAPSULE ORAL
Qty: 0 | Refills: 0 | DISCHARGE
Start: 2024-10-15

## 2024-10-15 RX ORDER — APIXABAN 5 MG/1
2 TABLET, FILM COATED ORAL
Qty: 120 | Refills: 0
Start: 2024-10-15 | End: 2024-11-13

## 2024-10-15 RX ORDER — APIXABAN 5 MG/1
2 TABLET, FILM COATED ORAL
Qty: 0 | Refills: 0 | DISCHARGE
Start: 2024-10-15 | End: 2024-10-19

## 2024-10-15 RX ORDER — PANTOPRAZOLE SODIUM 40 MG/1
1 TABLET, DELAYED RELEASE ORAL
Refills: 0 | DISCHARGE

## 2024-10-15 RX ADMIN — Medication 17 GRAM(S): at 18:01

## 2024-10-15 RX ADMIN — Medication 25 MILLIGRAM(S): at 05:33

## 2024-10-15 RX ADMIN — Medication 81 MILLIGRAM(S): at 12:51

## 2024-10-15 RX ADMIN — Medication 17 GRAM(S): at 05:33

## 2024-10-15 RX ADMIN — BENZONATATE 100 MILLIGRAM(S): 150 CAPSULE ORAL at 05:33

## 2024-10-15 RX ADMIN — APIXABAN 10 MILLIGRAM(S): 5 TABLET, FILM COATED ORAL at 09:33

## 2024-10-15 RX ADMIN — PANTOPRAZOLE SODIUM 40 MILLIGRAM(S): 40 TABLET, DELAYED RELEASE ORAL at 05:34

## 2024-10-15 RX ADMIN — BENZONATATE 100 MILLIGRAM(S): 150 CAPSULE ORAL at 12:52

## 2024-10-15 NOTE — PROGRESS NOTE ADULT - SUBJECTIVE AND OBJECTIVE BOX
INTERVAL HPI/OVERNIGHT EVENTS:  Pt seen and examined at bedside. No acute overnight events or complaints.    VITAL SIGNS:  T(F): 97.5 (10-15-24 @ 00:26)  HR: 71 (10-15-24 @ 00:26)  BP: 143/70 (10-15-24 @ 00:26)  RR: 18 (10-15-24 @ 00:26)  SpO2: 98% (10-15-24 @ 00:26)  Wt(kg): --    PHYSICAL EXAM:    CONSTITUTIONAL:  no apparent distress.  SKIN: No rashes or ecchymosis.  EYES: PERRLA. EOMI. No scleral icterus.  ENT: Supple. No discharge or glossitis. Oral mucosa with moist membranes.  CV: RRR. Normal S1 and S2. No murmurs, rubs, or gallops. No edema. Pulses equal bilaterally.  PULM: Clear to auscultation throughout. Respirations unlabored. No wheezing, rales, or rhonchi.  GI: Soft, non-tender, non-distended. No palpable masses.   MSK: No cyanosis or clubbing.  NEURO: CN grossly intact  PSYCH: A+O, appropriately communicating and responding to questions    MEDICATIONS  (STANDING):  apixaban 10 milliGRAM(s) Oral every 12 hours  aspirin  chewable 81 milliGRAM(s) Oral daily  benzonatate 100 milliGRAM(s) Oral three times a day  donepezil 5 milliGRAM(s) Oral at bedtime  metoprolol succinate ER 25 milliGRAM(s) Oral daily  pantoprazole    Tablet 40 milliGRAM(s) Oral before breakfast  polyethylene glycol 3350 17 Gram(s) Oral two times a day    MEDICATIONS  (PRN):      Allergies    No Known Allergies    Intolerances        LABS:                RADIOLOGY & ADDITIONAL TESTS:  Reviewed      ******************  Authored By: Jack Neff MD PGY1  Internal Medicine  MS Teams Preferred  ******************   INTERVAL HPI/OVERNIGHT EVENTS:  Pt seen and examined at bedside. No acute overnight events or complaints. Patient feels well with only a slight cough. Denies fatigue, SOB, CP, dizziness.    VITAL SIGNS:  T(F): 97.5 (10-15-24 @ 00:26)  HR: 71 (10-15-24 @ 00:26)  BP: 143/70 (10-15-24 @ 00:26)  RR: 18 (10-15-24 @ 00:26)  SpO2: 98% (10-15-24 @ 00:26)  Wt(kg): --    PHYSICAL EXAM:    CONSTITUTIONAL:  no apparent distress.  SKIN: No rashes or ecchymosis.  EYES: PERRLA. EOMI. No scleral icterus.  ENT: Supple. No discharge or glossitis. Oral mucosa with moist membranes.  CV: RRR. Normal S1 and S2. No murmurs, rubs, or gallops. No edema. Pulses equal bilaterally.  PULM: Clear to auscultation throughout. Respirations unlabored. No wheezing, rales, or rhonchi.  GI: Soft, non-tender, non-distended. No palpable masses.   MSK: No cyanosis or clubbing.  NEURO: CN grossly intact  PSYCH: A+O, appropriately communicating and responding to questions    MEDICATIONS  (STANDING):  apixaban 10 milliGRAM(s) Oral every 12 hours  aspirin  chewable 81 milliGRAM(s) Oral daily  benzonatate 100 milliGRAM(s) Oral three times a day  donepezil 5 milliGRAM(s) Oral at bedtime  metoprolol succinate ER 25 milliGRAM(s) Oral daily  pantoprazole    Tablet 40 milliGRAM(s) Oral before breakfast  polyethylene glycol 3350 17 Gram(s) Oral two times a day    MEDICATIONS  (PRN):      Allergies    No Known Allergies    Intolerances        LABS:                RADIOLOGY & ADDITIONAL TESTS:  Reviewed      ******************  Authored By: Jack Neff MD PGY1  Internal Medicine  MS Teams Preferred  ******************

## 2024-10-15 NOTE — DISCHARGE NOTE NURSING/CASE MANAGEMENT/SOCIAL WORK - NSDCVIVACCINE_GEN_ALL_CORE_FT
influenza, injectable, quadrivalent, preservative free; 12-Nov-2019 13:36; Ketty Wall (SELWYN); [employer];  (Exp. Date: 30-Jun-2020); IntraMuscular; Deltoid Left.; 0.5 milliLiter(s); VIS (VIS Published: 15-Aug-2019, VIS Presented: 12-Nov-2019);

## 2024-10-15 NOTE — DISCHARGE NOTE NURSING/CASE MANAGEMENT/SOCIAL WORK - NSDCFUADDAPPT_GEN_ALL_CORE_FT
APPTS ARE READY TO BE MADE: [X] YES    Best Family or Patient Contact (if needed):    Additional Information about above appointments (if needed):    1: Cardiology with either Dr. Kojo Gonzalez, Dr. Kush Ortega, or Dr. Kirk Lemon. Whoever has the earliest appointment within the next 1-2 weeks  2: Gynecology  3: PCP at [facility]    Other comments or requests:

## 2024-10-15 NOTE — DISCHARGE NOTE NURSING/CASE MANAGEMENT/SOCIAL WORK - PATIENT PORTAL LINK FT
You can access the FollowMyHealth Patient Portal offered by [facility] by registering at the following website: [email or web address]. By joining [employer]’s FollowMyHealth portal, you will also be able to view your health information using other applications (apps) compatible with our system.

## 2024-10-15 NOTE — PROGRESS NOTE ADULT - REASON FOR ADMISSION
Pulmonary Embolism
Handoff

## 2024-10-15 NOTE — PROGRESS NOTE ADULT - PROBLEM SELECTOR PLAN 9
Diet: DASH  DVT ppx: eliquis  Dispo: pending TVUS Diet: DASH  DVT ppx: eliquis  Dispo: d/c to [facility] today

## 2024-10-15 NOTE — PROGRESS NOTE ADULT - PROBLEM SELECTOR PLAN 4
- previously deferred evaluation of TAVR   - will hold further IVF  - f/u Cardiology recommendations

## 2024-10-15 NOTE — PROGRESS NOTE ADULT - PROBLEM SELECTOR PLAN 5
- s/p PCI to RCA with ADDISON in 2019   - continue ASA

## 2024-10-15 NOTE — PROGRESS NOTE ADULT - PROVIDER SPECIALTY LIST ADULT
Critical Care
Critical Care
Internal Medicine

## 2024-10-15 NOTE — DISCHARGE NOTE NURSING/CASE MANAGEMENT/SOCIAL WORK - NSDCPEFALRISK_GEN_ALL_CORE
For information on Fall & Injury Prevention, visit: [email or web address] OR  [email or web address] OR  https://www.cdc.gov/steadi/patient.html

## 2024-10-15 NOTE — PROGRESS NOTE ADULT - PROBLEM SELECTOR PLAN 7
h/o dementia, as per family pt is forgetful at baseline however AAOx3, now p/w syncope   - syncope 2/2 PE, CTH at [facility] with chronic white matter changes and parenchymal loss   - continue home Donepezil  - Neuro checks q4 hrs   - PT/OT - no skilled PT needs

## 2024-10-15 NOTE — PROGRESS NOTE ADULT - ATTENDING COMMENTS
84F from [facility] w dementia, HTN, HLD, CKD3, CAD s/p ADDISON on ASA, severe AS, HFpEF, initially p/w syncope/LOC hypotensive to [facility] where she was found with obstructive shock 2/2 high risk saddle PE w b/l extension, RHS, b/l DVTs, transferred to [facility] for PERT evaluation, admitted to [facility] MICU for monitoring, clinically stable, downgraded to medicine floors, transitioned to apixaban.    Patient continues. C/w observation during treatment of intermediate-high risk PE. Per PERT team, no indication for advanced therapies at this time given her stability. Unclear of the etiology of the extensive PE and DVTs, APLS workup negative. Noted to have endometrial thickening, currently pending transvaginal US to determine presence of gynecological pathology. Follow-up with PERT team regarding duration of observation period, any possible interventions. Continue to trend CBC while on AC (currently stable, no bleeding noted).
84F from [facility] w dementia, HTN, HLD, CKD3, CAD s/p ADDISON on ASA, severe AS, HFpEF, initially p/w syncope/LOC hypotensive to [facility] where she was found with obstructive shock 2/2 high risk saddle PE w b/l extension, RHS, b/l DVTs, transferred to [facility] for PERT evaluation, admitted to [facility] MICU for monitoring, clinically stable, downgraded to medicine floors, transitioned to apixaban.    Transvaginal u/s noted- outpatient f/u w gyn.     D/c home today. D/c time 40 mins.
84F from [facility] w dementia, HTN, HLD, CKD3, CAD s/p ADDISON on ASA, severe AS, HFpEF, initially p/w syncope/LOC hypotensive to [facility] where she was found with obstructive shock 2/2 high risk saddle PE w b/l extension, RHS, b/l DVTs, transferred to [facility] for PERT evaluation, admitted to [facility] MICU for monitoring.     Pt now stable on hep gtt, HDS on RA- transferred to floor. Incidentally found w endometrial thickening concerning given maternal h/o uterine cancer- f/u pelvic u/s.     Per Vascular Cardiology- Intermediate high risk PE. No indication for advanced therapies given clinical stability, age and co-morbidities. Can transition to DOAC with run-in.
84F from [facility] w dementia, HTN, HLD, CKD3, CAD s/p ADDISON on ASA, severe AS, HFpEF, initially p/w syncope/LOC hypotensive to [facility] where she was found with obstructive shock 2/2 high risk saddle PE w b/l extension, RHS, b/l DVTs, transferred to [facility] for PERT evaluation, admitted to [facility] MICU for monitoring, clinically stable, downgraded to medicine floors, transitioned to apixaban.    Patient evaluated at bedside, endorses wellness, denies any acute complaints including chest pain, SOB, hemoptysis, lightheadedness, dizziness, palpitations. C/w observation during treatment of intermediate-high risk PE. Per PERT team, no indication for advanced therapies at this time given her stability. Unclear of the etiology of the extensive PE and DVTs, APLS workup negative. Noted to have endometrial thickening, currently pending transvaginal US to determine presence of gynecological pathology. Follow-up with PERT team regarding duration of observation period. Continue to trend CBC while on AC.
84F from [facility] w dementia, HTN, HLD, CKD3, CAD s/p ADDISON on ASA, severe AS, HFpEF, initially p/w syncope/LOC hypotensive to [facility] where she was found with obstructive shock 2/2 high risk saddle PE w b/l extension, RHS, b/l DVTs, transferred to [facility] for PERT evaluation, admitted to [facility] MICU for monitoring, clinically stable, downgraded to medicine floors, transitioned to apixaban.    Unclear of the etiology of the extensive PE and DVTs, APLS workup negative. Noted to have endometrial thickening, currently pending transvaginal US to determine presence of gynecological pathology.

## 2024-10-15 NOTE — PROGRESS NOTE ADULT - PROBLEM SELECTOR PROBLEM 3
Increased endometrial stripe thickness

## 2024-10-15 NOTE — PROGRESS NOTE ADULT - PROBLEM SELECTOR PLAN 1
- unclear provoking factor, denies h/o clotting disorders  - Hypotensive likely 2/2 obstructive shock i/s/o saddle pulmonary embolism and underlying aortic stenosis; intermediate high risk PE with +RHS and elevated biomarkers  - Currently hemodynamically stable  - hold home antihypertensives  - troponin 391 --> 359 -->304, pro-BNP 1331-->2306;   - TTE report shows IV septum flattening, enlarged RV size, reduced RV function with apical sparing (Grace's sign), and dilated IVC. LV function visually normal with estimated EF 60-65%.  - S/p Heparin gtt  - C/w Eliquis 10mg BID  - F/u Vascular and interventional cardiology/CTS regarding possible nonemergent thrombectomy  - Hypercoagulability workup - negative  - denies recent cancer screening  - LE venous dopplers with b/l DVT above/below the new involving popiteal vein and tibioperoneal trunk - unclear provoking factor, denies h/o clotting disorders  - Hypotensive likely 2/2 obstructive shock i/s/o saddle pulmonary embolism and underlying aortic stenosis; intermediate high risk PE with +RHS and elevated biomarkers  - Currently hemodynamically stable  - hold home antihypertensives  - troponin 391 --> 359 -->304, pro-BNP 1331-->2306;   - TTE report shows IV septum flattening, enlarged RV size, reduced RV function with apical sparing (Grace's sign), and dilated IVC. LV function visually normal with estimated EF 60-65%.  - S/p Heparin gtt  - C/w Eliquis 10mg BID through 10/18, then start 5 mg BID on 10/19  - F/u Vascular and interventional cardiology/CTS regarding possible nonemergent thrombectomy  - Hypercoagulability workup - negative  - denies recent cancer screening  - LE venous dopplers with b/l DVT above/below the new involving popiteal vein and tibioperoneal trunk

## 2024-10-15 NOTE — PROGRESS NOTE ADULT - PROBLEM SELECTOR PLAN 8
- unclear baseline sCr, now improving, currently 1.90 -> 1.49 -> 1.43->1.60  - monitor UOP, strict I's and O's

## 2024-10-15 NOTE — PROGRESS NOTE ADULT - PROBLEM SELECTOR PLAN 2
Likely 2/2 saddle pulmonary embolism and RV compromise   - originally placed on NRB now weaned to RA, maintain SpO2 >92%    - management for PE as above   - Aspiration precautions    RESOLVED

## 2024-10-15 NOTE — PROGRESS NOTE ADULT - PROBLEM SELECTOR PLAN 3
- endometrial heterogenous thickening on CT Abdomen   - Transvaginal U/S today, patient on schedule  - May need future Gyn evaluation given family history and now PE - endometrial heterogenous thickening on CT Abdomen   - Transvaginal U/S - The endometrium is thickened measuring up to 2.2 cm and is heterogeneous in echotexture with multiple cystic spaces. Differential considerations include endometrial hyperplasia and neoplasm. Recommend further assessment with tissue sampling  - Reached out to GYN for outpatient appointment for EMB, will schedule with patient's daughter

## 2024-10-15 NOTE — PROGRESS NOTE ADULT - PROBLEM SELECTOR PROBLEM 1
Acute saddle pulmonary embolism

## 2024-10-16 ENCOUNTER — NON-APPOINTMENT (OUTPATIENT)
Age: 84
End: 2024-10-16

## 2024-10-16 PROBLEM — Z00.00 ENCOUNTER FOR PREVENTIVE HEALTH EXAMINATION: Status: ACTIVE | Noted: 2024-10-16

## 2024-10-19 RX ORDER — APIXABAN 5 MG/1
1 TABLET, FILM COATED ORAL
Qty: 30 | Refills: 0
Start: 2024-10-19

## 2024-10-21 PROBLEM — E78.5 HYPERLIPIDEMIA, UNSPECIFIED: Chronic | Status: ACTIVE | Noted: 2024-10-10

## 2024-10-21 PROBLEM — F03.90 UNSPECIFIED DEMENTIA, UNSPECIFIED SEVERITY, WITHOUT BEHAVIORAL DISTURBANCE, PSYCHOTIC DISTURBANCE, MOOD DISTURBANCE, AND ANXIETY: Chronic | Status: ACTIVE | Noted: 2024-10-10

## 2024-10-21 PROBLEM — I35.0 NONRHEUMATIC AORTIC (VALVE) STENOSIS: Chronic | Status: ACTIVE | Noted: 2024-10-10

## 2024-10-21 PROBLEM — I10 ESSENTIAL (PRIMARY) HYPERTENSION: Chronic | Status: ACTIVE | Noted: 2024-10-10

## 2024-10-21 PROBLEM — I25.10 ATHEROSCLEROTIC HEART DISEASE OF NATIVE CORONARY ARTERY WITHOUT ANGINA PECTORIS: Chronic | Status: ACTIVE | Noted: 2024-10-10

## 2024-10-21 PROBLEM — K21.9 GASTRO-ESOPHAGEAL REFLUX DISEASE WITHOUT ESOPHAGITIS: Chronic | Status: ACTIVE | Noted: 2024-10-10

## 2024-10-28 ENCOUNTER — NON-APPOINTMENT (OUTPATIENT)
Age: 84
End: 2024-10-28

## 2024-11-07 ENCOUNTER — EMERGENCY (EMERGENCY)
Facility: HOSPITAL | Age: 84
LOS: 1 days | Discharge: HOME | End: 2024-11-07
Attending: STUDENT IN AN ORGANIZED HEALTH CARE EDUCATION/TRAINING PROGRAM | Admitting: STUDENT IN AN ORGANIZED HEALTH CARE EDUCATION/TRAINING PROGRAM
Payer: MEDICARE

## 2024-11-07 VITALS
HEIGHT: 62 IN | RESPIRATION RATE: 18 BRPM | TEMPERATURE: 98 F | OXYGEN SATURATION: 95 % | WEIGHT: 147.93 LBS | HEART RATE: 74 BPM | DIASTOLIC BLOOD PRESSURE: 61 MMHG | SYSTOLIC BLOOD PRESSURE: 118 MMHG

## 2024-11-07 VITALS
DIASTOLIC BLOOD PRESSURE: 71 MMHG | HEART RATE: 71 BPM | OXYGEN SATURATION: 99 % | RESPIRATION RATE: 19 BRPM | TEMPERATURE: 98 F | SYSTOLIC BLOOD PRESSURE: 119 MMHG

## 2024-11-07 LAB
ALBUMIN SERPL ELPH-MCNC: 3 G/DL — LOW (ref 3.3–5)
ALP SERPL-CCNC: 71 U/L — SIGNIFICANT CHANGE UP (ref 40–120)
ALT FLD-CCNC: 11 U/L — LOW (ref 12–78)
ANION GAP SERPL CALC-SCNC: 8 MMOL/L — SIGNIFICANT CHANGE UP (ref 5–17)
AST SERPL-CCNC: 13 U/L — LOW (ref 15–37)
BASOPHILS # BLD AUTO: 0.04 K/UL — SIGNIFICANT CHANGE UP (ref 0–0.2)
BASOPHILS NFR BLD AUTO: 0.4 % — SIGNIFICANT CHANGE UP (ref 0–2)
BILIRUB SERPL-MCNC: 0.3 MG/DL — SIGNIFICANT CHANGE UP (ref 0.2–1.2)
BUN SERPL-MCNC: 30 MG/DL — HIGH (ref 7–23)
CALCIUM SERPL-MCNC: 8.6 MG/DL — SIGNIFICANT CHANGE UP (ref 8.5–10.1)
CHLORIDE SERPL-SCNC: 111 MMOL/L — HIGH (ref 96–108)
CO2 SERPL-SCNC: 24 MMOL/L — SIGNIFICANT CHANGE UP (ref 22–31)
CREAT SERPL-MCNC: 1.8 MG/DL — HIGH (ref 0.5–1.3)
EGFR: 27 ML/MIN/1.73M2 — LOW
EOSINOPHIL # BLD AUTO: 0.21 K/UL — SIGNIFICANT CHANGE UP (ref 0–0.5)
EOSINOPHIL NFR BLD AUTO: 2.2 % — SIGNIFICANT CHANGE UP (ref 0–6)
GLUCOSE SERPL-MCNC: 99 MG/DL — SIGNIFICANT CHANGE UP (ref 70–99)
HCT VFR BLD CALC: 31.1 % — LOW (ref 34.5–45)
HGB BLD-MCNC: 9.8 G/DL — LOW (ref 11.5–15.5)
IMM GRANULOCYTES NFR BLD AUTO: 0.5 % — SIGNIFICANT CHANGE UP (ref 0–0.9)
LYMPHOCYTES # BLD AUTO: 2.41 K/UL — SIGNIFICANT CHANGE UP (ref 1–3.3)
LYMPHOCYTES # BLD AUTO: 25.4 % — SIGNIFICANT CHANGE UP (ref 13–44)
MCHC RBC-ENTMCNC: 28.4 PG — SIGNIFICANT CHANGE UP (ref 27–34)
MCHC RBC-ENTMCNC: 31.5 G/DL — LOW (ref 32–36)
MCV RBC AUTO: 90.1 FL — SIGNIFICANT CHANGE UP (ref 80–100)
MONOCYTES # BLD AUTO: 0.82 K/UL — SIGNIFICANT CHANGE UP (ref 0–0.9)
MONOCYTES NFR BLD AUTO: 8.7 % — SIGNIFICANT CHANGE UP (ref 2–14)
NEUTROPHILS # BLD AUTO: 5.94 K/UL — SIGNIFICANT CHANGE UP (ref 1.8–7.4)
NEUTROPHILS NFR BLD AUTO: 62.8 % — SIGNIFICANT CHANGE UP (ref 43–77)
NRBC # BLD: 0 /100 WBCS — SIGNIFICANT CHANGE UP (ref 0–0)
PLATELET # BLD AUTO: 243 K/UL — SIGNIFICANT CHANGE UP (ref 150–400)
POTASSIUM SERPL-MCNC: 4.5 MMOL/L — SIGNIFICANT CHANGE UP (ref 3.5–5.3)
POTASSIUM SERPL-SCNC: 4.5 MMOL/L — SIGNIFICANT CHANGE UP (ref 3.5–5.3)
PROT SERPL-MCNC: 7.5 G/DL — SIGNIFICANT CHANGE UP (ref 6–8.3)
RBC # BLD: 3.45 M/UL — LOW (ref 3.8–5.2)
RBC # FLD: 15.5 % — HIGH (ref 10.3–14.5)
SODIUM SERPL-SCNC: 143 MMOL/L — SIGNIFICANT CHANGE UP (ref 135–145)
WBC # BLD: 9.47 K/UL — SIGNIFICANT CHANGE UP (ref 3.8–10.5)
WBC # FLD AUTO: 9.47 K/UL — SIGNIFICANT CHANGE UP (ref 3.8–10.5)

## 2024-11-07 PROCEDURE — 99284 EMERGENCY DEPT VISIT MOD MDM: CPT | Mod: FS

## 2024-11-07 RX ORDER — DOXYCYCLINE HYCLATE 100 MG/1
1 TABLET, FILM COATED ORAL
Qty: 14 | Refills: 0
Start: 2024-11-07 | End: 2024-11-13

## 2024-11-07 RX ORDER — DOXYCYCLINE HYCLATE 100 MG/1
100 TABLET, FILM COATED ORAL ONCE
Refills: 0 | Status: COMPLETED | OUTPATIENT
Start: 2024-11-07 | End: 2024-11-07

## 2024-11-07 RX ADMIN — DOXYCYCLINE HYCLATE 100 MILLIGRAM(S): 100 TABLET, FILM COATED ORAL at 18:10

## 2024-11-07 NOTE — ED ADULT TRIAGE NOTE - CHIEF COMPLAINT QUOTE
Pt was sent by PCP for abscess on L side of face. abscess is draining. went to outpt CT scan by PCP . Pt resides at [facility] facility. Daughter is with pt. c/o nausea. Denies fevers, chills, sweats, ABD pain.

## 2024-11-07 NOTE — ED ADULT TRIAGE NOTE - IDEAL BODY WEIGHT(KG)
Bariatric Surgery Post Op Follow up Evaluation:    HPI:   Austin Engel is seen in the surgery clinic as a postop visit after Michelle en Y Gastric Bypass on 3/6/2023 performed by Dr. AMBROSIO Dumont.     The patient is now 3 months out following the above surgery.    SUBJECTIVE:  Austin Engel states he is doing great. Did have an episode of food regurgitation after steamed broccoli. Denies nausea. Denies abdominal pain. Feels like he is walking much easier. No longer having to use his inhaler with activity.    Dysphagia/Regurgitiation: None   GERD/Reflux symptoms:No   Taking PPI? Yes    Smoking:  No   Alcohol consumption:  No    Daily protein intake is approximately 80 grams per day on the general diet.  He has been eating pulled pork, cod fish, cheesy hash browns casserole, carrots.  Drinking approximately 64+ ounces of liquids per day.    Difficulty with any food textures? Yes, scrambled eggs, yellow cheese, broccoli.   Dumping syndrome? No   Currently is exercising by walking and gardening. Goal to be back on his bike by August.   Patient reports urine output is Light Yellow.   Bowel movements every other day without liquid stools.  Stopped miralax as stools he was too soft.  Vitamin and mineral supplementations are tolerated.   Taking Ursodiol?: Yes     Bariatric Specific Diagnoses:   Hypertension on 4 medications  Hypercholesterolemia  Hyperlipidemia  Asthma  GERD  Prediabetes, A1C 6.0  Limitations on physical activity due to weight    OBJECTIVE:   Visit Vitals  /78 (BP Location: LUE - Left upper extremity, Patient Position: Sitting, Cuff Size: Large Adult)   Pulse 60   Ht 5' 9\" (1.753 m)   Wt (!) 137.6 kg (303 lb 6.4 oz)   BMI 44.80 kg/m²     PHYSICAL EXAM:  General: Pleasant, 52 year old year old . No acute distress.  Alert and Oriented x 3    LABS:   Malabsorption Panel not Obtained    Assessment/Plan:  1. Austin Engel is a 52 year old male doing well S/P Robotic Michelle en Y Gastric Bypass  2. GJ  stenosis with food regurgitation, now resolved    He has lost a total of 56.2 lbs and 8.2 BMI points since surgery and 98.3 lbs since starting the program.    GJ stenosis  Now resolved, no further issues since EGD  S/p EGD on 3/30/2023 with finding of edema at the GJ, no ulcer.  Repeat EGD is not needed at this time    Dietary/Nutrition:  Patient was encouraged to continue a food journal with routine calorie counts.    Physical Activity:  We discussed the importance of developing and maintaining an exercise plan.     Behavioral Interventions:    Psychologist post surgery is scheduled for 7/13/ 2023  Encouraged to attend monthly support group.    Post Bariatric Surgery Labs:  Next malabsorption labs to be obtained at 6 Months following surgery.    Return for 6 month post op eval with NP.    30 minutes was spent with the patient in counseling on diet and weight loss progress following surgery, establishing weight loss goals and reviewing malabsorption lab work.    I spent a total of 30 minutes on the day of the visit.  This includes pre-charting, chart review and documenting.      Patient care performed in collaboration with Dr. Sawyer Dumont.   50

## 2024-11-07 NOTE — ED PROVIDER NOTE - ATTENDING APP SHARED VISIT CONTRIBUTION OF CARE
Kian ATTG See MDM I performed a history and physical exam of the patient and discussed their management with the Physician assistant reviewed the PAs note and agree with the documented findings and plan of care. My medical decision making and observations are found above.

## 2024-11-07 NOTE — ED PROVIDER NOTE - PHYSICAL EXAMINATION
Gen: Well appearing in NAD.   Eyes: PERRLA  ENT: pharynx unremarkable.  4cm x3cm indurated area on the left cheek With a fluctuant center that is draining purulent fluid.  Minimal tenderness to palpation.   Head: atraumatic  Heart: s1/s2, RRR  Lung: CTA b/l  Abd: soft, NT/ND,  Neuro: AAO x3,   Skin: see above   Psych: Calm and cooperative

## 2024-11-07 NOTE — ED PROVIDER NOTE - NSICDXFAMILYHX_GEN_ALL_CORE_FT
WDL FAMILY HISTORY:  Father  Still living? No  Family history of heart attack, Age at diagnosis: 41-50    Mother  Still living? No  Family history of uterine cancer, Age at diagnosis: 41-50

## 2024-11-07 NOTE — ED PROVIDER NOTE - NSFOLLOWUPINSTRUCTIONS_ED_ALL_ED_FT
Follow up with your primary care physician within 2-3 days. Take the copy of your test results you were given in the emergency room for your doctor to review.     Please fill the prescription for the antibiotics and take as directed.  Please finish the entire course of medication as prescribed.  If you have any belly pain after the antibiotics, yogurt has been shown to help with this.  Do not use any alcohol or grapefruit juice with any antibiotics.    Apply warm compresses to the area for 10 minutes at least 3-4 times a day    Stay hydrated    Return to the ER if your symptoms worsen or for any other medical emergencies  ***************    Abscess    An abscess is an infected area that contains a collection of pus and debris. It can occur in almost any part of the body and occurs when the tissue gets infection. Symptoms include a painful mass that is red, warm, tender that might break open and HAVE drainage. If your health care provider gave you antibiotics make sure to take the full course and do not stop even if feeling better.     SEEK IMMEDIATE MEDICAL CARE IF YOU HAVE ANY OF THE FOLLOWING SYMPTOMS: chills, fever, muscle aches, or red streaking from the area.

## 2024-11-07 NOTE — ED PROVIDER NOTE - PROGRESS NOTE DETAILS
JUMANA Miles: Labs reviewed, no acute findings.  Abscess is already draining, will send a prescription of antibiotics, strict return precautions given.  Patient and daughter  instructed to apply warm compresses to the area

## 2024-11-07 NOTE — ED PROVIDER NOTE - PATIENT PORTAL LINK FT
You can access the FollowMyHealth Patient Portal offered by [facility] by registering at the following website: [email or web address]. By joining [employer]’s FollowMyHealth portal, you will also be able to view your health information using other applications (apps) compatible with our system.

## 2024-11-07 NOTE — ED ADULT NURSE NOTE - OBJECTIVE STATEMENT
Pt. received alert and oriented x4 with chief complaint of abscess to left side of face starting one week ago. Pt. senti n by PMD for further evaluation. Pt.. denies any body aches or chills.

## 2024-11-07 NOTE — ED ADULT NURSE NOTE - NSFALLUNIVINTERV_ED_ALL_ED
Bed/Stretcher in lowest position, wheels locked, appropriate side rails in place/Call bell, personal items and telephone in reach/Instruct patient to call for assistance before getting out of bed/chair/stretcher/Non-slip footwear applied when patient is off stretcher/[place] to call system/Physically safe environment - no spills, clutter or unnecessary equipment/Purposeful proactive rounding/Room/bathroom lighting operational, light cord in reach

## 2024-11-07 NOTE — ED ADULT NURSE NOTE - CHIEF COMPLAINT QUOTE
Pt was sent by PCP for abscess on L side of face. abscess is draining. went to outpt CT scan by PCP . Pt resides at [facility] facility. Daughter is with pt. c/o nausea. Denies fevers, chills, sweats, ABD pain.

## 2024-11-07 NOTE — ED PROVIDER NOTE - NSICDXPASTMEDICALHX_GEN_ALL_CORE_FT
PAST MEDICAL HISTORY:  Aortic stenosis     CAD (coronary artery disease)     Dementia     GERD (gastroesophageal reflux disease)     Hyperlipidemia     Hypertension     Urinary incontinence

## 2024-11-07 NOTE — ED PROVIDER NOTE - OBJECTIVE STATEMENT
83 y/o F with PMH of dementia, HTN, HLD, CKD3, CAD s/p PCI to RCA with ADDISON (11/2019) on ASA, severe AS, mild LVH, grade II LV diastolic dysfunction, mild-mod MR, mod TR, mild pulm HTN, recently admitted at [facility] 10/10-10/15 for saddle PE started on Eliquis Presents to the ED today with left cheek abscess x 1.5 weeks.  Patient saw her primary care doctor and was sent for outpatient maxillofacial CAT scan which she had 2 days ago which showed an irregular density in the left buccal area, it started to drain pus yesterday.  She denies any fevers, difficulty eating or drinking, severe pain, headache, dizziness or all other complaints

## 2024-11-12 ENCOUNTER — APPOINTMENT (OUTPATIENT)
Dept: NEUROLOGY | Facility: CLINIC | Age: 84
End: 2024-11-12
Payer: MEDICARE

## 2024-11-12 ENCOUNTER — NON-APPOINTMENT (OUTPATIENT)
Age: 84
End: 2024-11-12

## 2024-11-12 VITALS
TEMPERATURE: 98.2 F | DIASTOLIC BLOOD PRESSURE: 83 MMHG | HEIGHT: 58 IN | BODY MASS INDEX: 31.07 KG/M2 | HEART RATE: 72 BPM | WEIGHT: 148 LBS | SYSTOLIC BLOOD PRESSURE: 137 MMHG

## 2024-11-12 DIAGNOSIS — E78.5 HYPERLIPIDEMIA, UNSPECIFIED: ICD-10-CM

## 2024-11-12 DIAGNOSIS — N18.31 CHRONIC KIDNEY DISEASE, STAGE 3A: ICD-10-CM

## 2024-11-12 DIAGNOSIS — Z78.9 OTHER SPECIFIED HEALTH STATUS: ICD-10-CM

## 2024-11-12 DIAGNOSIS — Z86.79 PERSONAL HISTORY OF OTHER DISEASES OF THE CIRCULATORY SYSTEM: ICD-10-CM

## 2024-11-12 DIAGNOSIS — I10 ESSENTIAL (PRIMARY) HYPERTENSION: ICD-10-CM

## 2024-11-12 DIAGNOSIS — Z80.9 FAMILY HISTORY OF MALIGNANT NEOPLASM, UNSPECIFIED: ICD-10-CM

## 2024-11-12 DIAGNOSIS — N18.9 CHRONIC KIDNEY DISEASE, UNSPECIFIED: ICD-10-CM

## 2024-11-12 DIAGNOSIS — Z95.5 PRESENCE OF CORONARY ANGIOPLASTY IMPLANT AND GRAFT: ICD-10-CM

## 2024-11-12 DIAGNOSIS — F03.90 UNSPECIFIED DEMENTIA W/OUT BEHAVIORAL DISTURBANCE: ICD-10-CM

## 2024-11-12 DIAGNOSIS — I26.92 SADDLE EMBOLUS OF PULMONARY ARTERY W/OUT ACUTE COR PULMONALE: ICD-10-CM

## 2024-11-12 PROCEDURE — 99204 OFFICE O/P NEW MOD 45 MIN: CPT

## 2024-11-12 RX ORDER — DONEPEZIL HYDROCHLORIDE 5 MG/1
5 TABLET ORAL
Refills: 0 | Status: ACTIVE | COMMUNITY

## 2024-11-12 RX ORDER — DOXYCYCLINE HYCLATE 100 MG/1
100 CAPSULE ORAL
Refills: 0 | Status: ACTIVE | COMMUNITY

## 2024-11-12 RX ORDER — METOPROLOL TARTRATE 50 MG/1
50 TABLET ORAL
Refills: 0 | Status: ACTIVE | COMMUNITY

## 2024-11-12 RX ORDER — ATORVASTATIN CALCIUM 40 MG/1
40 TABLET, FILM COATED ORAL
Refills: 0 | Status: ACTIVE | COMMUNITY

## 2024-11-12 RX ORDER — APIXABAN 5 MG/1
5 TABLET, FILM COATED ORAL
Refills: 0 | Status: ACTIVE | COMMUNITY

## 2024-11-12 RX ORDER — DONEPEZIL HYDROCHLORIDE 10 MG/1
10 TABLET ORAL DAILY
Qty: 90 | Refills: 1 | Status: ACTIVE | COMMUNITY
Start: 2024-11-12 | End: 1900-01-01

## 2024-11-20 PROCEDURE — 83690 ASSAY OF LIPASE: CPT

## 2024-11-20 PROCEDURE — 84484 ASSAY OF TROPONIN QUANT: CPT

## 2024-11-20 PROCEDURE — 87040 BLOOD CULTURE FOR BACTERIA: CPT

## 2024-11-20 PROCEDURE — 83735 ASSAY OF MAGNESIUM: CPT

## 2024-11-20 PROCEDURE — 99291 CRITICAL CARE FIRST HOUR: CPT

## 2024-11-20 PROCEDURE — 86850 RBC ANTIBODY SCREEN: CPT

## 2024-11-20 PROCEDURE — 96375 TX/PRO/DX INJ NEW DRUG ADDON: CPT

## 2024-11-20 PROCEDURE — 96374 THER/PROPH/DIAG INJ IV PUSH: CPT

## 2024-11-20 PROCEDURE — 74177 CT ABD & PELVIS W/CONTRAST: CPT | Mod: MC

## 2024-11-20 PROCEDURE — 70450 CT HEAD/BRAIN W/O DYE: CPT | Mod: MC

## 2024-11-20 PROCEDURE — 80053 COMPREHEN METABOLIC PANEL: CPT

## 2024-11-20 PROCEDURE — 86900 BLOOD TYPING SEROLOGIC ABO: CPT

## 2024-11-20 PROCEDURE — 99284 EMERGENCY DEPT VISIT MOD MDM: CPT | Mod: 25

## 2024-11-20 PROCEDURE — 82962 GLUCOSE BLOOD TEST: CPT

## 2024-11-20 PROCEDURE — 83605 ASSAY OF LACTIC ACID: CPT

## 2024-11-20 PROCEDURE — 36415 COLL VENOUS BLD VENIPUNCTURE: CPT

## 2024-11-20 PROCEDURE — 85610 PROTHROMBIN TIME: CPT

## 2024-11-20 PROCEDURE — 93005 ELECTROCARDIOGRAM TRACING: CPT

## 2024-11-20 PROCEDURE — 84443 ASSAY THYROID STIM HORMONE: CPT

## 2024-11-20 PROCEDURE — 85730 THROMBOPLASTIN TIME PARTIAL: CPT

## 2024-11-20 PROCEDURE — 71275 CT ANGIOGRAPHY CHEST: CPT | Mod: MC

## 2024-11-20 PROCEDURE — 85025 COMPLETE CBC W/AUTO DIFF WBC: CPT

## 2024-11-20 PROCEDURE — 83880 ASSAY OF NATRIURETIC PEPTIDE: CPT

## 2024-11-20 PROCEDURE — 96374 THER/PROPH/DIAG INJ IV PUSH: CPT | Mod: XU

## 2024-11-20 PROCEDURE — 86901 BLOOD TYPING SEROLOGIC RH(D): CPT

## 2024-11-20 PROCEDURE — 71045 X-RAY EXAM CHEST 1 VIEW: CPT

## 2024-11-26 PROCEDURE — 85014 HEMATOCRIT: CPT

## 2024-11-26 PROCEDURE — 99285 EMERGENCY DEPT VISIT HI MDM: CPT

## 2024-11-26 PROCEDURE — 86147 CARDIOLIPIN ANTIBODY EA IG: CPT

## 2024-11-26 PROCEDURE — 82947 ASSAY GLUCOSE BLOOD QUANT: CPT

## 2024-11-26 PROCEDURE — 36415 COLL VENOUS BLD VENIPUNCTURE: CPT

## 2024-11-26 PROCEDURE — 86901 BLOOD TYPING SEROLOGIC RH(D): CPT

## 2024-11-26 PROCEDURE — 85732 THROMBOPLASTIN TIME PARTIAL: CPT

## 2024-11-26 PROCEDURE — 84100 ASSAY OF PHOSPHORUS: CPT

## 2024-11-26 PROCEDURE — 85027 COMPLETE CBC AUTOMATED: CPT

## 2024-11-26 PROCEDURE — 85240 CLOT FACTOR VIII AHG 1 STAGE: CPT

## 2024-11-26 PROCEDURE — 97530 THERAPEUTIC ACTIVITIES: CPT

## 2024-11-26 PROCEDURE — 83036 HEMOGLOBIN GLYCOSYLATED A1C: CPT

## 2024-11-26 PROCEDURE — 93306 TTE W/DOPPLER COMPLETE: CPT

## 2024-11-26 PROCEDURE — 93970 EXTREMITY STUDY: CPT

## 2024-11-26 PROCEDURE — 85610 PROTHROMBIN TIME: CPT

## 2024-11-26 PROCEDURE — 85384 FIBRINOGEN ACTIVITY: CPT

## 2024-11-26 PROCEDURE — 85379 FIBRIN DEGRADATION QUANT: CPT

## 2024-11-26 PROCEDURE — 97162 PT EVAL MOD COMPLEX 30 MIN: CPT

## 2024-11-26 PROCEDURE — 86850 RBC ANTIBODY SCREEN: CPT

## 2024-11-26 PROCEDURE — 85220 BLOOC CLOT FACTOR V TEST: CPT

## 2024-11-26 PROCEDURE — 84145 PROCALCITONIN (PCT): CPT

## 2024-11-26 PROCEDURE — 86900 BLOOD TYPING SEROLOGIC ABO: CPT

## 2024-11-26 PROCEDURE — 82330 ASSAY OF CALCIUM: CPT

## 2024-11-26 PROCEDURE — 82803 BLOOD GASES ANY COMBINATION: CPT

## 2024-11-26 PROCEDURE — 82435 ASSAY OF BLOOD CHLORIDE: CPT

## 2024-11-26 PROCEDURE — 85730 THROMBOPLASTIN TIME PARTIAL: CPT

## 2024-11-26 PROCEDURE — 83735 ASSAY OF MAGNESIUM: CPT

## 2024-11-26 PROCEDURE — 97166 OT EVAL MOD COMPLEX 45 MIN: CPT

## 2024-11-26 PROCEDURE — 86146 BETA-2 GLYCOPROTEIN ANTIBODY: CPT

## 2024-11-26 PROCEDURE — 85025 COMPLETE CBC W/AUTO DIFF WBC: CPT

## 2024-11-26 PROCEDURE — 80053 COMPREHEN METABOLIC PANEL: CPT

## 2024-11-26 PROCEDURE — 97116 GAIT TRAINING THERAPY: CPT

## 2024-11-26 PROCEDURE — 85613 RUSSELL VIPER VENOM DILUTED: CPT

## 2024-11-26 PROCEDURE — 0241U: CPT

## 2024-11-26 PROCEDURE — 83605 ASSAY OF LACTIC ACID: CPT

## 2024-11-26 PROCEDURE — 83090 ASSAY OF HOMOCYSTEINE: CPT

## 2024-11-26 PROCEDURE — 83880 ASSAY OF NATRIURETIC PEPTIDE: CPT

## 2024-11-26 PROCEDURE — 80048 BASIC METABOLIC PNL TOTAL CA: CPT

## 2024-11-26 PROCEDURE — 84443 ASSAY THYROID STIM HORMONE: CPT

## 2024-11-26 PROCEDURE — 85018 HEMOGLOBIN: CPT

## 2024-11-26 PROCEDURE — 76830 TRANSVAGINAL US NON-OB: CPT

## 2024-11-26 PROCEDURE — 87637 SARSCOV2&INF A&B&RSV AMP PRB: CPT

## 2024-11-26 PROCEDURE — 84484 ASSAY OF TROPONIN QUANT: CPT

## 2024-11-26 PROCEDURE — 84295 ASSAY OF SERUM SODIUM: CPT

## 2024-11-26 PROCEDURE — 84132 ASSAY OF SERUM POTASSIUM: CPT

## 2025-03-24 ENCOUNTER — APPOINTMENT (OUTPATIENT)
Dept: NEUROLOGY | Facility: CLINIC | Age: 85
End: 2025-03-24

## 2025-04-11 NOTE — DISCHARGE NOTE PROVIDER - NSDCADMDATE_GEN_ALL_CORE_FT
CASE MANAGEMENT DISCHARGE SUMMARY      Discharge to: home. Spoke with patient. Stated feeling well is anticipating d/c today. Denied any DCP needs.       IMM given: 4/11/25  Follow-Up copy of Important Message from Medicare (IMM2) has been explained to patient and/or designated healthcare decision maker (HCDM). Pt and/or HCDM aware that patient is permitted to stay an additional 4 hours prior to discharge to consider an appeal if they feel as though they are being discharged too soon. Patient may discharge as planned if chooses to do so.  Patient/HCDM voice no other concerns or questions regarding this process.       New Durable Medical Equipment ordered/agency: none    Transportation: pvt       Confirmed discharge plan with:     Patient: yes     Family:  yes per patient.        RN, name: Jesus             07-Nov-2019 19:39

## 2025-04-23 NOTE — H&P ADULT - NSHPPOAPULMEMBOLUS_GEN_A_CORE
Quality 358: Patient-Centered Surgical Risk Assessment And Communication: Documentation of patient-specific risk assessment with a risk calculator based on multi-institutional clinical data, the specific risk calculator used, and communication of risk assessment from risk calculator with the patient or family. Quality 47: Advance Care Plan: Advance Care Planning discussed and documented; advance care plan or surrogate decision maker documented in the medical record. Detail Level: Detailed Quality 431: Preventive Care And Screening: Unhealthy Alcohol Use - Screening: Patient not identified as an unhealthy alcohol user when screened for unhealthy alcohol use using a systematic screening method Quality 226: Preventive Care And Screening: Tobacco Use: Screening And Cessation Intervention: Patient screened for tobacco use and is an ex/non-smoker Quality 130: Documentation Of Current Medications In The Medical Record: Current Medications Documented yes

## 2025-04-25 ENCOUNTER — EMERGENCY (EMERGENCY)
Facility: HOSPITAL | Age: 85
LOS: 1 days | End: 2025-04-25
Attending: STUDENT IN AN ORGANIZED HEALTH CARE EDUCATION/TRAINING PROGRAM | Admitting: STUDENT IN AN ORGANIZED HEALTH CARE EDUCATION/TRAINING PROGRAM
Payer: MEDICARE

## 2025-04-25 ENCOUNTER — INPATIENT (INPATIENT)
Facility: HOSPITAL | Age: 85
LOS: 4 days | Discharge: HOME HEALTH | End: 2025-04-30
Attending: STUDENT IN AN ORGANIZED HEALTH CARE EDUCATION/TRAINING PROGRAM | Admitting: STUDENT IN AN ORGANIZED HEALTH CARE EDUCATION/TRAINING PROGRAM
Payer: MEDICARE

## 2025-04-25 VITALS
DIASTOLIC BLOOD PRESSURE: 63 MMHG | WEIGHT: 186.07 LBS | HEART RATE: 66 BPM | SYSTOLIC BLOOD PRESSURE: 147 MMHG | OXYGEN SATURATION: 97 % | HEIGHT: 65 IN | TEMPERATURE: 99 F | RESPIRATION RATE: 18 BRPM

## 2025-04-25 VITALS
TEMPERATURE: 98 F | HEART RATE: 74 BPM | SYSTOLIC BLOOD PRESSURE: 140 MMHG | OXYGEN SATURATION: 96 % | WEIGHT: 139.99 LBS | DIASTOLIC BLOOD PRESSURE: 60 MMHG | HEIGHT: 62 IN | RESPIRATION RATE: 16 BRPM

## 2025-04-25 VITALS
DIASTOLIC BLOOD PRESSURE: 85 MMHG | SYSTOLIC BLOOD PRESSURE: 157 MMHG | HEART RATE: 66 BPM | TEMPERATURE: 98 F | RESPIRATION RATE: 18 BRPM | OXYGEN SATURATION: 98 %

## 2025-04-25 LAB
ALBUMIN SERPL ELPH-MCNC: 3.2 G/DL — LOW (ref 3.3–5)
ALP SERPL-CCNC: 80 U/L — SIGNIFICANT CHANGE UP (ref 40–120)
ALT FLD-CCNC: 14 U/L — SIGNIFICANT CHANGE UP (ref 12–78)
ANION GAP SERPL CALC-SCNC: 5 MMOL/L — SIGNIFICANT CHANGE UP (ref 5–17)
AST SERPL-CCNC: 12 U/L — LOW (ref 15–37)
BASOPHILS # BLD AUTO: 0.03 K/UL — SIGNIFICANT CHANGE UP (ref 0–0.2)
BASOPHILS NFR BLD AUTO: 0.3 % — SIGNIFICANT CHANGE UP (ref 0–2)
BILIRUB SERPL-MCNC: 0.4 MG/DL — SIGNIFICANT CHANGE UP (ref 0.2–1.2)
BUN SERPL-MCNC: 34 MG/DL — HIGH (ref 7–23)
CALCIUM SERPL-MCNC: 9.2 MG/DL — SIGNIFICANT CHANGE UP (ref 8.5–10.1)
CHLORIDE SERPL-SCNC: 107 MMOL/L — SIGNIFICANT CHANGE UP (ref 96–108)
CO2 SERPL-SCNC: 28 MMOL/L — SIGNIFICANT CHANGE UP (ref 22–31)
CREAT SERPL-MCNC: 1.5 MG/DL — HIGH (ref 0.5–1.3)
EGFR: 34 ML/MIN/1.73M2 — LOW
EGFR: 34 ML/MIN/1.73M2 — LOW
EOSINOPHIL # BLD AUTO: 0.24 K/UL — SIGNIFICANT CHANGE UP (ref 0–0.5)
EOSINOPHIL NFR BLD AUTO: 2.6 % — SIGNIFICANT CHANGE UP (ref 0–6)
GLUCOSE SERPL-MCNC: 88 MG/DL — SIGNIFICANT CHANGE UP (ref 70–99)
HCT VFR BLD CALC: 32.1 % — LOW (ref 34.5–45)
HGB BLD-MCNC: 10.2 G/DL — LOW (ref 11.5–15.5)
IMM GRANULOCYTES NFR BLD AUTO: 0.4 % — SIGNIFICANT CHANGE UP (ref 0–0.9)
LYMPHOCYTES # BLD AUTO: 2.37 K/UL — SIGNIFICANT CHANGE UP (ref 1–3.3)
LYMPHOCYTES # BLD AUTO: 25.6 % — SIGNIFICANT CHANGE UP (ref 13–44)
MCHC RBC-ENTMCNC: 28 PG — SIGNIFICANT CHANGE UP (ref 27–34)
MCHC RBC-ENTMCNC: 31.8 G/DL — LOW (ref 32–36)
MCV RBC AUTO: 88.2 FL — SIGNIFICANT CHANGE UP (ref 80–100)
MONOCYTES # BLD AUTO: 0.77 K/UL — SIGNIFICANT CHANGE UP (ref 0–0.9)
MONOCYTES NFR BLD AUTO: 8.3 % — SIGNIFICANT CHANGE UP (ref 2–14)
NEUTROPHILS # BLD AUTO: 5.82 K/UL — SIGNIFICANT CHANGE UP (ref 1.8–7.4)
NEUTROPHILS NFR BLD AUTO: 62.8 % — SIGNIFICANT CHANGE UP (ref 43–77)
NRBC BLD AUTO-RTO: 0 /100 WBCS — SIGNIFICANT CHANGE UP (ref 0–0)
PLATELET # BLD AUTO: 194 K/UL — SIGNIFICANT CHANGE UP (ref 150–400)
POTASSIUM SERPL-MCNC: 4.2 MMOL/L — SIGNIFICANT CHANGE UP (ref 3.5–5.3)
POTASSIUM SERPL-SCNC: 4.2 MMOL/L — SIGNIFICANT CHANGE UP (ref 3.5–5.3)
PROT SERPL-MCNC: 7.6 G/DL — SIGNIFICANT CHANGE UP (ref 6–8.3)
RBC # BLD: 3.64 M/UL — LOW (ref 3.8–5.2)
RBC # FLD: 14.6 % — HIGH (ref 10.3–14.5)
SODIUM SERPL-SCNC: 140 MMOL/L — SIGNIFICANT CHANGE UP (ref 135–145)
WBC # BLD: 9.27 K/UL — SIGNIFICANT CHANGE UP (ref 3.8–10.5)
WBC # FLD AUTO: 9.27 K/UL — SIGNIFICANT CHANGE UP (ref 3.8–10.5)

## 2025-04-25 PROCEDURE — 85025 COMPLETE CBC W/AUTO DIFF WBC: CPT

## 2025-04-25 PROCEDURE — 80053 COMPREHEN METABOLIC PANEL: CPT

## 2025-04-25 PROCEDURE — 36415 COLL VENOUS BLD VENIPUNCTURE: CPT

## 2025-04-25 PROCEDURE — 99285 EMERGENCY DEPT VISIT HI MDM: CPT

## 2025-04-25 PROCEDURE — 70487 CT MAXILLOFACIAL W/DYE: CPT | Mod: 26

## 2025-04-25 PROCEDURE — 96374 THER/PROPH/DIAG INJ IV PUSH: CPT | Mod: XU

## 2025-04-25 PROCEDURE — 99285 EMERGENCY DEPT VISIT HI MDM: CPT | Mod: 25

## 2025-04-25 PROCEDURE — 70487 CT MAXILLOFACIAL W/DYE: CPT | Mod: MC

## 2025-04-25 RX ORDER — AMPICILLIN SODIUM AND SULBACTAM SODIUM 1; .5 G/1; G/1
3 INJECTION, POWDER, FOR SOLUTION INTRAMUSCULAR; INTRAVENOUS ONCE
Refills: 0 | Status: COMPLETED | OUTPATIENT
Start: 2025-04-25 | End: 2025-04-25

## 2025-04-25 RX ADMIN — AMPICILLIN SODIUM AND SULBACTAM SODIUM 200 GRAM(S): 1; .5 INJECTION, POWDER, FOR SOLUTION INTRAMUSCULAR; INTRAVENOUS at 13:50

## 2025-04-25 NOTE — ED PROVIDER NOTE - CLINICAL SUMMARY MEDICAL DECISION MAKING FREE TEXT BOX
86 y/o F with PMH of dementia, HTN, HLD, CKD3, CAD s/p PCI to RCA with ADDISON (11/2019) on ASA, severe AS, mild LVH, grade II LV diastolic dysfunction, mild-mod MR, mod TR, mild pulm HTN, recently admitted at [facility] 10/10-10/15 for saddle PE started on Eliquis Presents to the ED today with left cheek abscess pt states she was hit by h er grandson soccer ball 4 years ago, sent from [facility] living for drainage from face. pt denies fevers    will check labs, CT, abx for possible abscess, cellulitis

## 2025-04-25 NOTE — ED PROVIDER NOTE - ATTENDING CONTRIBUTION TO CARE
I, Noel Colunga DO,  performed the initial face to face bedside interview with this patient regarding history of present illness, review of symptoms and relevant past medical, social and family history.  I completed an independent physical examination.  I was the initial provider who evaluated this patient. I have signed out the follow up of any pending tests (i.e. labs, radiological studies) to the resident.  I have communicated the patient’s plan of care and disposition with the resident.  The history, relevant review of systems, past medical and surgical history, medical decision making, and physical examination was documented by the scribe in my presence and I attest to the accuracy of the documentation.

## 2025-04-25 NOTE — ED ADULT NURSE NOTE - NSFALLHARMRISKINTERV_ED_ALL_ED

## 2025-04-25 NOTE — ED ADULT TRIAGE NOTE - CHIEF COMPLAINT QUOTE
c/o left  sided facial swelling and pain, note dot have a wound to the left cheek reports result of being kicked by a grandson 2 weeks ago.  Sent in by [facility] for [facility] eval, pt resides at [facility] assisted living [facility]. Phx of CAD, Aortic stenosis, Dementia, HTN. See pt's chart for accompanying paperwork form [place].

## 2025-04-25 NOTE — ED ADULT TRIAGE NOTE - BP NONINVASIVE SYSTOLIC (MM HG)
140
FREE:[LAST:[Esequiel],FIRST:[Aleksandr],PHONE:[phone]],FAX:[(   )    -],ADDRESS:[Internal Medicine  [address] [place] [address]]]

## 2025-04-25 NOTE — ED PROVIDER NOTE - OBJECTIVE STATEMENT
86 y/o F with PMH of dementia, HTN, HLD, CKD3, CAD s/p PCI to RCA with ADDISON (11/2019) on ASA, severe AS, mild LVH, grade II LV diastolic dysfunction, mild-mod MR, mod TR, mild pulm HTN, recently admitted at [facility] 10/10-10/15 for saddle PE started on Eliquis Presents to the ED today with left cheek abscess pt states she was hit by h er grandson soccer ball 4 years ago, sent from [facility] assisted living for drainage from face. pt denies fevers

## 2025-04-25 NOTE — ED PROVIDER NOTE - PROGRESS NOTE DETAILS
Milan Rodriguez MD PGY-4  Patient now status post/removal of very poorly maintained dentures.  Dentures were black appearing apparently not removed or cleaned nightly.   - C/W Unasyn & Vanc  - OMFS now s/p extensive bedside proceduralization of the abscess / washout w/ topical vanc  - Recommend at least overnight stay & will round on her in AM, but pt TBA to medicine

## 2025-04-25 NOTE — ED ADULT NURSE NOTE - OBJECTIVE STATEMENT
Pt received in bed alert an oriented and resting in bed with the abscess to the left side of face( cheek) which as per pt appeared in the last few days. AS per Md's orders orders IV ksenia placed blood specimen obtained and sent to the lab. Pt stable and nursing care ongoing an safety maintained. Pt received in bed alert an oriented and resting in bed with the abscess to the left side of face( cheek) which as per pt appeared in the last few days. AS per Md's orders  IV ksenia placed blood specimen obtained and sent to the lab. Pt stable and nursing care ongoing an safety maintained.

## 2025-04-25 NOTE — ED PROVIDER NOTE - PROGRESS NOTE DETAILS
I, Dr. Jimenez, received this patient in sign out at 1445 from Dr. Stout pending CT fistula vs abscess on CT. already on unasyn. called transfer center for OMFS consult, will have call back. spoke with OMFS, will call back with recommendations patient accepted to [facility] for eval with OMFS, Dr. Alvarez

## 2025-04-25 NOTE — ED ADULT NURSE NOTE - NSFALLUNIVINTERV_ED_ALL_ED
Bed/Stretcher in lowest position, wheels locked, appropriate side rails in place/Call bell, personal items and telephone in reach/Instruct patient to call for assistance before getting out of bed/chair/stretcher/Non-slip footwear applied when patient is off stretcher/[place] to call system/Physically safe environment - no spills, clutter or unnecessary equipment/Purposeful proactive rounding/Room/bathroom lighting operational, light cord in reach

## 2025-04-25 NOTE — ED PROVIDER NOTE - PHYSICAL EXAMINATION
Gen: Well appearing in NAD  Head: NC/AT  face: swelling redness of left cheek with purulent drainage   Neck: trachea midline  Resp:  No distress  Ext: no deformities  Neuro:  A&O appears non focal  Skin:  Warm and dry as visualized  Psych:  Normal affect and mood

## 2025-04-25 NOTE — ED PROVIDER NOTE - CLINICAL SUMMARY MEDICAL DECISION MAKING FREE TEXT BOX
Patient transferred from outside hospital to see OMFS.  Patient has CT that shows abscess versus fistula left maxillary area.  She was struck by a soccer ball that her great-grandson kicked towards her 4 days ago.  It is draining.  No fevers no chills no pain.  She got Unasyn 3 g at the outside hospital transferred here.  Patient has no complaints.  Vital stable.  I spoke to OMFS they will see the patient.  Will continue Unasyn.  She got labs at the outside hospital which I reviewed which are nonactionable.  Patient will likely need to stay overnight for IV antibiotics. Patient is on Eliquis.

## 2025-04-25 NOTE — ED ADULT NURSE NOTE - CHIEF COMPLAINT QUOTE
c/o left  sided facial swelling and pain, note dot have a wound to the left cheek reports result of being kicked by a grandson 2 weeks ago.  Sent in by [facility] for [facility] eval, pt resides at [facility] assisted living [facility]. Phx of CAD, Aortic stenosis, Dementia, HTN. See pt's chart for accompanying paperwork form [place].

## 2025-04-25 NOTE — ED ADULT NURSE NOTE - OBJECTIVE STATEMENT
85 year old AO4 female coming in as a transfer from [place] for OMFS consult. Pt came in with right 20g IV to the AC, flushed for patency. Pt received a dose of abx. Unasyn prior to arrival. Left check wound and swelling noted, states it resulted from when her grandson kicked a soccer ball into the face. Able to speak in full sentences. RR even and unlabored, No accessory muscle use noted. Placed on a cardiac monitor noted to be sinus rhythm, Pt safety maintained. Pending OMFS consult.

## 2025-04-25 NOTE — ED ADULT TRIAGE NOTE - BSA (M2)
Spoke with patient about results. He conveyed understanding. He spoke with MRI department and patient reports have plates and screws in his left legs for about 15 years as noted in his Xray results. We will await answer from MRI department regarding this.   1.64

## 2025-04-26 DIAGNOSIS — N18.9 CHRONIC KIDNEY DISEASE, UNSPECIFIED: ICD-10-CM

## 2025-04-26 DIAGNOSIS — K12.2 CELLULITIS AND ABSCESS OF MOUTH: ICD-10-CM

## 2025-04-26 DIAGNOSIS — I25.10 ATHEROSCLEROTIC HEART DISEASE OF NATIVE CORONARY ARTERY WITHOUT ANGINA PECTORIS: ICD-10-CM

## 2025-04-26 DIAGNOSIS — D63.8 ANEMIA IN OTHER CHRONIC DISEASES CLASSIFIED ELSEWHERE: ICD-10-CM

## 2025-04-26 DIAGNOSIS — L02.01 CUTANEOUS ABSCESS OF FACE: ICD-10-CM

## 2025-04-26 DIAGNOSIS — F03.90 UNSPECIFIED DEMENTIA, UNSPECIFIED SEVERITY, WITHOUT BEHAVIORAL DISTURBANCE, PSYCHOTIC DISTURBANCE, MOOD DISTURBANCE, AND ANXIETY: ICD-10-CM

## 2025-04-26 DIAGNOSIS — Z86.711 PERSONAL HISTORY OF PULMONARY EMBOLISM: ICD-10-CM

## 2025-04-26 DIAGNOSIS — Z29.9 ENCOUNTER FOR PROPHYLACTIC MEASURES, UNSPECIFIED: ICD-10-CM

## 2025-04-26 LAB
ANION GAP SERPL CALC-SCNC: 14 MMOL/L — SIGNIFICANT CHANGE UP (ref 7–14)
BUN SERPL-MCNC: 26 MG/DL — HIGH (ref 7–23)
CALCIUM SERPL-MCNC: 9.2 MG/DL — SIGNIFICANT CHANGE UP (ref 8.4–10.5)
CHLORIDE SERPL-SCNC: 105 MMOL/L — SIGNIFICANT CHANGE UP (ref 98–107)
CO2 SERPL-SCNC: 21 MMOL/L — LOW (ref 22–31)
CREAT SERPL-MCNC: 1.32 MG/DL — HIGH (ref 0.5–1.3)
EGFR: 39 ML/MIN/1.73M2 — LOW
EGFR: 39 ML/MIN/1.73M2 — LOW
GLUCOSE SERPL-MCNC: 91 MG/DL — SIGNIFICANT CHANGE UP (ref 70–99)
HCT VFR BLD CALC: 31.7 % — LOW (ref 34.5–45)
HGB BLD-MCNC: 10.3 G/DL — LOW (ref 11.5–15.5)
MAGNESIUM SERPL-MCNC: 2.3 MG/DL — SIGNIFICANT CHANGE UP (ref 1.6–2.6)
MCHC RBC-ENTMCNC: 28.6 PG — SIGNIFICANT CHANGE UP (ref 27–34)
MCHC RBC-ENTMCNC: 32.5 G/DL — SIGNIFICANT CHANGE UP (ref 32–36)
MCV RBC AUTO: 88.1 FL — SIGNIFICANT CHANGE UP (ref 80–100)
MRSA PCR RESULT.: SIGNIFICANT CHANGE UP
NRBC # BLD AUTO: 0 K/UL — SIGNIFICANT CHANGE UP (ref 0–0)
NRBC # FLD: 0 K/UL — SIGNIFICANT CHANGE UP (ref 0–0)
NRBC BLD AUTO-RTO: 0 /100 WBCS — SIGNIFICANT CHANGE UP (ref 0–0)
PHOSPHATE SERPL-MCNC: 3.5 MG/DL — SIGNIFICANT CHANGE UP (ref 2.5–4.5)
PLATELET # BLD AUTO: 206 K/UL — SIGNIFICANT CHANGE UP (ref 150–400)
POTASSIUM SERPL-MCNC: 4.1 MMOL/L — SIGNIFICANT CHANGE UP (ref 3.5–5.3)
POTASSIUM SERPL-SCNC: 4.1 MMOL/L — SIGNIFICANT CHANGE UP (ref 3.5–5.3)
RBC # BLD: 3.6 M/UL — LOW (ref 3.8–5.2)
RBC # FLD: 14.4 % — SIGNIFICANT CHANGE UP (ref 10.3–14.5)
S AUREUS DNA NOSE QL NAA+PROBE: SIGNIFICANT CHANGE UP
SODIUM SERPL-SCNC: 140 MMOL/L — SIGNIFICANT CHANGE UP (ref 135–145)
WBC # BLD: 9.3 K/UL — SIGNIFICANT CHANGE UP (ref 3.8–10.5)
WBC # FLD AUTO: 9.3 K/UL — SIGNIFICANT CHANGE UP (ref 3.8–10.5)

## 2025-04-26 RX ORDER — ASPIRIN 325 MG
81 TABLET ORAL DAILY
Refills: 0 | Status: DISCONTINUED | OUTPATIENT
Start: 2025-04-26 | End: 2025-04-30

## 2025-04-26 RX ORDER — ATORVASTATIN CALCIUM 80 MG/1
1 TABLET, FILM COATED ORAL
Refills: 0 | DISCHARGE

## 2025-04-26 RX ORDER — AMPICILLIN SODIUM AND SULBACTAM SODIUM 1; .5 G/1; G/1
3 INJECTION, POWDER, FOR SOLUTION INTRAMUSCULAR; INTRAVENOUS ONCE
Refills: 0 | Status: COMPLETED | OUTPATIENT
Start: 2025-04-26 | End: 2025-04-26

## 2025-04-26 RX ORDER — VANCOMYCIN HCL IN 5 % DEXTROSE 1.5G/250ML
1000 PLASTIC BAG, INJECTION (ML) INTRAVENOUS ONCE
Refills: 0 | Status: COMPLETED | OUTPATIENT
Start: 2025-04-26 | End: 2025-04-26

## 2025-04-26 RX ORDER — AMPICILLIN SODIUM AND SULBACTAM SODIUM 1; .5 G/1; G/1
3 INJECTION, POWDER, FOR SOLUTION INTRAMUSCULAR; INTRAVENOUS EVERY 12 HOURS
Refills: 0 | Status: DISCONTINUED | OUTPATIENT
Start: 2025-04-26 | End: 2025-04-30

## 2025-04-26 RX ORDER — DONEPEZIL HYDROCHLORIDE 5 MG/1
1 TABLET ORAL
Refills: 0 | DISCHARGE

## 2025-04-26 RX ORDER — APIXABAN 2.5 MG/1
1 TABLET, FILM COATED ORAL
Refills: 0 | DISCHARGE

## 2025-04-26 RX ORDER — ASPIRIN 325 MG
1 TABLET ORAL
Refills: 0 | DISCHARGE

## 2025-04-26 RX ORDER — ATORVASTATIN CALCIUM 80 MG/1
40 TABLET, FILM COATED ORAL AT BEDTIME
Refills: 0 | Status: DISCONTINUED | OUTPATIENT
Start: 2025-04-26 | End: 2025-04-30

## 2025-04-26 RX ORDER — ASPIRIN 325 MG
81 TABLET ORAL DAILY
Refills: 0 | Status: DISCONTINUED | OUTPATIENT
Start: 2025-04-26 | End: 2025-04-26

## 2025-04-26 RX ORDER — METOPROLOL SUCCINATE 50 MG/1
1 TABLET, EXTENDED RELEASE ORAL
Refills: 0 | DISCHARGE

## 2025-04-26 RX ORDER — VANCOMYCIN HCL IN 5 % DEXTROSE 1.5G/250ML
500 PLASTIC BAG, INJECTION (ML) INTRAVENOUS ONCE
Refills: 0 | Status: COMPLETED | OUTPATIENT
Start: 2025-04-26 | End: 2025-04-26

## 2025-04-26 RX ORDER — AMPICILLIN SODIUM AND SULBACTAM SODIUM 1; .5 G/1; G/1
3 INJECTION, POWDER, FOR SOLUTION INTRAMUSCULAR; INTRAVENOUS EVERY 6 HOURS
Refills: 0 | Status: DISCONTINUED | OUTPATIENT
Start: 2025-04-26 | End: 2025-04-26

## 2025-04-26 RX ORDER — DONEPEZIL HYDROCHLORIDE 5 MG/1
10 TABLET ORAL AT BEDTIME
Refills: 0 | Status: DISCONTINUED | OUTPATIENT
Start: 2025-04-26 | End: 2025-04-30

## 2025-04-26 RX ORDER — MELATONIN 5 MG
3 TABLET ORAL AT BEDTIME
Refills: 0 | Status: DISCONTINUED | OUTPATIENT
Start: 2025-04-26 | End: 2025-04-30

## 2025-04-26 RX ORDER — APIXABAN 2.5 MG/1
5 TABLET, FILM COATED ORAL EVERY 12 HOURS
Refills: 0 | Status: DISCONTINUED | OUTPATIENT
Start: 2025-04-26 | End: 2025-04-30

## 2025-04-26 RX ORDER — APIXABAN 2.5 MG/1
5 TABLET, FILM COATED ORAL EVERY 12 HOURS
Refills: 0 | Status: DISCONTINUED | OUTPATIENT
Start: 2025-04-26 | End: 2025-04-26

## 2025-04-26 RX ORDER — METOPROLOL SUCCINATE 50 MG/1
50 TABLET, EXTENDED RELEASE ORAL DAILY
Refills: 0 | Status: DISCONTINUED | OUTPATIENT
Start: 2025-04-26 | End: 2025-04-30

## 2025-04-26 RX ORDER — ACETAMINOPHEN 500 MG/5ML
650 LIQUID (ML) ORAL EVERY 6 HOURS
Refills: 0 | Status: DISCONTINUED | OUTPATIENT
Start: 2025-04-26 | End: 2025-04-30

## 2025-04-26 RX ADMIN — APIXABAN 5 MILLIGRAM(S): 2.5 TABLET, FILM COATED ORAL at 17:31

## 2025-04-26 RX ADMIN — AMPICILLIN SODIUM AND SULBACTAM SODIUM 200 GRAM(S): 1; .5 INJECTION, POWDER, FOR SOLUTION INTRAMUSCULAR; INTRAVENOUS at 22:57

## 2025-04-26 RX ADMIN — ATORVASTATIN CALCIUM 40 MILLIGRAM(S): 80 TABLET, FILM COATED ORAL at 22:57

## 2025-04-26 RX ADMIN — Medication 15 MILLILITER(S): at 18:22

## 2025-04-26 RX ADMIN — DONEPEZIL HYDROCHLORIDE 10 MILLIGRAM(S): 5 TABLET ORAL at 22:57

## 2025-04-26 RX ADMIN — Medication 500 MILLIGRAM(S): at 03:04

## 2025-04-26 RX ADMIN — AMPICILLIN SODIUM AND SULBACTAM SODIUM 200 GRAM(S): 1; .5 INJECTION, POWDER, FOR SOLUTION INTRAMUSCULAR; INTRAVENOUS at 00:20

## 2025-04-26 RX ADMIN — Medication 250 MILLIGRAM(S): at 01:34

## 2025-04-26 RX ADMIN — Medication 15 MILLILITER(S): at 05:46

## 2025-04-26 RX ADMIN — AMPICILLIN SODIUM AND SULBACTAM SODIUM 200 GRAM(S): 1; .5 INJECTION, POWDER, FOR SOLUTION INTRAMUSCULAR; INTRAVENOUS at 12:58

## 2025-04-26 NOTE — H&P ADULT - PROBLEM SELECTOR PLAN 1
- continue Unasyn IV  - f/u OMFS s/p I&D in ED by OMFS  - continue Unasyn IV  - f/u OMFS s/p I&D in ED by OMFS  - continue Unasyn IV  - chlorhexidine rinses BID   - f/u wound culture   - f/u OMFS  - discontinue dentures s/p I&D and fistula irrigation with vancomycin in ED by OMFS  - continue Unasyn IV  - chlorhexidine rinses BID   - f/u wound culture   - f/u OMFS  - discontinue dentures

## 2025-04-26 NOTE — PROGRESS NOTE ADULT - SUBJECTIVE AND OBJECTIVE BOX
ORAL & MAXILLOFACIAL SURGERY PROGRESS NOTE    Cutaneous abscess of face        RASHEED RODRIGUEZ  |  [id number]      Patient is a 85y Female post op s/p incision and drainage of left cheek.        S: YANETH overnight. Pt seen and evaluated bedside this AM. Pt resting comfortably on exam. Tolerating Diet. Ambulating and voiding without issue. Pain well controlled.    MEDICATIONS  (STANDING):  ampicillin/sulbactam  IVPB 3 Gram(s) IV Intermittent every 12 hours  chlorhexidine 0.12% Liquid 15 milliLiter(s) Swish and Spit two times a day  chlorhexidine 2% Cloths 1 Application(s) Topical daily    MEDICATIONS  (PRN):  acetaminophen     Tablet .. 650 milliGRAM(s) Oral every 6 hours PRN Temp greater or equal to 38C (100.4F), Mild Pain (1 - 3)  melatonin 3 milliGRAM(s) Oral at bedtime PRN Insomnia      O:   Vital Signs Last 24 Hrs  T(C): 36.5 (26 Apr 2025 04:30), Max: 37.1 (25 Apr 2025 20:49)  T(F): 97.7 (26 Apr 2025 04:30), Max: 98.7 (25 Apr 2025 20:49)  HR: 65 (26 Apr 2025 04:30) (63 - 72)  BP: 133/60 (26 Apr 2025 04:30) (133/60 - 148/61)  BP(mean): --  RR: 17 (26 Apr 2025 04:30) (17 - 18)  SpO2: 98% (26 Apr 2025 04:30) (97% - 100%)    Parameters below as of 26 Apr 2025 04:30  Patient On (Oxygen Delivery Method): room air        Constitutional: Well nourished, no acute distress.  Neuro: AAOx3, CN II-XII grossly intact  Head: Normocephalic, atraumatic  Eyes: EOMI, PERRL, Direct and consensual pupillary reflex  Ears: hearing grossly intact b/l  Nose: nares patent  Neck: trachea midline, no cervical LAD  Respiratory: symmetry of respiratory movements, adequate depth and quality, no acute  respiratory distress    Extra Oral Exam: SILVINO ~30mm. no trismus, no LAD, inferior border of the mandible fully  palpable, draining fistula left cheek  Intra Oral Exam: uvula midline, significant epilus fissuratum, no dentition, no s/s of  infection, no tiki purulence. FOM s/nt/ne, severely malodorous denture with severe calculus buildup                          10.3   9.30  )-----------( 206      ( 26 Apr 2025 07:03 )             31.7     04-26    140  |  105  |  26[H]  ----------------------------<  91  4.1   |  21[L]  |  1.32[H]    Ca    9.2      26 Apr 2025 07:03  Phos  3.5     04-26  Mg     2.30     04-26            IMAGING STUDIES:

## 2025-04-26 NOTE — H&P ADULT - HISTORY OF PRESENT ILLNESS
85F, from [facility], with history of dementia, CAD s/p stent 11/2019, HFpEF, severe AS, HTN, HLD, CKD3, and DVT/saddle PE in 10/2024, transferred from [place] ED for OMFS evaluation for left cheek abscess. Pt unable to provide history, was unable to reach son overnight, history per chart review Pt reportedly struck by soccer ball kicked by her great-grandson 4 days ago. With draining abscess. No fevers.     CBC/BMP from [place] reviewed on HIE, stable from prior. Received IV Unasyn at OSH. Noted to have poorly maintained dentures which were removed in the ED. Evaluated by OMFS in the ED s/p I&D. 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

## 2025-04-26 NOTE — H&P ADULT - NSICDXPASTMEDICALHX_GEN_ALL_CORE_FT
PAST MEDICAL HISTORY:  CAD (coronary artery disease)     History of pulmonary embolus (PE)     HLD (hyperlipidemia)     Hypertension     Stage 3 chronic kidney disease

## 2025-04-26 NOTE — PROGRESS NOTE ADULT - PROBLEM SELECTOR PLAN 3
Diet: f/u OMFS prior to starting pureed diet (since no dentures)  DVT ppx: f/u full anticoagulation as above   Dispo: pending clinical course    Note incomplete without attending attestation. Hx of CAD s/p stent 11/2019  Also w/ hx of HFpEF and severe AS     #CAD #HFpEF   -c/w ASA 81mg   -c/w metoprolol succinate 50mg qd Hx of CAD s/p stent 11/2019  Also w/ hx of HFpEF and severe AS     #CAD #HFpEF   -c/w home ASA 81mg   -c/w home metoprolol succinate 50mg qd

## 2025-04-26 NOTE — H&P ADULT - PROBLEM SELECTOR PLAN 2
was hospitalized at [facility] in 10/2024 with saddle PE with RHS, was managed with heparin gtt and discharged on Eliquis  - given I&D, f/u OMFS when safe to restart anticoagulation

## 2025-04-26 NOTE — PROGRESS NOTE ADULT - PROBLEM SELECTOR PLAN 7
Diet: Full liquid   DVT ppx: Eliquis   Dispo: pending clinical course    Note incomplete without attending attestation. Diet: Full liquid   DVT ppx: Eliquis   Dispo: pending clinical course

## 2025-04-26 NOTE — CONSULT NOTE ADULT - ASSESSMENT
84 y/o F with severe epilus fissuratum with fistula formation due to excessive denture wear and poor denture hygiene    - Sites were cultures and tissue samples taken for biopsy confirmation  - fistula was irrigated with Vancomycin  - C/W Unasyn   - Admit for observation  - OMFS will re evaluate in the AM    Ovi Montes  Oral and Maxillofacial Surgery  [facility] OMFS Pager #[phone]  [facility] Pager: [phone]  Available on Teams   84 y/o F with severe epilus fissuratum with fistula formation due to excessive denture wear and poor denture hygiene    - Sites were cultures and tissue samples taken for biopsy confirmation  - fistula was irrigated with Vancomycin  - C/W Unasyn   - Admit for observation  - OMFS will re evaluate in the AM  - Patient to no longer wear denture  - Chlorhexidine rinse BID    Ovi Montes  Oral and Maxillofacial Surgery  [facility] Pager #[phone]  [facility] Pager: [phone]  Available on Teams

## 2025-04-26 NOTE — PROGRESS NOTE ADULT - PROBLEM SELECTOR PLAN 1
Detail Level: Detailed Quality 130: Documentation Of Current Medications In The Medical Record: Current Medications Documented s/p I&D and fistula irrigation with vancomycin in ED by OMFS  - continue Unasyn IV  - chlorhexidine rinses BID   - f/u wound culture   - discontinue denture  - OMFS following s/p I&D and fistula irrigation with vancomycin in ED by OMFS    - continue Unasyn IV  - chlorhexidine rinses BID   - f/u wound culture   - discontinue denture, full liquid diet for now  - OMFS following s/p I&D and fistula irrigation with vancomycin in ED by OMFS    - continue Unasyn IV per OMFS recs   - chlorhexidine rinses BID   - f/u wound culture   - discontinue denture, full liquid diet for now  - OMFS following

## 2025-04-26 NOTE — PATIENT PROFILE ADULT - FUNCTIONAL ASSESSMENT - BASIC MOBILITY 2.
3 = A little assistance
PAST MEDICAL HISTORY:  CAD (coronary artery disease)     HTN (hypertension)     Type 2 diabetes mellitus

## 2025-04-26 NOTE — PROGRESS NOTE ADULT - SUBJECTIVE AND OBJECTIVE BOX
Patient is a 85y old  Female who presents with a chief complaint of abscess (26 Apr 2025 02:48)      ======Overnight/Subjective======  Overnight- admitted to medicine, s/p I&D     Subjective- pt seen and examined at beside.     Brief daily plan- f/u wound cx     ======Medications======  MEDICATIONS  (STANDING):  ampicillin/sulbactam  IVPB 3 Gram(s) IV Intermittent every 12 hours  chlorhexidine 0.12% Liquid 15 milliLiter(s) Swish and Spit two times a day  chlorhexidine 2% Cloths 1 Application(s) Topical daily    MEDICATIONS  (PRN):  acetaminophen     Tablet .. 650 milliGRAM(s) Oral every 6 hours PRN Temp greater or equal to 38C (100.4F), Mild Pain (1 - 3)  melatonin 3 milliGRAM(s) Oral at bedtime PRN Insomnia      ======Vital Signs======  T(C): 36.5 (04-26-25 @ 04:30), Max: 37.1 (04-25-25 @ 20:49)  T(F): 97.7 (04-26-25 @ 04:30), Max: 98.7 (04-25-25 @ 20:49)  HR: 65 (04-26-25 @ 04:30) (63 - 72)  BP: 133/60 (04-26-25 @ 04:30) (133/60 - 148/61)  BP(mean): --  RR: 17 (04-26-25 @ 04:30) (17 - 18)  SpO2: 98% (04-26-25 @ 04:30) (97% - 100%)    ======Physical exams======    General: NAD, non-toxic appearing   HEENT: PERRL, no scleral icterus, +open wound (fistula) in L cheek, +marked erythema along L buccal mucosa   CV: RRR, normal S1 and S2  Lungs: normal respiratory effort on RA, CTAB  Abd: soft, nontender, nondistended  Ext: no edema, 2+ peripheral pulses   Psych: AAOx1 (name, hotel, 2012), calm  Neuro: grossly non-focal, moving all extremities spontaneously   Skin: no rashes or lesions   Patient is a 85y old  Female who presents with a chief complaint of abscess (26 Apr 2025 02:48)      ======Overnight/Subjective======  Overnight- admitted to medicine, s/p I&D     Subjective- pt seen and examined at beside.     Brief daily plan- f/u wound cx     ======Medications======  MEDICATIONS  (STANDING):  ampicillin/sulbactam  IVPB 3 Gram(s) IV Intermittent every 12 hours  chlorhexidine 0.12% Liquid 15 milliLiter(s) Swish and Spit two times a day  chlorhexidine 2% Cloths 1 Application(s) Topical daily    MEDICATIONS  (PRN):  acetaminophen     Tablet .. 650 milliGRAM(s) Oral every 6 hours PRN Temp greater or equal to 38C (100.4F), Mild Pain (1 - 3)  melatonin 3 milliGRAM(s) Oral at bedtime PRN Insomnia      ======Vital Signs======  T(C): 36.5 (04-26-25 @ 04:30), Max: 37.1 (04-25-25 @ 20:49)  T(F): 97.7 (04-26-25 @ 04:30), Max: 98.7 (04-25-25 @ 20:49)  HR: 65 (04-26-25 @ 04:30) (63 - 72)  BP: 133/60 (04-26-25 @ 04:30) (133/60 - 148/61)  BP(mean): --  RR: 17 (04-26-25 @ 04:30) (17 - 18)  SpO2: 98% (04-26-25 @ 04:30) (97% - 100%)    ======Physical exams======    General: NAD, non-toxic appearing   HEENT: PERRL, no scleral icterus, +I&D site covered in bandage +marked erythema and blood clot along L buccal mucosa   CV: RRR, normal S1 and S2  Lungs: normal respiratory effort on RA, CTAB  Abd: soft, nontender, nondistended  Ext: no edema, 2+ peripheral pulses   Psych: AAOx2-3 (knows everything but year), calm  Neuro: grossly non-focal, moving all extremities spontaneously   Skin: no rashes or lesions

## 2025-04-26 NOTE — PROGRESS NOTE ADULT - PROBLEM SELECTOR PLAN 2
was hospitalized at [facility] in 10/2024 with saddle PE with RHS, was managed with heparin gtt and discharged on Eliquis  - given I&D, f/u OMFS when safe to restart anticoagulation  - Eliquis held at this time. was hospitalized at [facility] in 10/2024 with saddle PE with RHS, was managed with heparin gtt and discharged on Eliquis    - c/w home Eliquis 5mg BID was hospitalized at [facility] in 10/2024 with saddle PE with RHS, was managed with heparin gtt and discharged on Eliquis    - c/w home Eliquis 5mg BID (discussed w/ OMFS, ok to resume)

## 2025-04-26 NOTE — PATIENT PROFILE ADULT - FALL HARM RISK - HARM RISK INTERVENTIONS
Assistance with ambulation/Assistance OOB with selected safe patient handling equipment/Communicate Risk of Fall with Harm to all staff/Discuss with provider need for PT consult/Monitor for mental status changes/Monitor gait and stability/Reinforce activity limits and safety measures with patient and family/Reorient to person, place and time as needed/Review medications for side effects contributing to fall risk/Sit up slowly, dangle for a short time, stand at bedside before walking/Tailored Fall Risk Interventions/Toileting schedule using arm’s reach rule for commode and bathroom/Use of alarms - bed, chair and/or voice tab/Visual Cue: Yellow wristband and red socks/Bed in lowest position, wheels locked, appropriate side rails in place/Call bell, personal items and telephone in reach/Instruct patient to call for assistance before getting out of bed or chair/Non-slip footwear when patient is out of bed/[place] to call system/Physically safe environment - no spills, clutter or unnecessary equipment/Purposeful Proactive Rounding/Room/bathroom lighting operational, light cord in reach

## 2025-04-26 NOTE — H&P ADULT - NSHPPHYSICALEXAM_GEN_ALL_CORE
VITALS:   Vital Signs Last 24 Hrs  T(C): 36.5 (26 Apr 2025 04:30), Max: 37.1 (25 Apr 2025 20:49)  T(F): 97.7 (26 Apr 2025 04:30), Max: 98.7 (25 Apr 2025 20:49)  HR: 65 (26 Apr 2025 04:30) (63 - 72)  BP: 133/60 (26 Apr 2025 04:30) (133/60 - 148/61)  BP(mean): --  RR: 17 (26 Apr 2025 04:30) (17 - 18)  SpO2: 98% (26 Apr 2025 04:30) (97% - 100%)    Parameters below as of 26 Apr 2025 04:30  Patient On (Oxygen Delivery Method): room air    I&O's Summary    CAPILLARY BLOOD GLUCOSE    Physical Exam:  General: NAD, non-toxic appearing   HEENT: PERRL, no scleral icterus, +open wound (fistula) in L cheek, +marked erythema along L buccal mucosa   CV: RRR, normal S1 and S2  Lungs: normal respiratory effort on RA, CTAB  Abd: soft, nontender, nondistended  Ext: no edema, 2+ peripheral pulses   Psych: AAOx1, calm  Neuro: grossly non-focal, moving all extremities spontaneously   Skin: no rashes or lesions VITALS:   Vital Signs Last 24 Hrs  T(C): 36.5 (26 Apr 2025 04:30), Max: 37.1 (25 Apr 2025 20:49)  T(F): 97.7 (26 Apr 2025 04:30), Max: 98.7 (25 Apr 2025 20:49)  HR: 65 (26 Apr 2025 04:30) (63 - 72)  BP: 133/60 (26 Apr 2025 04:30) (133/60 - 148/61)  BP(mean): --  RR: 17 (26 Apr 2025 04:30) (17 - 18)  SpO2: 98% (26 Apr 2025 04:30) (97% - 100%)    Parameters below as of 26 Apr 2025 04:30  Patient On (Oxygen Delivery Method): room air    I&O's Summary    CAPILLARY BLOOD GLUCOSE    Physical Exam:  General: NAD, non-toxic appearing   HEENT: PERRL, no scleral icterus, +open wound (fistula) in L cheek, +marked erythema along L buccal mucosa   CV: RRR, normal S1 and S2  Lungs: normal respiratory effort on RA, CTAB  Abd: soft, nontender, nondistended  Ext: no edema, 2+ peripheral pulses   Psych: AAOx1 (name, stephanie, 2012), calm  Neuro: grossly non-focal, moving all extremities spontaneously   Skin: no rashes or lesions

## 2025-04-26 NOTE — PATIENT PROFILE ADULT - STATED REASON FOR ADMISSION
Ambulatory Visit [facility] # [id number]  Name: Neetu Beauchamp      : 2008      MRN: [id number]  Encounter Provider: Beatrice Webster MD  Encounter Date: 10/17/2024   Encounter department: [facility]    Assessment & Plan  History of abnormal weight loss       10/5/22:109 lb  24:102  10/8/24:96 lb 9.6 oz   10/17/24:100 lb 9.6 oz   Gained 4 lbs in 1 week   Continue healthy diet ,f/p 3 months for weight check   Iron deficiency anemia secondary to inadequate dietary iron intake       CBC on 10/8/24 shows H/H 10.5/35.2,MCV 35.2,RDW elevated   Iron studies :Iron 18 ,TIBC elevated ,iron sat decreased ,Ferritin 3   Treatment with ferrous sulfate x 3 months ,eat iron rich foods ,will repeat cbc in 3 months     History of Present Illness     Neetu Beauchamp is a 16 y.o. female who presents for f/p recent weight loss and loss of appetite ,she states that her appetite has improved and she is eating 3-4 times a day ,no vomiting /diarrhea or constipation ,no fever ,vaginal discharge has resolved ,she still has one bruise on right arm and 2-3 bruises on right leg ,she recalls hitting her right leg on the side of bed recently       Review of Systems   Constitutional:  Negative for chills and fever.   HENT:  Negative for ear pain and sore throat.    Eyes:  Negative for pain and visual disturbance.   Respiratory:  Negative for cough and shortness of breath.    Cardiovascular:  Negative for chest pain and palpitations.   Gastrointestinal:  Negative for abdominal distention, abdominal pain, anal bleeding, blood in stool, constipation, diarrhea, nausea, rectal pain and vomiting.   Genitourinary:  Negative for dysuria and hematuria.   Musculoskeletal:  Negative for arthralgias and back pain.   Skin:  Negative for color change and rash.   Neurological:  Negative for seizures and syncope.   All other systems reviewed and are negative.          Objective     BP (!) 100/64   Temp 98.7 °F (37.1 °C)   Ht 5'  swelling left cheek "3.15\" (1.604 m)   Wt 45.6 kg (100 lb 9.6 oz)   LMP 09/20/2024 (Approximate)   BMI 17.74 kg/m²     Physical Exam  Vitals and nursing note reviewed.   Constitutional:       General: She is not in acute distress.     Appearance: She is well-developed.   HENT:      Head: Normocephalic and atraumatic.      Right Ear: Tympanic membrane, ear canal and external ear normal.      Left Ear: Tympanic membrane, ear canal and external ear normal.      Nose: Nose normal.      Mouth/Throat:      Pharynx: Oropharynx is clear.   Eyes:      Extraocular Movements: Extraocular movements intact.      Conjunctiva/sclera: Conjunctivae normal.   Cardiovascular:      Rate and Rhythm: Normal rate and regular rhythm.      Heart sounds: No murmur heard.  Pulmonary:      Effort: Pulmonary effort is normal. No respiratory distress.      Breath sounds: Normal breath sounds.   Abdominal:      Palpations: Abdomen is soft.      Tenderness: There is no abdominal tenderness.   Musculoskeletal:         General: No swelling.      Cervical back: Neck supple.   Skin:     General: Skin is warm and dry.      Capillary Refill: Capillary refill takes less than 2 seconds.      Findings: Bruising present.      Comments: One bluish circular bruise 2 cm x 2 cm on right arm ,2 small circular bluish to yellowish bruises on right shin    Neurological:      General: No focal deficit present.      Mental Status: She is alert and oriented to person, place, and time.   Psychiatric:         Mood and Affect: Mood normal.         "

## 2025-04-26 NOTE — H&P ADULT - PROBLEM SELECTOR PLAN 3
Diet: f/u OMFS prior to starting pureed diet (since no dentures)  DVT ppx: f/u full anticoagulation as above   Dispo: pending clinical course

## 2025-04-26 NOTE — H&P ADULT - ASSESSMENT
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

## 2025-04-26 NOTE — PATIENT PROFILE ADULT - DO YOU FEEL THREATENED BY OTHERS?
The patient's goals for the shift include  improvement in breathing    The clinical goals for the shift include not get up on his own    Over the shift, the patient did not make progress toward the following goals. Barriers to progression include confusion. Recommendations to address these barriers include carlosior labs     no

## 2025-04-26 NOTE — CONSULT NOTE ADULT - SUBJECTIVE AND OBJECTIVE BOX
86 y/o F with PMH of dementia, HTN, HLD, CKD3, CAD s/p PCI to RCA with ARBEN (11/2019) on ASA, severe AS, mild LVH, grade II LV diastolic dysfunction, mild-mod MR, mod TR, mild pulm HTN, recently admitted at [facility] 10/10-10/15 for saddle PE started on Eliquis Presents to the ED today with left cheek abscess pt states she was hit by h er grandson soccer ball 4 years ago, sent from [facility] living for drainage from face. After speaking with son, it was determined that patient has been confabulating stories and that she was never hit in the face with a soccer ball and that she has had this same infection 6 months ago. During evaluation it was determined through discussion with patient that she never removes her denture no matter what.    Constitutional: Well nourished, no acute distress.  Neuro: AAOx3, CN II-XII grossly intact  Head: Normocephalic, atraumatic  Eyes: EOMI, PERRL, Direct and consensual pupillary reflex  Ears: hearing grossly intact b/l  Nose: nares patent  Neck: trachea midline, no cervical LAD  Respiratory: symmetry of respiratory movements, adequate depth and quality, no acute  respiratory distress    Extra Oral Exam: SILVINO ~30mm. no trismus, no LAD, inferior border of the mandible fully  palpable, draining fistula left cheek  Intra Oral Exam: uvula midline, significant epilus fissuratum, no dentition, no s/s of  infection, no tiki purulence. FOM s/nt/ne, severely malodorous denture with severe calculus buildup      Vitals:     Vital Signs Last 24 Hrs  T(C): 36.9 (25 Apr 2025 22:47), Max: 37.1 (25 Apr 2025 20:49)  T(F): 98.4 (25 Apr 2025 22:47), Max: 98.7 (25 Apr 2025 20:49)  HR: 63 (25 Apr 2025 22:47) (63 - 66)  BP: 148/61 (25 Apr 2025 22:47) (147/63 - 148/61)  BP(mean): --  RR: 18 (25 Apr 2025 22:47) (18 - 18)  SpO2: 99% (25 Apr 2025 22:47) (97% - 99%)    Parameters below as of 25 Apr 2025 22:47  Patient On (Oxygen Delivery Method): room air        I&O's Detail      Medications:    MEDICATIONS  (STANDING):    MEDICATIONS  (PRN):  acetaminophen     Tablet .. 650 milliGRAM(s) Oral every 6 hours PRN Temp greater or equal to 38C (100.4F), Mild Pain (1 - 3)  melatonin 3 milliGRAM(s) Oral at bedtime PRN Insomnia      Labs:

## 2025-04-27 LAB
ANION GAP SERPL CALC-SCNC: 12 MMOL/L — SIGNIFICANT CHANGE UP (ref 7–14)
BUN SERPL-MCNC: 24 MG/DL — HIGH (ref 7–23)
CALCIUM SERPL-MCNC: 9.7 MG/DL — SIGNIFICANT CHANGE UP (ref 8.4–10.5)
CHLORIDE SERPL-SCNC: 105 MMOL/L — SIGNIFICANT CHANGE UP (ref 98–107)
CO2 SERPL-SCNC: 24 MMOL/L — SIGNIFICANT CHANGE UP (ref 22–31)
CREAT SERPL-MCNC: 1.37 MG/DL — HIGH (ref 0.5–1.3)
EGFR: 38 ML/MIN/1.73M2 — LOW
EGFR: 38 ML/MIN/1.73M2 — LOW
GLUCOSE SERPL-MCNC: 85 MG/DL — SIGNIFICANT CHANGE UP (ref 70–99)
HCT VFR BLD CALC: 36.1 % — SIGNIFICANT CHANGE UP (ref 34.5–45)
HGB BLD-MCNC: 11.4 G/DL — LOW (ref 11.5–15.5)
MAGNESIUM SERPL-MCNC: 2.6 MG/DL — SIGNIFICANT CHANGE UP (ref 1.6–2.6)
MCHC RBC-ENTMCNC: 28.1 PG — SIGNIFICANT CHANGE UP (ref 27–34)
MCHC RBC-ENTMCNC: 31.6 G/DL — LOW (ref 32–36)
MCV RBC AUTO: 88.9 FL — SIGNIFICANT CHANGE UP (ref 80–100)
NRBC # BLD AUTO: 0 K/UL — SIGNIFICANT CHANGE UP (ref 0–0)
NRBC # FLD: 0 K/UL — SIGNIFICANT CHANGE UP (ref 0–0)
NRBC BLD AUTO-RTO: 0 /100 WBCS — SIGNIFICANT CHANGE UP (ref 0–0)
PHOSPHATE SERPL-MCNC: 3.7 MG/DL — SIGNIFICANT CHANGE UP (ref 2.5–4.5)
PLATELET # BLD AUTO: 222 K/UL — SIGNIFICANT CHANGE UP (ref 150–400)
POTASSIUM SERPL-MCNC: 4.2 MMOL/L — SIGNIFICANT CHANGE UP (ref 3.5–5.3)
POTASSIUM SERPL-SCNC: 4.2 MMOL/L — SIGNIFICANT CHANGE UP (ref 3.5–5.3)
RBC # BLD: 4.06 M/UL — SIGNIFICANT CHANGE UP (ref 3.8–5.2)
RBC # FLD: 14.4 % — SIGNIFICANT CHANGE UP (ref 10.3–14.5)
SODIUM SERPL-SCNC: 141 MMOL/L — SIGNIFICANT CHANGE UP (ref 135–145)
WBC # BLD: 7.42 K/UL — SIGNIFICANT CHANGE UP (ref 3.8–10.5)
WBC # FLD AUTO: 7.42 K/UL — SIGNIFICANT CHANGE UP (ref 3.8–10.5)

## 2025-04-27 RX ADMIN — Medication 81 MILLIGRAM(S): at 12:43

## 2025-04-27 RX ADMIN — Medication 15 MILLILITER(S): at 06:34

## 2025-04-27 RX ADMIN — APIXABAN 5 MILLIGRAM(S): 2.5 TABLET, FILM COATED ORAL at 17:26

## 2025-04-27 RX ADMIN — DONEPEZIL HYDROCHLORIDE 10 MILLIGRAM(S): 5 TABLET ORAL at 22:19

## 2025-04-27 RX ADMIN — AMPICILLIN SODIUM AND SULBACTAM SODIUM 200 GRAM(S): 1; .5 INJECTION, POWDER, FOR SOLUTION INTRAMUSCULAR; INTRAVENOUS at 12:43

## 2025-04-27 RX ADMIN — APIXABAN 5 MILLIGRAM(S): 2.5 TABLET, FILM COATED ORAL at 06:34

## 2025-04-27 RX ADMIN — ATORVASTATIN CALCIUM 40 MILLIGRAM(S): 80 TABLET, FILM COATED ORAL at 22:19

## 2025-04-27 RX ADMIN — METOPROLOL SUCCINATE 50 MILLIGRAM(S): 50 TABLET, EXTENDED RELEASE ORAL at 06:35

## 2025-04-27 RX ADMIN — AMPICILLIN SODIUM AND SULBACTAM SODIUM 200 GRAM(S): 1; .5 INJECTION, POWDER, FOR SOLUTION INTRAMUSCULAR; INTRAVENOUS at 22:21

## 2025-04-27 RX ADMIN — Medication 15 MILLILITER(S): at 17:25

## 2025-04-27 NOTE — PROGRESS NOTE ADULT - PROBLEM SELECTOR PLAN 1
s/p I&D and fistula irrigation with vancomycin in ED by OMFS    - continue Unasyn IV per OMFS recs   - chlorhexidine rinses BID   - f/u wound culture   - discontinue denture, full liquid diet for now  - OMFS following s/p I&D and fistula irrigation with vancomycin in ED by OMFS    - continue Unasyn IV per OMFS recs   - chlorhexidine rinses BID   - f/u wound culture   - discontinue denture, full liquid diet for now   - OMFS following

## 2025-04-27 NOTE — PROGRESS NOTE ADULT - PROBLEM SELECTOR PLAN 3
Hx of CAD s/p stent 11/2019  Also w/ hx of HFpEF and severe AS     #CAD #HFpEF   -c/w home ASA 81mg   -c/w home metoprolol succinate 50mg qd

## 2025-04-27 NOTE — PROGRESS NOTE ADULT - SUBJECTIVE AND OBJECTIVE BOX
Patient is a 85y old  Female who presents with a chief complaint of cheek abscess (27 Apr 2025 10:32)      INTERVAL HX:  - no acute events overnight  - doing well without pain  - would like to advance diet      Allergies:  No Known Allergies    Medications:  acetaminophen     Tablet .. 650 milliGRAM(s) Oral every 6 hours PRN  ampicillin/sulbactam  IVPB 3 Gram(s) IV Intermittent every 12 hours  apixaban 5 milliGRAM(s) Oral every 12 hours  aspirin enteric coated 81 milliGRAM(s) Oral daily  atorvastatin 40 milliGRAM(s) Oral at bedtime  chlorhexidine 0.12% Liquid 15 milliLiter(s) Swish and Spit two times a day  chlorhexidine 2% Cloths 1 Application(s) Topical daily  donepezil 10 milliGRAM(s) Oral at bedtime  melatonin 3 milliGRAM(s) Oral at bedtime PRN  metoprolol succinate ER 50 milliGRAM(s) Oral daily    Vitals:  T(C): 36.5 (04-27-25 @ 06:06), Max: 36.7 (04-26-25 @ 13:35)  HR: 64 (04-27-25 @ 06:06) (60 - 64)  BP: 153/58 (04-27-25 @ 06:06) (142/60 - 153/58)  RR: 16 (04-27-25 @ 06:06) (16 - 17)  SpO2: 100% (04-27-25 @ 06:06) (99% - 100%)  I/O's:    Physical Exam:  General: NAD, non-toxic appearing   HEENT: PERRL, no scleral icterus, +I&D site covered in bandage +marked erythema and blood clot along L buccal mucosa   CV: RRR, normal S1 and S2  Lungs: normal respiratory effort on RA, CTAB  Abd: soft, nontender, nondistended  Ext: no edema, 2+ peripheral pulses   Psych: AAOx2-3 (knows everything but year), calm  Neuro: grossly non-focal, moving all extremities spontaneously   Skin: no rashes or lesions    Labs:                        11.4   7.42  )-----------( 222      ( 27 Apr 2025 03:35 )             36.1     04-27    141  |  105  |  24[H]  ----------------------------<  85  4.2   |  24  |  1.37[H]    Ca    9.7      27 Apr 2025 03:35  Phos  3.7     04-27  Mg     2.60     04-27    Radiology/Procedures: Reviewed.

## 2025-04-27 NOTE — PROGRESS NOTE ADULT - PROBLEM SELECTOR PLAN 2
was hospitalized at [facility] in 10/2024 with saddle PE with RHS, was managed with heparin gtt and discharged on Eliquis    - c/w home Eliquis 5mg BID (discussed w/ OMFS, ok to resume)

## 2025-04-27 NOTE — PROGRESS NOTE ADULT - SUBJECTIVE AND OBJECTIVE BOX
Patient is a 85y Female post op s/p incision and drainage of left cheek.        S: YANETH overnight. Pt seen and evaluated bedside this AM. Pt resting comfortably on exam. Tolerating Diet. Ambulating and voiding without issue. Pain well controlled.      Constitutional: Well nourished, no acute distress.  Neuro: AAOx3, CN II-XII grossly intact  Head: Normocephalic, atraumatic  Eyes: EOMI, PERRL, Direct and consensual pupillary reflex  Ears: hearing grossly intact b/l  Nose: nares patent  Neck: trachea midline, no cervical LAD  Respiratory: symmetry of respiratory movements, adequate depth and quality, no acute  respiratory distress    Extra Oral Exam: SILVINO ~30mm. no trismus, no LAD, inferior border of the mandible fully  palpable, draining fistula left cheek  Intra Oral Exam: uvula midline, significant epilus fissuratum, no dentition, no s/s of  infection, no tiki purulence. FOM s/nt/ne, severely malodorous denture with severe calculus buildup        Vitals:     Vital Signs Last 24 Hrs  T(C): 36.5 (27 Apr 2025 06:06), Max: 36.7 (26 Apr 2025 13:35)  T(F): 97.7 (27 Apr 2025 06:06), Max: 98.1 (26 Apr 2025 13:35)  HR: 64 (27 Apr 2025 06:06) (60 - 64)  BP: 153/58 (27 Apr 2025 06:06) (142/60 - 153/58)  BP(mean): --  RR: 16 (27 Apr 2025 06:06) (16 - 17)  SpO2: 100% (27 Apr 2025 06:06) (99% - 100%)    Parameters below as of 27 Apr 2025 06:06  Patient On (Oxygen Delivery Method): room air        I&O's Detail      Medications:    MEDICATIONS  (STANDING):  ampicillin/sulbactam  IVPB 3 Gram(s) IV Intermittent every 12 hours  apixaban 5 milliGRAM(s) Oral every 12 hours  aspirin enteric coated 81 milliGRAM(s) Oral daily  atorvastatin 40 milliGRAM(s) Oral at bedtime  chlorhexidine 0.12% Liquid 15 milliLiter(s) Swish and Spit two times a day  chlorhexidine 2% Cloths 1 Application(s) Topical daily  donepezil 10 milliGRAM(s) Oral at bedtime  metoprolol succinate ER 50 milliGRAM(s) Oral daily    MEDICATIONS  (PRN):  acetaminophen     Tablet .. 650 milliGRAM(s) Oral every 6 hours PRN Temp greater or equal to 38C (100.4F), Mild Pain (1 - 3)  melatonin 3 milliGRAM(s) Oral at bedtime PRN Insomnia      Labs:                          11.4   7.42  )-----------( 222      ( 27 Apr 2025 03:35 )             36.1       04-27    141  |  105  |  24[H]  ----------------------------<  85  4.2   |  24  |  1.37[H]    Ca    9.7      27 Apr 2025 03:35  Phos  3.7     04-27  Mg     2.60     04-27

## 2025-04-28 ENCOUNTER — TRANSCRIPTION ENCOUNTER (OUTPATIENT)
Age: 85
End: 2025-04-28

## 2025-04-28 LAB
ANION GAP SERPL CALC-SCNC: 13 MMOL/L — SIGNIFICANT CHANGE UP (ref 7–14)
BUN SERPL-MCNC: 23 MG/DL — SIGNIFICANT CHANGE UP (ref 7–23)
CALCIUM SERPL-MCNC: 9.4 MG/DL — SIGNIFICANT CHANGE UP (ref 8.4–10.5)
CHLORIDE SERPL-SCNC: 103 MMOL/L — SIGNIFICANT CHANGE UP (ref 98–107)
CO2 SERPL-SCNC: 22 MMOL/L — SIGNIFICANT CHANGE UP (ref 22–31)
CREAT SERPL-MCNC: 1.45 MG/DL — HIGH (ref 0.5–1.3)
EGFR: 35 ML/MIN/1.73M2 — LOW
EGFR: 35 ML/MIN/1.73M2 — LOW
GLUCOSE SERPL-MCNC: 91 MG/DL — SIGNIFICANT CHANGE UP (ref 70–99)
HCT VFR BLD CALC: 35.6 % — SIGNIFICANT CHANGE UP (ref 34.5–45)
HGB BLD-MCNC: 11.3 G/DL — LOW (ref 11.5–15.5)
MAGNESIUM SERPL-MCNC: 2.6 MG/DL — SIGNIFICANT CHANGE UP (ref 1.6–2.6)
MCHC RBC-ENTMCNC: 28.8 PG — SIGNIFICANT CHANGE UP (ref 27–34)
MCHC RBC-ENTMCNC: 31.7 G/DL — LOW (ref 32–36)
MCV RBC AUTO: 90.6 FL — SIGNIFICANT CHANGE UP (ref 80–100)
NRBC # BLD AUTO: 0 K/UL — SIGNIFICANT CHANGE UP (ref 0–0)
NRBC # FLD: 0 K/UL — SIGNIFICANT CHANGE UP (ref 0–0)
NRBC BLD AUTO-RTO: 0 /100 WBCS — SIGNIFICANT CHANGE UP (ref 0–0)
PHOSPHATE SERPL-MCNC: 3.5 MG/DL — SIGNIFICANT CHANGE UP (ref 2.5–4.5)
PLATELET # BLD AUTO: 241 K/UL — SIGNIFICANT CHANGE UP (ref 150–400)
POTASSIUM SERPL-MCNC: 3.8 MMOL/L — SIGNIFICANT CHANGE UP (ref 3.5–5.3)
POTASSIUM SERPL-SCNC: 3.8 MMOL/L — SIGNIFICANT CHANGE UP (ref 3.5–5.3)
RBC # BLD: 3.93 M/UL — SIGNIFICANT CHANGE UP (ref 3.8–5.2)
RBC # FLD: 14.3 % — SIGNIFICANT CHANGE UP (ref 10.3–14.5)
SODIUM SERPL-SCNC: 138 MMOL/L — SIGNIFICANT CHANGE UP (ref 135–145)
WBC # BLD: 8.24 K/UL — SIGNIFICANT CHANGE UP (ref 3.8–10.5)
WBC # FLD AUTO: 8.24 K/UL — SIGNIFICANT CHANGE UP (ref 3.8–10.5)

## 2025-04-28 RX ADMIN — APIXABAN 5 MILLIGRAM(S): 2.5 TABLET, FILM COATED ORAL at 06:47

## 2025-04-28 RX ADMIN — Medication 15 MILLILITER(S): at 18:06

## 2025-04-28 RX ADMIN — Medication 1 APPLICATION(S): at 11:25

## 2025-04-28 RX ADMIN — AMPICILLIN SODIUM AND SULBACTAM SODIUM 200 GRAM(S): 1; .5 INJECTION, POWDER, FOR SOLUTION INTRAMUSCULAR; INTRAVENOUS at 11:17

## 2025-04-28 RX ADMIN — Medication 15 MILLILITER(S): at 06:48

## 2025-04-28 RX ADMIN — DONEPEZIL HYDROCHLORIDE 10 MILLIGRAM(S): 5 TABLET ORAL at 21:53

## 2025-04-28 RX ADMIN — Medication 81 MILLIGRAM(S): at 11:17

## 2025-04-28 RX ADMIN — ATORVASTATIN CALCIUM 40 MILLIGRAM(S): 80 TABLET, FILM COATED ORAL at 21:53

## 2025-04-28 RX ADMIN — APIXABAN 5 MILLIGRAM(S): 2.5 TABLET, FILM COATED ORAL at 18:06

## 2025-04-28 RX ADMIN — AMPICILLIN SODIUM AND SULBACTAM SODIUM 200 GRAM(S): 1; .5 INJECTION, POWDER, FOR SOLUTION INTRAMUSCULAR; INTRAVENOUS at 23:58

## 2025-04-28 NOTE — DISCHARGE NOTE PROVIDER - HOSPITAL COURSE
Hospital Course:    Important Medication Changes and Reason:    Active or Pending Issues Requiring Follow-up:    Advanced Directives:   [ ] Full code  [ ] DNR  [ ] Hospice    Discharge Diagnoses:         Hospital Course:    85-year-old female resident of [facility] with a history of dementia, coronary artery disease (CAD) with a stent placed in 2019, heart failure with preserved ejection fraction (HFpEF), severe aortic stenosis, hypertension, hyperlipidemia, chronic kidney disease stage 3, and a DVT/saddle pulmonary embolism (PE) in 2024 was transferred for evaluation of a left cheek abscess. 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 Oral and maxillofacial surgery (OMFS) performed an incision and drainage of the abscess and irrigated the fistula with vancomycin in the ED. Wound cultures grew few Streptococcus anginosus and rare Staphylococcus epidermidis. She was started on IV Unasyn and will be discharged on Augmentin 500mg BID for 7 days. Chlorhexidine rinses were recommended twice daily and her dentures were discontinued. She was placed on a full liquid diet. OMFS and wound care are following. Her Eliquis, which was previously prescribed for her DVT/PE, was resumed. Her home medications, including ASA, metoprolol succinate, and donepezil, were continued. Her creatinine was noted to be trending upwards, but remained within her baseline range. Her hemoglobin also remained stable. She will continue on Eliquis for DVT prophylaxis after discharge.    Important Medication Changes and Reason:  1. Augmentin 500mg BID 7 days   2. Chlorhexidine mouthwash twice daily     Active or Pending Issues Requiring Follow-up:  1. OMFS appt May 9th at 8:30am   2. Follow-up with PCP     Discharge Diagnoses: Left cheek abscess          Hospital Course:    85-year-old female resident of [facility] with a history of dementia, coronary artery disease (CAD) with a stent placed in 2019, heart failure with preserved ejection fraction (HFpEF), severe aortic stenosis, hypertension, hyperlipidemia, chronic kidney disease stage 3, and a DVT/saddle pulmonary embolism (PE) in 2024 was transferred for evaluation of a left cheek abscess. 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 Oral and maxillofacial surgery (OMFS) performed an incision and drainage of the abscess and irrigated the fistula with vancomycin in the ED. Wound cultures grew few Streptococcus anginosus and rare Staphylococcus epidermidis. She was started on IV Unasyn and will be discharged on Augmentin 500mg BID for 7 days. Chlorhexidine rinses were recommended twice daily and her dentures were discontinued. She was placed on a full liquid diet; she's allowed to eat anything that is non chew. OMFS and wound care are following. Her Eliquis, which was previously prescribed for her DVT/PE, was resumed. Her home medications, including ASA, metoprolol succinate, and donepezil, were continued. Her creatinine was noted to be trending upwards, but remained within her baseline range. Her hemoglobin also remained stable. She will continue on Eliquis for DVT prophylaxis after discharge.    Important Medication Changes and Reason:  1. Augmentin 500mg BID 7 days   2. Chlorhexidine mouthwash twice daily     Active or Pending Issues Requiring Follow-up:  1. OMFS appt May 9th at 8:30am   2. Follow-up with PCP     Discharge Diagnoses: Left cheek abscess

## 2025-04-28 NOTE — PROGRESS NOTE ADULT - PROBLEM SELECTOR PLAN 2
was hospitalized at [facility] in 10/2024 with saddle PE with RHS, was managed with heparin gtt and discharged on Eliquis    - c/w home Eliquis 5mg BID (discussed w/ OMFS, ok to resume)

## 2025-04-28 NOTE — DISCHARGE NOTE PROVIDER - ATTENDING DISCHARGE PHYSICAL EXAMINATION:
HEENT: PERRL, no scleral icterus, +I&D site covered in bandage +marked erythema and blood clot along L buccal mucosa (improved)   CV: RRR, normal S1 and S2  Lungs: normal respiratory effort on RA, CTAB  Abd: soft, nontender, nondistended  Ext: no edema, 2+ peripheral pulses   Psych: AAOx2-3 (knows everything but year), calm  Neuro: grossly non-focal, moving all extremities spontaneously   Skin: no rashes or lesions

## 2025-04-28 NOTE — DISCHARGE NOTE PROVIDER - PROVIDER TOKENS
PROVIDER:[TOKEN:[[id number]],SCHEDULEDAPPT:[05/09/2025],SCHEDULEDAPPTTIME:[08:30 AM],ESTABLISHEDPATIENT:[T]],PROVIDER:[TOKEN:[[id number]],FOLLOWUP:[1 week],ESTABLISHEDPATIENT:[T]]

## 2025-04-28 NOTE — DISCHARGE NOTE PROVIDER - NSDCFUADDAPPT_GEN_ALL_CORE_FT
APPTS ARE READY TO BE MADE: [x] YES    Best Family or Patient Contact (if needed):    Additional Information about above appointments (if needed):    1: OMFS Dr. Kilgore   2: PCP with Dr. Chand  3:     Other comments or requests:    APPTS ARE READY TO BE MADE: [x] YES    Best Family or Patient Contact (if needed):    Additional Information about above appointments (if needed):    1: OMFS Dr. Kilgore   2: PCP with Dr. Chand  3:     Other comments or requests:   Patient is being transferred. Caregiver will arrange follow up.

## 2025-04-28 NOTE — PROGRESS NOTE ADULT - PROBLEM SELECTOR PLAN 7
Diet: Full liquid   DVT ppx: Eliquis   Dispo: pending clinical course, pending wound care consult Diet: Full liquid   DVT ppx: Eliquis   Dispo: pending clinical course, pending PT eval

## 2025-04-28 NOTE — DISCHARGE NOTE PROVIDER - CARE PROVIDER_API CALL
Wes Kilgore  Oral/Maxillofacial Surgery  [address] [place] [address]  Phone: [phone]  Fax: [phone]  Established Patient  Scheduled Appointment: 05/09/2025 08:30 AM    Stephanie Chand  Internal Medicine  [address] [place] [address], [address]  [place] [address]  Phone: [phone]  Fax: [phone]  Established Patient  Follow Up Time: 1 week

## 2025-04-28 NOTE — DISCHARGE NOTE PROVIDER - NSDCMRMEDTOKEN_GEN_ALL_CORE_FT
aspirin 81 mg oral tablet: 1 tab(s) orally once a day  atorvastatin 40 mg oral tablet: 1 tab(s) orally once a day (at bedtime)  donepezil 10 mg oral tablet: 1 tab(s) orally once a day  Eliquis 5 mg oral tablet: 1 tab(s) orally 2 times a day  metoprolol succinate 50 mg oral capsule, extended release: 1 cap(s) orally once a day   amoxicillin-clavulanate 500 mg-125 mg oral tablet: 1 tab(s) orally every 12 hours  aspirin 81 mg oral tablet: 1 tab(s) orally once a day  atorvastatin 40 mg oral tablet: 1 tab(s) orally once a day (at bedtime)  chlorhexidine 0.12% mucous membrane liquid: 5 milliliter(s) mucous membrane every 12 hours Mouthwash BID  chlorhexidine 2% topical pad: Apply topically to affected area once a day 1 Apply topically to affected area once a day  donepezil 10 mg oral tablet: 1 tab(s) orally once a day  Eliquis 5 mg oral tablet: 1 tab(s) orally 2 times a day  metoprolol succinate 50 mg oral capsule, extended release: 1 cap(s) orally once a day

## 2025-04-28 NOTE — ADVANCED PRACTICE NURSE CONSULT - ASSESSMENT
General: A&Ox4, able to turn with 1-2x assistance, incontinent of urine and stool with external female urinary catching device (prima fit) in place. Upon assessment, patient was placed on bedpan for medium brown pasty stool. Skin warm, dry with increased moisture in intertriginous folds, scattered areas of hyperpigmentation and hypopigmentation, scattered areas of ecchymosis on bilateral upper extremities with no hematomas. Blanchable erythema on bilateral heels.     L cheek: Trauma wound with suspected fistula; as per patient, was playing outside with grandson and was hit in face with soccer ball accidentally. Wound is measuring 0.5cmx0.5x0.2cm; unable to verify depth/fistula due to close approximation of wound. Wound presenting with 95% serosanguinous scab with 5% red dermis in center of wound. Periwound intact. No induration, no erythema, no crepitus, no fluctuance, no edema, no temperature changes, no overt signs of infection noted.    Sacrum to BL buttocks: MAD/IAD as evidenced by scattered areas of blanchable erythema, increased moisture and h/o incontinence. No induration, no erythema, no crepitus, no fluctuance, no edema, no temperature changes, no overt signs of infection noted.

## 2025-04-28 NOTE — DISCHARGE NOTE PROVIDER - NSDCCPCAREPLAN_GEN_ALL_CORE_FT
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 The abscess was drained and treated with antibiotics. You should continue chlorhexidine rinses twice daily as directed. Do not wear your dentures until instructed by your oral surgeon. For now, continue a full liquid diet until otherwise instructed. If you experience increased pain, swelling, redness, or drainage from the area, or if you develop a fever, return to the emergency room promptly. Continue your home medications, including Eliquis, aspirin, metoprolol, and donepezil, unless otherwise instructed by your physician. Given your history of pulmonary embolism, it's crucial to take your Eliquis as prescribed. Report any signs of bleeding, such as blood in your urine or stool, nosebleeds, or excessive bruising, to your physician immediately. Follow up with your primary care physician for ongoing management of your medical conditions. Report any changes in your condition or new symptoms to your physician promptly. You will need to follow up with Oral-Maxillofacial Surgery (OMFS) for further evaluation and instructions regarding your dentures. You are scheduled to follow-up with Dr. Wes Kilgore on 5/9/2025 at 830am.     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 The abscess was drained and treated with antibiotics. You should continue chlorhexidine rinses twice daily as directed. Do not wear your dentures until instructed by your oral surgeon, you have an appt on 5/9. For now, continue a full liquid diet (meaning you can eat anything that is NON CHEW until 5/9). If you experience increased pain, swelling, redness, or drainage from the area, or if you develop a fever, return to the emergency room promptly. Continue your home medications, including Eliquis, aspirin, metoprolol, and donepezil, unless otherwise instructed by your physician. Given your history of pulmonary embolism, it's crucial to take your Eliquis as prescribed. Report any signs of bleeding, such as blood in your urine or stool, nosebleeds, or excessive bruising, to your physician immediately. Follow up with your primary care physician for ongoing management of your medical conditions. Report any changes in your condition or new symptoms to your physician promptly. You will need to follow up with Oral-Maxillofacial Surgery (OMFS) for further evaluation and instructions regarding your dentures. You are scheduled to follow-up with Dr. Wes Kilgore on 5/9/2025 at 830am.

## 2025-04-28 NOTE — PROGRESS NOTE ADULT - SUBJECTIVE AND OBJECTIVE BOX
Patient is a 85y old  Female who presents with a chief complaint of cheek abscess (2025 12:42)      ======Overnight/Subjective======  Overnight- NAEON     Subjective- pt seen and examined at bedside. no acute concerns.     Brief daily plan- pending wound care consult.    ======Medications======  MEDICATIONS  (STANDING):  ampicillin/sulbactam  IVPB 3 Gram(s) IV Intermittent every 12 hours  apixaban 5 milliGRAM(s) Oral every 12 hours  aspirin enteric coated 81 milliGRAM(s) Oral daily  atorvastatin 40 milliGRAM(s) Oral at bedtime  chlorhexidine 0.12% Liquid 15 milliLiter(s) Swish and Spit two times a day  chlorhexidine 2% Cloths 1 Application(s) Topical daily  donepezil 10 milliGRAM(s) Oral at bedtime  metoprolol succinate ER 50 milliGRAM(s) Oral daily    MEDICATIONS  (PRN):  acetaminophen     Tablet .. 650 milliGRAM(s) Oral every 6 hours PRN Temp greater or equal to 38C (100.4F), Mild Pain (1 - 3)  melatonin 3 milliGRAM(s) Oral at bedtime PRN Insomnia      ======Vital Signs======  T(C): 36.3 (25 @ 06:16), Max: 36.6 (25 @ 13:01)  T(F): 97.3 (25 @ 06:16), Max: 97.8 (25 @ 13:01)  HR: 60 (25 @ 06:16) (60 - 66)  BP: 140/50 (25 @ 06:16) (133/59 - 148/60)  BP(mean): --  RR: 16 (25 @ 06:16) (16 - 16)  SpO2: 100% (25 @ 06:16) (100% - 100%)    ======Physical exams======    HEENT: PERRL, no scleral icterus, +I&D site covered in bandage +marked erythema and blood clot along L buccal mucosa   CV: RRR, normal S1 and S2  Lungs: normal respiratory effort on RA, CTAB  Abd: soft, nontender, nondistended  Ext: no edema, 2+ peripheral pulses   Psych: AAOx2-3 (knows everything but year), calm  Neuro: grossly non-focal, moving all extremities spontaneously   Skin: no rashes or lesions    ======Labs======                11.3[L]  8.24 >----------< 241  (MCV: 90.6)                35.6   138 | 103 | 23  -----------------------< 91  3.8 | 22 | 1.45[H]      Ca: 9.4 / Phos: 3.5 / M.60 (25 @ 05:40)      ======Microbiology======  Urinalysis Basic - ( 2025 05:40 )    Color: x / Appearance: x / SG: x / pH: x  Gluc: 91 mg/dL / Ketone: x  / Bili: x / Urobili: x   Blood: x / Protein: x / Nitrite: x   Leuk Esterase: x / RBC: x / WBC x   Sq Epi: x / Non Sq Epi: x / Bacteria: x        Culture - Wound Aerobic/Anaerobic (collected 2025 00:27)  Source: Drainage Drainage  Preliminary Report (2025 13:16):    No growth to date    Culture - Wound Aerobic/Anaerobic (collected 2025 00:27)  Source: Drainage Drainage  Preliminary Report (2025 13:15):    No growth to date        ======I&O's======  I&O's Summary       Patient is a 85y old  Female who presents with a chief complaint of cheek abscess (2025 12:42)      ======Overnight/Subjective======  Overnight- NAEON     Subjective- pt seen and examined at bedside. no acute concerns.     Brief daily plan- pending wound care and PT consult.    ======Medications======  MEDICATIONS  (STANDING):  ampicillin/sulbactam  IVPB 3 Gram(s) IV Intermittent every 12 hours  apixaban 5 milliGRAM(s) Oral every 12 hours  aspirin enteric coated 81 milliGRAM(s) Oral daily  atorvastatin 40 milliGRAM(s) Oral at bedtime  chlorhexidine 0.12% Liquid 15 milliLiter(s) Swish and Spit two times a day  chlorhexidine 2% Cloths 1 Application(s) Topical daily  donepezil 10 milliGRAM(s) Oral at bedtime  metoprolol succinate ER 50 milliGRAM(s) Oral daily    MEDICATIONS  (PRN):  acetaminophen     Tablet .. 650 milliGRAM(s) Oral every 6 hours PRN Temp greater or equal to 38C (100.4F), Mild Pain (1 - 3)  melatonin 3 milliGRAM(s) Oral at bedtime PRN Insomnia      ======Vital Signs======  T(C): 36.3 (25 @ 06:16), Max: 36.6 (25 @ 13:01)  T(F): 97.3 (25 @ 06:16), Max: 97.8 (25 @ 13:01)  HR: 60 (25 @ 06:16) (60 - 66)  BP: 140/50 (25 @ 06:16) (133/59 - 148/60)  BP(mean): --  RR: 16 (25 @ 06:16) (16 - 16)  SpO2: 100% (25 @ 06:16) (100% - 100%)    ======Physical exams======    HEENT: PERRL, no scleral icterus, +I&D site covered in bandage +marked erythema and blood clot along L buccal mucosa   CV: RRR, normal S1 and S2  Lungs: normal respiratory effort on RA, CTAB  Abd: soft, nontender, nondistended  Ext: no edema, 2+ peripheral pulses   Psych: AAOx2-3 (knows everything but year), calm  Neuro: grossly non-focal, moving all extremities spontaneously   Skin: no rashes or lesions    ======Labs======                11.3[L]  8.24 >----------< 241  (MCV: 90.6)                35.6   138 | 103 | 23  -----------------------< 91  3.8 | 22 | 1.45[H]      Ca: 9.4 / Phos: 3.5 / M.60 (25 @ 05:40)      ======Microbiology======  Urinalysis Basic - ( 2025 05:40 )    Color: x / Appearance: x / SG: x / pH: x  Gluc: 91 mg/dL / Ketone: x  / Bili: x / Urobili: x   Blood: x / Protein: x / Nitrite: x   Leuk Esterase: x / RBC: x / WBC x   Sq Epi: x / Non Sq Epi: x / Bacteria: x        Culture - Wound Aerobic/Anaerobic (collected 2025 00:27)  Source: Drainage Drainage  Preliminary Report (2025 13:16):    No growth to date    Culture - Wound Aerobic/Anaerobic (collected 2025 00:27)  Source: Drainage Drainage  Preliminary Report (2025 13:15):    No growth to date        ======I&O's======  I&O's Summary

## 2025-04-28 NOTE — ADVANCED PRACTICE NURSE CONSULT - REASON FOR CONSULT
Patient seen on skin care rounds after wound care referral received for assessment of skin impairment and recommendations of topical management of the L cheek. As per H&P, patient is a 85F, from [facility], with history of dementia, CAD s/p stent 11/2019, HFpEF, severe AS, HTN, HLD, CKD3, and DVT/saddle PE in 10/2024, transferred from [place] ED for OMFS evaluation for left cheek abscess. Pt unable to provide history, was unable to reach son overnight, history per chart review Pt reportedly struck by soccer ball kicked by her great-grandson 4 days ago. With draining abscess. No fevers.

## 2025-04-28 NOTE — ADVANCED PRACTICE NURSE CONSULT - RECOMMEDATIONS
Topical Recommendations     L cheek: Cleanse with NS, pat dry. Apply Liquid barrier film to periwound skin (allow to dry). Apply silicone foam with borders. Change daily and PRN if soiled.    Sacrum to BL buttocks: Cleanse with skin cleanser, pat dry. Apply Janie moisture barrier cream twice a day and PRN with incontinent episodes.    While inpatient, Continue low air loss bed therapy, continue heel elevation, continue to turn & reposition per protocol, soft pillow between bony prominences, continue moisture management with barrier creams & single breathable pad, continue measures to decrease friction/shear/pressure. Continue with nutritional support as per dietary/orders.    Plan of care discussed with patient, all questions answered.    Please contact Wound Care Service Line if we can be of further assistance (ext [phone]).  Topical Recommendations     L cheek: Cleanse with NS, pat dry. Apply Liquid barrier film to periwound skin (allow to dry). Apply silicone foam with borders. Change 3x a week.     Sacrum to BL buttocks: Cleanse with skin cleanser, pat dry. Apply Janie moisture barrier cream twice a day and PRN with incontinent episodes.    While inpatient, Continue low air loss bed therapy, continue heel elevation, continue to turn & reposition per protocol, soft pillow between bony prominences, continue moisture management with barrier creams & single breathable pad, continue measures to decrease friction/shear/pressure. Continue with nutritional support as per dietary/orders.    Plan of care discussed with patient, all questions answered.    Please contact Wound Care Service Line if we can be of further assistance (ext [phone]).

## 2025-04-28 NOTE — PROGRESS NOTE ADULT - PROBLEM SELECTOR PLAN 1
s/p I&D and fistula irrigation with vancomycin in ED by OMFS  Wound culture ngtd     - continue Unasyn IV per OMFS recs   - chlorhexidine rinses BID   - discontinue denture, full liquid diet for now   - OMFS following  - wound care consult placed s/p I&D and fistula irrigation with vancomycin in ED by OMFS  Wound culture ngtd     - continue Unasyn IV per OMFS recs, Augmentin on discharge   - chlorhexidine rinses BID   - discontinue denture, full liquid diet for now   - OMFS following  - wound care consult placed- gave reccs (see provider to RN) s/p I&D and fistula irrigation with vancomycin in ED by OMFS  Wound culture ngtd     - continue Unasyn IV per OMFS recs, Augmentin on discharge   - chlorhexidine rinses BID   - discontinue denture, full liquid diet for now   - OMFS following  - wound care consult placed- gave recs (see provider to RN)

## 2025-04-29 LAB
ANION GAP SERPL CALC-SCNC: 14 MMOL/L — SIGNIFICANT CHANGE UP (ref 7–14)
BUN SERPL-MCNC: 19 MG/DL — SIGNIFICANT CHANGE UP (ref 7–23)
CALCIUM SERPL-MCNC: 9.2 MG/DL — SIGNIFICANT CHANGE UP (ref 8.4–10.5)
CHLORIDE SERPL-SCNC: 106 MMOL/L — SIGNIFICANT CHANGE UP (ref 98–107)
CO2 SERPL-SCNC: 19 MMOL/L — LOW (ref 22–31)
CREAT SERPL-MCNC: 1.52 MG/DL — HIGH (ref 0.5–1.3)
EGFR: 33 ML/MIN/1.73M2 — LOW
EGFR: 33 ML/MIN/1.73M2 — LOW
GLUCOSE SERPL-MCNC: 83 MG/DL — SIGNIFICANT CHANGE UP (ref 70–99)
HCT VFR BLD CALC: 36.4 % — SIGNIFICANT CHANGE UP (ref 34.5–45)
HGB BLD-MCNC: 11.4 G/DL — LOW (ref 11.5–15.5)
MAGNESIUM SERPL-MCNC: 2.6 MG/DL — SIGNIFICANT CHANGE UP (ref 1.6–2.6)
MCHC RBC-ENTMCNC: 28.9 PG — SIGNIFICANT CHANGE UP (ref 27–34)
MCHC RBC-ENTMCNC: 31.3 G/DL — LOW (ref 32–36)
MCV RBC AUTO: 92.2 FL — SIGNIFICANT CHANGE UP (ref 80–100)
NRBC # BLD AUTO: 0 K/UL — SIGNIFICANT CHANGE UP (ref 0–0)
NRBC # FLD: 0 K/UL — SIGNIFICANT CHANGE UP (ref 0–0)
NRBC BLD AUTO-RTO: 0 /100 WBCS — SIGNIFICANT CHANGE UP (ref 0–0)
PHOSPHATE SERPL-MCNC: 3.4 MG/DL — SIGNIFICANT CHANGE UP (ref 2.5–4.5)
PLATELET # BLD AUTO: 188 K/UL — SIGNIFICANT CHANGE UP (ref 150–400)
POTASSIUM SERPL-MCNC: 4.3 MMOL/L — SIGNIFICANT CHANGE UP (ref 3.5–5.3)
POTASSIUM SERPL-SCNC: 4.3 MMOL/L — SIGNIFICANT CHANGE UP (ref 3.5–5.3)
RBC # BLD: 3.95 M/UL — SIGNIFICANT CHANGE UP (ref 3.8–5.2)
RBC # FLD: 14.1 % — SIGNIFICANT CHANGE UP (ref 10.3–14.5)
SODIUM SERPL-SCNC: 139 MMOL/L — SIGNIFICANT CHANGE UP (ref 135–145)
WBC # BLD: 5.73 K/UL — SIGNIFICANT CHANGE UP (ref 3.8–10.5)
WBC # FLD AUTO: 5.73 K/UL — SIGNIFICANT CHANGE UP (ref 3.8–10.5)

## 2025-04-29 RX ADMIN — Medication 15 MILLILITER(S): at 18:29

## 2025-04-29 RX ADMIN — Medication 1 APPLICATION(S): at 11:59

## 2025-04-29 RX ADMIN — AMPICILLIN SODIUM AND SULBACTAM SODIUM 200 GRAM(S): 1; .5 INJECTION, POWDER, FOR SOLUTION INTRAMUSCULAR; INTRAVENOUS at 13:08

## 2025-04-29 RX ADMIN — Medication 81 MILLIGRAM(S): at 11:58

## 2025-04-29 RX ADMIN — APIXABAN 5 MILLIGRAM(S): 2.5 TABLET, FILM COATED ORAL at 18:29

## 2025-04-29 RX ADMIN — AMPICILLIN SODIUM AND SULBACTAM SODIUM 200 GRAM(S): 1; .5 INJECTION, POWDER, FOR SOLUTION INTRAMUSCULAR; INTRAVENOUS at 23:26

## 2025-04-29 RX ADMIN — DONEPEZIL HYDROCHLORIDE 10 MILLIGRAM(S): 5 TABLET ORAL at 22:07

## 2025-04-29 RX ADMIN — Medication 15 MILLILITER(S): at 05:27

## 2025-04-29 RX ADMIN — APIXABAN 5 MILLIGRAM(S): 2.5 TABLET, FILM COATED ORAL at 05:27

## 2025-04-29 RX ADMIN — ATORVASTATIN CALCIUM 40 MILLIGRAM(S): 80 TABLET, FILM COATED ORAL at 22:08

## 2025-04-29 NOTE — PHYSICAL THERAPY INITIAL EVALUATION ADULT - MANUAL MUSCLE TESTING RESULTS, REHAB EVAL
Bilateral UE muscle strength grossly at least 3+/5 Throughout; Bilateral LE muscle strength grossly at least 3+/5 throughout.

## 2025-04-29 NOTE — PROGRESS NOTE ADULT - PROBLEM SELECTOR PLAN 2
was hospitalized at [facility] in 10/2024 with saddle PE with RHS, was managed with heparin gtt and discharged on Eliquis    - c/w home Eliquis 5mg BID (discussed w/ OMFS, ok to resume)

## 2025-04-29 NOTE — PHYSICAL THERAPY INITIAL EVALUATION ADULT - ADDITIONAL COMMENTS
Pt. reports she lives in the [facility] in [place].  Prior to admission Pt ambulated with a rolling walker independently and needs some assistance from staff with ADLs such as bathing. Pt states she had one recent fall in the past 6 months when she was getting out of bed and ended up on the floor from slipping.    Pt. left comfortable laying semi supine in bed post PT Evaluation, no apparent distress, call bell in reach, all lines intact, +bed alarm. SAUL Plummer made aware of pt. status and participation in PT.

## 2025-04-29 NOTE — PROGRESS NOTE ADULT - SUBJECTIVE AND OBJECTIVE BOX
Patient is a 85y old  Female who presents with a chief complaint of cheek abscess (2025 15:16)      ======Overnight/Subjective======  Overnight- NAEON    Subjective- pt seen and examined at bedside. no acute concerns.     Brief daily plan- PT evaluation, dipo planning.    ======Medications======  MEDICATIONS  (STANDING):  ampicillin/sulbactam  IVPB 3 Gram(s) IV Intermittent every 12 hours  apixaban 5 milliGRAM(s) Oral every 12 hours  aspirin enteric coated 81 milliGRAM(s) Oral daily  atorvastatin 40 milliGRAM(s) Oral at bedtime  chlorhexidine 0.12% Liquid 15 milliLiter(s) Swish and Spit two times a day  chlorhexidine 2% Cloths 1 Application(s) Topical daily  donepezil 10 milliGRAM(s) Oral at bedtime  metoprolol succinate ER 50 milliGRAM(s) Oral daily    MEDICATIONS  (PRN):  acetaminophen     Tablet .. 650 milliGRAM(s) Oral every 6 hours PRN Temp greater or equal to 38C (100.4F), Mild Pain (1 - 3)  melatonin 3 milliGRAM(s) Oral at bedtime PRN Insomnia      ======Vital Signs======  T(C): 36.7 (25 @ 04:55), Max: 36.7 (25 @ 21:04)  T(F): 98 (25 @ 04:55), Max: 98.1 (25 @ 21:04)  HR: 93 (25 @ 04:55) (59 - 93)  BP: 117/56 (25 @ 04:55) (117/56 - 148/58)  BP(mean): --  RR: 17 (25 @ 04:55) (17 - 20)  SpO2: 100% (25 @ 04:55) (100% - 100%)    ======Physical exams======    HEENT: PERRL, no scleral icterus, +I&D site covered in bandage +marked erythema and blood clot along L buccal mucosa   CV: RRR, normal S1 and S2  Lungs: normal respiratory effort on RA, CTAB  Abd: soft, nontender, nondistended  Ext: no edema, 2+ peripheral pulses   Psych: AAOx2-3 (knows everything but year), calm  Neuro: grossly non-focal, moving all extremities spontaneously   Skin: no rashes or lesions    ======Labs======                11.3[L]  8.24 >----------< 241  (MCV: 90.6)                35.6   138 | 103 | 23  -----------------------< 91  3.8 | 22 | 1.45[H]      Ca: 9.4 / Phos: 3.5 / M.60 (25 @ 05:40)          ======Microbiology======  Urinalysis Basic - ( 2025 05:40 )    Color: x / Appearance: x / SG: x / pH: x  Gluc: 91 mg/dL / Ketone: x  / Bili: x / Urobili: x   Blood: x / Protein: x / Nitrite: x   Leuk Esterase: x / RBC: x / WBC x   Sq Epi: x / Non Sq Epi: x / Bacteria: x          ======I&O's======  I&O's Summary       Patient is a 85y old  Female who presents with a chief complaint of cheek abscess (2025 15:16)      ======Overnight/Subjective======  Overnight- NAEON    Subjective- pt seen and examined at bedside. no acute concerns.     Brief daily plan- PT evaluation, dispo planning.    ======Medications======  MEDICATIONS  (STANDING):  ampicillin/sulbactam  IVPB 3 Gram(s) IV Intermittent every 12 hours  apixaban 5 milliGRAM(s) Oral every 12 hours  aspirin enteric coated 81 milliGRAM(s) Oral daily  atorvastatin 40 milliGRAM(s) Oral at bedtime  chlorhexidine 0.12% Liquid 15 milliLiter(s) Swish and Spit two times a day  chlorhexidine 2% Cloths 1 Application(s) Topical daily  donepezil 10 milliGRAM(s) Oral at bedtime  metoprolol succinate ER 50 milliGRAM(s) Oral daily    MEDICATIONS  (PRN):  acetaminophen     Tablet .. 650 milliGRAM(s) Oral every 6 hours PRN Temp greater or equal to 38C (100.4F), Mild Pain (1 - 3)  melatonin 3 milliGRAM(s) Oral at bedtime PRN Insomnia      ======Vital Signs======  T(C): 36.7 (25 @ 04:55), Max: 36.7 (25 @ 21:04)  T(F): 98 (25 @ 04:55), Max: 98.1 (25 @ 21:04)  HR: 93 (25 @ 04:55) (59 - 93)  BP: 117/56 (25 @ 04:55) (117/56 - 148/58)  BP(mean): --  RR: 17 (25 @ 04:55) (17 - 20)  SpO2: 100% (25 @ 04:55) (100% - 100%)    ======Physical exams======    HEENT: PERRL, no scleral icterus, +I&D site covered in bandage +marked erythema and blood clot along L buccal mucosa   CV: RRR, normal S1 and S2  Lungs: normal respiratory effort on RA, CTAB  Abd: soft, nontender, nondistended  Ext: no edema, 2+ peripheral pulses   Psych: AAOx2-3 (knows everything but year), calm  Neuro: grossly non-focal, moving all extremities spontaneously   Skin: no rashes or lesions    ======Labs======                11.3[L]  8.24 >----------< 241  (MCV: 90.6)                35.6   138 | 103 | 23  -----------------------< 91  3.8 | 22 | 1.45[H]      Ca: 9.4 / Phos: 3.5 / M.60 (25 @ 05:40)          ======Microbiology======  Urinalysis Basic - ( 2025 05:40 )    Color: x / Appearance: x / SG: x / pH: x  Gluc: 91 mg/dL / Ketone: x  / Bili: x / Urobili: x   Blood: x / Protein: x / Nitrite: x   Leuk Esterase: x / RBC: x / WBC x   Sq Epi: x / Non Sq Epi: x / Bacteria: x          ======I&O's======  I&O's Summary

## 2025-04-29 NOTE — PROGRESS NOTE ADULT - PROBLEM SELECTOR PLAN 1
s/p I&D and fistula irrigation with vancomycin in ED by OMFS  Wound culture ngtd     - continue Unasyn IV per OMFS recs, Augmentin on discharge   - chlorhexidine rinses BID   - discontinue denture, full liquid diet for now   - OMFS following  - wound care consult placed- gave recs (see provider to RN) s/p I&D and fistula irrigation with vancomycin in ED by OMFS  Wound culture growing few streptococcal anginosus and rare staph epidermidis     - continue Unasyn IV per OMFS recs, Augmentin on discharge   - chlorhexidine rinses BID   - discontinue denture, full liquid diet for now   - OMFS following  - wound care consult placed- gave recs (see provider to RN) s/p I&D and fistula irrigation with vancomycin in ED by OMFS  Wound culture growing few streptococcal anginosus and rare staph epidermidis     - continue Unasyn IV per OMFS recs, Augmentin on discharge   - chlorhexidine rinses BID   - discontinue denture, full liquid diet for now (can eat anything non chew, but full liquid diet is closest while inpatient)  - OMFS following, has outpt appt 5/9/25   - wound care consult placed- gave recs (see provider to RN)

## 2025-04-29 NOTE — PROGRESS NOTE ADULT - PROBLEM SELECTOR PLAN 7
Diet: Full liquid   DVT ppx: Eliquis   Dispo: pending PT be, plan for d/c today Diet: Full liquid   DVT ppx: Eliquis   Dispo: No skilled PT needs, discharge to [facility] tomorrow 4/30    Note incomplete without attending attestation. Diet: Full liquid   DVT ppx: Eliquis   Dispo: No skilled PT needs, discharge to [facility] tomorrow 4/30

## 2025-04-29 NOTE — PROGRESS NOTE ADULT - TIME-BASED BILLING (NON-CRITICAL CARE)
Time-based billing (NON-critical care)
Friend

## 2025-04-29 NOTE — PHYSICAL THERAPY INITIAL EVALUATION ADULT - GENERAL OBSERVATIONS, REHAB EVAL
Consult received, chart reviewed. Patient received in bed semi-supine, no apparent distress,  HR 93. Pt. agreeable to participate in PT.

## 2025-04-30 ENCOUNTER — TRANSCRIPTION ENCOUNTER (OUTPATIENT)
Age: 85
End: 2025-04-30

## 2025-04-30 VITALS
DIASTOLIC BLOOD PRESSURE: 57 MMHG | TEMPERATURE: 98 F | HEART RATE: 66 BPM | SYSTOLIC BLOOD PRESSURE: 140 MMHG | RESPIRATION RATE: 17 BRPM | OXYGEN SATURATION: 99 %

## 2025-04-30 RX ORDER — AMOXICILLIN AND CLAVULANATE POTASSIUM 500; 125 MG/1; MG/1
1 TABLET, FILM COATED ORAL
Qty: 14 | Refills: 0
Start: 2025-04-30 | End: 2025-05-06

## 2025-04-30 RX ORDER — AMOXICILLIN AND CLAVULANATE POTASSIUM 500; 125 MG/1; MG/1
1 TABLET, FILM COATED ORAL EVERY 12 HOURS
Refills: 0 | Status: DISCONTINUED | OUTPATIENT
Start: 2025-04-30 | End: 2025-04-30

## 2025-04-30 RX ADMIN — AMOXICILLIN AND CLAVULANATE POTASSIUM 1 TABLET(S): 500; 125 TABLET, FILM COATED ORAL at 17:26

## 2025-04-30 RX ADMIN — Medication 15 MILLILITER(S): at 17:26

## 2025-04-30 RX ADMIN — APIXABAN 5 MILLIGRAM(S): 2.5 TABLET, FILM COATED ORAL at 17:27

## 2025-04-30 RX ADMIN — Medication 81 MILLIGRAM(S): at 11:53

## 2025-04-30 RX ADMIN — Medication 15 MILLILITER(S): at 05:49

## 2025-04-30 RX ADMIN — Medication 1 APPLICATION(S): at 17:26

## 2025-04-30 RX ADMIN — APIXABAN 5 MILLIGRAM(S): 2.5 TABLET, FILM COATED ORAL at 05:49

## 2025-04-30 NOTE — DISCHARGE NOTE NURSING/CASE MANAGEMENT/SOCIAL WORK - NSDCFUADDAPPT_GEN_ALL_CORE_FT
APPTS ARE READY TO BE MADE: [x] YES    Best Family or Patient Contact (if needed):    Additional Information about above appointments (if needed):    1: OMFS Dr. Kilgore   2: PCP with Dr. Chand  3:     Other comments or requests:

## 2025-04-30 NOTE — PROGRESS NOTE ADULT - ASSESSMENT
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   - Wound care consult  - Chlorhexidine rinse BID      Dalton Hagan  Oral and Maxillofacial Surgery  [facility] Pager #[phone]  [facility] Pager: [phone]  Available on Teams
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   - Wound care consult  - Chlorhexidine rinse BID    Ovi Montes  Oral and Maxillofacial Surgery  [facility] Pager #[phone]  [facility] Pager: [phone]  Available on Teams
85F, from [facility], with history of dementia, CAD s/p stent 11/2019, HFpEF, severe AS, HTN, HLD, CKD3, and DVT/saddle PE in 10/2024 transferred here for OMFS evaluation of left cheek abscess, found to have epilus fissuratum secondary to poor denture hygiene with fistula formation to cheek. 
85F, from [facility], with history of dementia, CAD s/p stent 11/2019, HFpEF, severe AS, HTN, HLD, CKD3, and DVT/saddle PE in 10/2024 transferred here for OMFS evaluation of left cheek abscess, found to have epilus fissuratum secondary to poor denture hygiene with fistula formation to cheek. 
85F, from [facility], with history of dementia, CAD s/p stent 11/2019, HFpEF, severe AS, HTN, HLD, CKD3, and DVT/saddle PE in 10/2024 transferred here for OMFS evaluation of left cheek abscess, found to have epilus fissuratum secondary to poor denture hygiene with fistula formation to cheek. Discharge to [facility] today.
85F, from [facility], with history of dementia, CAD s/p stent 11/2019, HFpEF, severe AS, HTN, HLD, CKD3, and DVT/saddle PE in 10/2024 transferred here for OMFS evaluation of left cheek abscess, found to have epilus fissuratum secondary to poor denture hygiene with fistula formation to cheek. 
85F, from [facility], with history of dementia, CAD s/p stent 11/2019, HFpEF, severe AS, HTN, HLD, CKD3, and DVT/saddle PE in 10/2024 transferred here for OMFS evaluation of left cheek abscess, found to have epilus fissuratum secondary to poor denture hygiene with fistula formation to cheek.

## 2025-04-30 NOTE — PROGRESS NOTE ADULT - PROBLEM SELECTOR PLAN 7
Diet: Full liquid   DVT ppx: Eliquis   Dispo: No skilled PT needs, discharge to [facility] today    Note incomplete without attending attestation.

## 2025-04-30 NOTE — DISCHARGE NOTE NURSING/CASE MANAGEMENT/SOCIAL WORK - NSSCTYPOFSERV_GEN_ALL_CORE
Nurse/Wound care.  Nurse/Wound care. Nurse to visit on . Nurse to call to arrange visit. Nurse/Wound care. Nurse to visit on 05/01/2025. Nurse to call to arrange visit.

## 2025-04-30 NOTE — DISCHARGE NOTE NURSING/CASE MANAGEMENT/SOCIAL WORK - PATIENT PORTAL LINK FT
You can access the FollowMyHealth Patient Portal offered by [facility] by registering at the following website: [email or web address]. By joining [facility]’s FollowMyHealth portal, you will also be able to view your health information using other applications (apps) compatible with our system.

## 2025-04-30 NOTE — PROGRESS NOTE ADULT - PROBLEM SELECTOR PLAN 2
was hospitalized at [facility] in 10/2024 with saddle PE with RHS, was managed with heparin gtt and discharged on Eliquis    - c/w home Eliquis 5mg BID (discussed w/ OMFS, ok to resume)

## 2025-04-30 NOTE — PROGRESS NOTE ADULT - ATTENDING COMMENTS
85F, from [facility], with history of dementia, CAD s/p stent 11/2019, HFpEF, severe AS, HTN, HLD, CKD3, and DVT/saddle PE in 10/2024 transferred here for OMFS evaluation of left cheek abscess, found to have epilus fissuratum secondary to poor denture hygiene with fistula formation to cheek.     OMFS following, s/p I&D 4/26 (cx NGT), on IV Unasyn 3g IVPB q12h, transitioned to PO Augmentin x 7d.     Clinically stable otherwise. Wound care recs noted. PT recs noted. Stable for discharge today, updated daughter over phone.
85F, from [facility], with history of dementia, CAD s/p stent 11/2019, HFpEF, severe AS, HTN, HLD, CKD3, and DVT/saddle PE in 10/2024 transferred here for OMFS evaluation of left cheek abscess, found to have epilus fissuratum secondary to poor denture hygiene with fistula formation to cheek.     OMFS following, s/p I&D 4/26 (cx NGT), on IV Unasyn 3g IVPB q12h (4/26 - )    Clinically stable otherwise. Wound care recs pending.     4/26: updated daughter at bedside, all qs answered.  4/27: updated daughter at bedside, son over phone. All qs answered.
85F, from [facility], with history of dementia, CAD s/p stent 11/2019, HFpEF, severe AS, HTN, HLD, CKD3, and DVT/saddle PE in 10/2024 transferred here for OMFS evaluation of left cheek abscess, found to have epilus fissuratum secondary to poor denture hygiene with fistula formation to cheek.     OMFS following, s/p I&D 4/26 (cx NGT), on IV Unasyn 3g IVPB q12h, can transition to PO Agumentin x 7d.     Clinically stable otherwise. Wound care recs noted. PT recs noted.     4/28: updated daughter over phone, all qs answered.
85F, from [facility], with history of dementia, CAD s/p stent 11/2019, HFpEF, severe AS, HTN, HLD, CKD3, and DVT/saddle PE in 10/2024 transferred here for OMFS evaluation of left cheek abscess, found to have epilus fissuratum secondary to poor denture hygiene with fistula formation to cheek.     OMFS following, s/p I&D 4/26 (cx NGT), on IV Unasyn 3g IVPB q12h (4/26 - )    Clinically stable otherwise. Wound care recs noted. PT eval pending. Intent for dc tomorrow on PO Augmentin if ok w/ OMFS.     4/28: updated daughter over phone, all qs answered.
85F, from [facility], with history of dementia, CAD s/p stent 11/2019, HFpEF, severe AS, HTN, HLD, CKD3, and DVT/saddle PE in 10/2024 transferred here for OMFS evaluation of left cheek abscess, found to have epilus fissuratum secondary to poor denture hygiene with fistula formation to cheek.     OMFS following, s/p I&D 4/26 (cultures pending), on IV Unasyn 3g IVPB q12h (4/26 - )    Clinically stable otherwise.     4/26: updated daughter at bedside, all qs answered.

## 2025-04-30 NOTE — DISCHARGE NOTE NURSING/CASE MANAGEMENT/SOCIAL WORK - FINANCIAL ASSISTANCE
[facility] provides services at a reduced cost to those who are determined to be eligible through [facility]’s financial assistance program. Information regarding [facility]’s financial assistance program can be found by going to [email or web address] or by calling [phone].

## 2025-04-30 NOTE — PROGRESS NOTE ADULT - SUBJECTIVE AND OBJECTIVE BOX
Patient is a 85y old  Female who presents with a chief complaint of cheek abscess (2025 07:52)      ======Overnight/Subjective======  Overnight- NAEON    Subjective- pt seen and examined at bedside. feeling good. no complaints. waiting to go home.     Brief daily plan- discharge to [facility].    ======Medications======  MEDICATIONS  (STANDING):  amoxicillin  500 milliGRAM(s)/clavulanate 1 Tablet(s) Oral every 12 hours  apixaban 5 milliGRAM(s) Oral every 12 hours  aspirin enteric coated 81 milliGRAM(s) Oral daily  atorvastatin 40 milliGRAM(s) Oral at bedtime  chlorhexidine 0.12% Liquid 15 milliLiter(s) Swish and Spit two times a day  chlorhexidine 2% Cloths 1 Application(s) Topical daily  donepezil 10 milliGRAM(s) Oral at bedtime  metoprolol succinate ER 50 milliGRAM(s) Oral daily    MEDICATIONS  (PRN):  acetaminophen     Tablet .. 650 milliGRAM(s) Oral every 6 hours PRN Temp greater or equal to 38C (100.4F), Mild Pain (1 - 3)  melatonin 3 milliGRAM(s) Oral at bedtime PRN Insomnia      ======Vital Signs======  T(C): 36.4 (25 @ 12:30), Max: 36.9 (25 @ 05:22)  T(F): 97.6 (25 @ 12:30), Max: 98.4 (25 @ 05:22)  HR: 66 (25 @ 12:30) (63 - 69)  BP: 140/57 (25 @ 12:30) (123/58 - 140/57)  BP(mean): --  RR: 17 (25 @ 12:30) (17 - 18)  SpO2: 99% (25 @ 12:30) (94% - 100%)    ======Physical exams======    HEENT: PERRL, no scleral icterus, +I&D site covered in bandage, swelling much improved and reduction in erythema. blood clot along L buccal mucosa resolving.  CV: RRR, normal S1 and S2  Lungs: normal respiratory effort on RA, CTAB  Abd: soft, nontender, nondistended  Ext: no edema, 2+ peripheral pulses   Psych: AAOx2-3 (knows everything but year), calm  Neuro: grossly non-focal, moving all extremities spontaneously   Skin: no rashes or lesions    ======Labs======                11.4[L]  5.73 >----------< 188  (MCV: 92.2)                36.4   139 | 106 | 19  -----------------------< 83  4.3 | 19[L] | 1.52[H]      Ca: 9.2 / Phos: 3.4 / M.60 (25 @ 06:30)          ======Microbiology======  Urinalysis Basic - ( 2025 06:30 )    Color: x / Appearance: x / SG: x / pH: x  Gluc: 83 mg/dL / Ketone: x  / Bili: x / Urobili: x   Blood: x / Protein: x / Nitrite: x   Leuk Esterase: x / RBC: x / WBC x   Sq Epi: x / Non Sq Epi: x / Bacteria: x          ======I&O's======  I&O's Summary

## 2025-04-30 NOTE — PROGRESS NOTE ADULT - PROBLEM SELECTOR PLAN 4
Cr seems to be at baseline according to HIE   Slowly increasing however still within baseline ~1.3-1.6 since 2022    - CTM, trend BMP
Cr seems to be at baseline according to HIE     - CTM, trend BMP
Cr seems to be at baseline according to HIE     - CTM, trend BMP

## 2025-04-30 NOTE — PROGRESS NOTE ADULT - PROBLEM SELECTOR PLAN 6
Hb seems to be at baseline according to HIE     - CTM CBC

## 2025-05-01 LAB
CULTURE RESULTS: ABNORMAL
CULTURE RESULTS: ABNORMAL
SPECIMEN SOURCE: SIGNIFICANT CHANGE UP
SPECIMEN SOURCE: SIGNIFICANT CHANGE UP

## 2025-05-09 ENCOUNTER — APPOINTMENT (OUTPATIENT)
Age: 85
End: 2025-05-09

## 2025-07-14 NOTE — ED ADULT TRIAGE NOTE - PATIENT'S PREFERRED PRONOUN
Vascular Surgery Note    Ms. Santamaria continues to endorse intermittent abdominal pain without peritonitis on my examination.  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 Pena and myself.  Risks and benefits of the aforementioned operation were discussed with Ms. Santamaria.  All of her questions were answered.     Her/She

## 2025-07-21 NOTE — ED PROVIDER NOTE - CADM POA URETHRAL CATHETER
Pharmacy faxed in a request for prior authorization on:    Medication: Duplilumab (Dupixent)  Dosage: 300 mg/2 ml  Quantity requested:  12 ml  Pharmacy for prescription has been selected.  Add Key or PA number if applicable [id number]  Initiation of prior authorization needed.  Prior authorization request has been initiated and sent for completion.   
No